# Patient Record
Sex: FEMALE | Race: WHITE | NOT HISPANIC OR LATINO
[De-identification: names, ages, dates, MRNs, and addresses within clinical notes are randomized per-mention and may not be internally consistent; named-entity substitution may affect disease eponyms.]

---

## 2022-06-22 ENCOUNTER — APPOINTMENT (OUTPATIENT)
Dept: ELECTROPHYSIOLOGY | Facility: CLINIC | Age: 72
End: 2022-06-22
Payer: MEDICARE

## 2022-06-22 VITALS
HEART RATE: 62 BPM | BODY MASS INDEX: 37.39 KG/M2 | DIASTOLIC BLOOD PRESSURE: 78 MMHG | HEIGHT: 64 IN | WEIGHT: 219 LBS | SYSTOLIC BLOOD PRESSURE: 162 MMHG

## 2022-06-22 DIAGNOSIS — Z45.010 ENCOUNTER FOR CHECKING AND TESTING OF CARDIAC PACEMAKER PULSE GENERATOR [BATTERY]: ICD-10-CM

## 2022-06-22 PROCEDURE — 93000 ELECTROCARDIOGRAM COMPLETE: CPT

## 2022-06-22 PROCEDURE — 99205 OFFICE O/P NEW HI 60 MIN: CPT

## 2022-06-22 NOTE — HISTORY OF PRESENT ILLNESS
[FreeTextEntry1] : 71 year old woman with history of atrial fibrillation, atrial flutter s/p ablation, bradycardia and syncope s/p ppm implant, HTN, DM, cardiomyopathy with moderate LV dysfunction, hyperparathyroidism, depression, pulmonary hypertension, presenting for pacemaker management.   \par \par She did have syncope many years ago and a Holter monitor revealed a 4.4 second pause as well as significant bradycardia.  She also has a long history of AF and atrial flutter and in 2011 was noted to have atrial flutter, in addition to atrial fibrillation.  On 3/8/2012 she underwent ablation of atrial flutter with Dr Ceballos, as well as a dual chamber ppm implant. \par \par She had previously been on sotalol after ppm implant, but has recently been on rate control with Coreg and digoxin, and anticoagulation with Xarelto. She has had persistent AF at some pint, but recently has been atrial paced predominantly.  \par \par Recently her pacemaker has been noted to be approaching BRANNON; Her dual chamber MDT Adapta ppm was implanted 3/8/12, and has been programmed in MVP-R mode. She has had 99% atrial pacing, and 2% ventricular pacing. Lead function has been stable (RV threshold 0.875V@0.4ms, RA 0.75V@0.4ms). No arrhythmia events are recorded. Estimated battery life to BRANNON is <1 month (range <1-7 months).  \par \par She does reports exertional dyspnea over the last several months, as well as progressive LE edema. She notes she is very limited by depression and anxiety. TTE in 2/2022 revealed LVEF 40-45% with pulm HTN (RVSP 76mmHg).  \par \par She does also report that she had a difficult hospitalization after her first ppm implant due to an allergic reaction to Vancomycin. She reports also an allergy to PCN and erythromycin, but has tolerated Clindamycin in the past. She has had palpitation occasionally as well.  \par \par ECG today initially revealed suspected accelerated junctional rhythm. After device interrogation she was maintained in an atrial paced rhythm, an repeat ECG revealed atrial pacing with narrow QRS and prolonged AV delay.  \par \par Current meds include Xarelto 20, Coreg 12.5mg bid, digoxin 250mcg qd, lisinopril, synthroid  \par \par

## 2022-06-22 NOTE — CARDIOLOGY SUMMARY
[de-identified] : 6/22/22 atrial pacing 62 bpm, low voltage p-wave, prolonged AV delay ( ms), RBBB, LAFB [de-identified] : TTE 2/9/22 LVEF 40-45%, LA 5.3cm, mild con LVH, mod inferior and posterior hypokinesis, mod MR, trace AI, mild PI, mod TR, RVSP 76

## 2022-06-22 NOTE — DISCUSSION/SUMMARY
[FreeTextEntry1] : 71 year old woman with history of atrial fibrillation, atrial flutter s/p ablation, bradycardia and syncope s/p ppm implant, HTN, DM, cardiomyopathy with moderate LV dysfunction, hyperparathyroidism, depression, pulmonary hypertension, presenting for pacemaker management.  Her pacemaker was implanted in the setting of syncope and sinus pause, and she has had tachy-elpidio syndrome. Her PPM is now approaching BRANNON, and she will require pacemaker generator replacement. The risks and benefits of this procedure, including procedure related risks such as bleeding and infection were reviewed. \par \par She does also have significant conduction disease with RBBB and LAFB on ECG and a prolonged AV delay with atrial pacing. She has not had significant ventricular pacing requirement, but has had progressive CHF symptoms, which may potentially be improved with CRT pacing to improve AV and VV synchrony. This could be considered at the time of generator placement- and will offer this as an alternative to generator replacement alone for possible symptomatic benefit. \par \par  She has had allergies to antibiotics in the past, but has tolerated Clindamycin previously. She does also have pulmonary htn, and will plan procedure under sedation. She expressed understanding and will plan elective ppm generator replacement.  \par \par Can reevaluate AF burden and associated symptoms following generator replacement, and reconsider rhythm control options in the future if needed.   \par \par -pacemaker generator replacement, and possible CRT pacemaker upgrade. Hold Xarelto 2 days prior.  \par \par

## 2022-06-22 NOTE — REVIEW OF SYSTEMS
[Feeling Fatigued] : feeling fatigued [SOB] : no shortness of breath [Dyspnea on exertion] : dyspnea during exertion [Chest Discomfort] : no chest discomfort [Lower Ext Edema] : lower extremity edema [Leg Claudication] : no intermittent leg claudication [Palpitations] : palpitations [Syncope] : no syncope [Dizziness] : no dizziness [Convulsions] : no convulsions [Confusion] : no confusion was observed [Depression] : depression [Anxiety] : anxiety [Negative] : Heme/Lymph

## 2022-06-22 NOTE — PHYSICAL EXAM
[Well Developed] : well developed [No Acute Distress] : no acute distress [Obese] : obese [Normal Conjunctiva] : normal conjunctiva [Normal S1, S2] : normal S1, S2 [Clear Lung Fields] : clear lung fields [Good Air Entry] : good air entry [No Respiratory Distress] : no respiratory distress  [Soft] : abdomen soft [Normal Gait] : normal gait [Moves all extremities] : moves all extremities [No Focal Deficits] : no focal deficits [Normal Speech] : normal speech [Alert and Oriented] : alert and oriented [de-identified] : 2/6 DAVIDE [de-identified] : 1+ pitting edema bilaterally. +varicosities

## 2022-09-22 ENCOUNTER — NON-APPOINTMENT (OUTPATIENT)
Age: 72
End: 2022-09-22

## 2022-10-11 ENCOUNTER — OUTPATIENT (OUTPATIENT)
Dept: OUTPATIENT SERVICES | Facility: HOSPITAL | Age: 72
LOS: 1 days | End: 2022-10-11
Payer: MEDICARE

## 2022-10-11 ENCOUNTER — TRANSCRIPTION ENCOUNTER (OUTPATIENT)
Age: 72
End: 2022-10-11

## 2022-10-11 DIAGNOSIS — Z95.0 PRESENCE OF CARDIAC PACEMAKER: Chronic | ICD-10-CM

## 2022-10-12 ENCOUNTER — TRANSCRIPTION ENCOUNTER (OUTPATIENT)
Age: 72
End: 2022-10-12

## 2022-10-12 PROCEDURE — 85027 COMPLETE CBC AUTOMATED: CPT

## 2022-10-12 PROCEDURE — 93005 ELECTROCARDIOGRAM TRACING: CPT

## 2022-10-12 PROCEDURE — 36415 COLL VENOUS BLD VENIPUNCTURE: CPT

## 2022-10-12 PROCEDURE — 33228 REMV&REPLC PM GEN DUAL LEAD: CPT

## 2022-10-12 PROCEDURE — 83036 HEMOGLOBIN GLYCOSYLATED A1C: CPT

## 2022-10-12 PROCEDURE — 80048 BASIC METABOLIC PNL TOTAL CA: CPT

## 2022-10-12 PROCEDURE — 86900 BLOOD TYPING SEROLOGIC ABO: CPT

## 2022-10-12 PROCEDURE — 83735 ASSAY OF MAGNESIUM: CPT

## 2022-10-12 PROCEDURE — 85610 PROTHROMBIN TIME: CPT

## 2022-10-12 PROCEDURE — 86850 RBC ANTIBODY SCREEN: CPT

## 2022-10-12 PROCEDURE — C1889: CPT

## 2022-10-12 PROCEDURE — 86901 BLOOD TYPING SEROLOGIC RH(D): CPT

## 2022-10-12 PROCEDURE — C1785: CPT

## 2022-10-12 PROCEDURE — 82962 GLUCOSE BLOOD TEST: CPT

## 2022-10-12 PROCEDURE — 85730 THROMBOPLASTIN TIME PARTIAL: CPT

## 2022-10-17 DIAGNOSIS — Z45.010 ENCOUNTER FOR CHECKING AND TESTING OF CARDIAC PACEMAKER PULSE GENERATOR [BATTERY]: ICD-10-CM

## 2023-12-19 PROBLEM — R00.1 BRADYCARDIA, UNSPECIFIED: Chronic | Status: ACTIVE | Noted: 2022-10-11

## 2023-12-19 PROBLEM — Z86.79 PERSONAL HISTORY OF OTHER DISEASES OF THE CIRCULATORY SYSTEM: Chronic | Status: ACTIVE | Noted: 2022-10-11

## 2023-12-19 PROBLEM — I48.92 UNSPECIFIED ATRIAL FLUTTER: Chronic | Status: ACTIVE | Noted: 2022-10-11

## 2023-12-19 PROBLEM — Z86.39 PERSONAL HISTORY OF OTHER ENDOCRINE, NUTRITIONAL AND METABOLIC DISEASE: Chronic | Status: ACTIVE | Noted: 2022-10-11

## 2024-01-02 ENCOUNTER — APPOINTMENT (OUTPATIENT)
Dept: FAMILY MEDICINE | Facility: CLINIC | Age: 74
End: 2024-01-02
Payer: MEDICARE

## 2024-01-02 VITALS
HEART RATE: 73 BPM | SYSTOLIC BLOOD PRESSURE: 150 MMHG | BODY MASS INDEX: 36.49 KG/M2 | OXYGEN SATURATION: 96 % | WEIGHT: 219 LBS | DIASTOLIC BLOOD PRESSURE: 80 MMHG | TEMPERATURE: 99 F | HEIGHT: 65 IN

## 2024-01-02 DIAGNOSIS — I27.0 PRIMARY PULMONARY HYPERTENSION: ICD-10-CM

## 2024-01-02 DIAGNOSIS — I45.10 UNSPECIFIED RIGHT BUNDLE-BRANCH BLOCK: ICD-10-CM

## 2024-01-02 DIAGNOSIS — Z00.00 ENCOUNTER FOR GENERAL ADULT MEDICAL EXAMINATION W/OUT ABNORMAL FINDINGS: ICD-10-CM

## 2024-01-02 DIAGNOSIS — I42.9 CARDIOMYOPATHY, UNSPECIFIED: ICD-10-CM

## 2024-01-02 PROCEDURE — G0438: CPT

## 2024-01-02 RX ORDER — ALLOPURINOL 100 MG/1
100 TABLET ORAL DAILY
Qty: 30 | Refills: 1 | Status: DISCONTINUED | COMMUNITY
Start: 2022-06-17 | End: 2024-01-02

## 2024-01-02 RX ORDER — LISINOPRIL 40 MG/1
40 TABLET ORAL DAILY
Qty: 90 | Refills: 2 | Status: COMPLETED | COMMUNITY
Start: 2022-06-17 | End: 2024-01-02

## 2024-01-04 NOTE — HISTORY OF PRESENT ILLNESS
[FreeTextEntry1] : CPE  [de-identified] : 72 y/o F with PMHx of atrial fibrillation, atrial flutter s/p ablation, bradycardia and syncope s/p ppm implant, HTN, DM, cardiomyopathy with moderate LV dysfunction, hyperparathyroidism, depression, pulmonary HTN here to establish care. Reports water retention, fatigue, losing balance, feel bloated, running nose, losing hair, urine incontinence. Reports blood per rectum occasionally, with hx of constipation. never had colonoscopy. Not seen by GI in the past.  Reports b/l LE edema with SOB with exertion. Takes HCTZ at home. Follow cardiologist, last seen 3 months ago. Cardio Dr Yvan lugo. Also reports fall in sept, was on the floor for 24 hrs, went to [facility].  No acute trauma or fractures. Follow Endo Dr ventura Borrero, last seen by her 1 month ago. sasha sensor need to be placed. Blood sugar at home  before meals  [employer] PMM interrogated- renovated -   2022 by Angelito Ceballos, Electrophysiologist  HCM: not vaccinated, declined. Colonoscopy never, pap smears years ago, last Mammo 5 years ago  Partner passed away this year in July, 2023. Follow psych for depression. On Xanax PRN

## 2024-01-04 NOTE — HEALTH RISK ASSESSMENT
[Fair] :  ~his/her~ mood as fair [Never (0 pts)] : Never (0 points) [One fall no injury in past year] : Patient reported one fall in the past year without injury [1] : 2) Feeling down, depressed, or hopeless for several days (1) [PHQ-2 Positive] : PHQ-2 Positive [Patient reported mammogram was normal] : Patient reported mammogram was normal [Patient reported PAP Smear was normal] : Patient reported PAP Smear was normal [None] : None [Alone] : lives alone [Single] : single [Feels Safe at Home] : Feels safe at home [Fully functional (bathing, dressing, toileting, transferring, walking, feeding)] : Fully functional (bathing, dressing, toileting, transferring, walking, feeding) [Fully functional (using the telephone, shopping, preparing meals, housekeeping, doing laundry, using] : Fully functional and needs no help or supervision to perform IADLs (using the telephone, shopping, preparing meals, housekeeping, doing laundry, using transportation, managing medications and managing finances) [Reports changes in dental health] : Reports changes in dental health [Seat Belt] :  uses seat belt [Never] : Never [1 or 2 (0 pts)] : 1 or 2 (0 points) [No] : In the past 12 months have you used drugs other than those required for medical reasons? No [Audit-CScore] : 0 [[id number]] : 2 [Change in mental status noted] : No change in mental status noted [Language] : denies difficulty with language [Behavior] : denies difficulty with behavior [Learning/Retaining New Information] : denies difficulty learning/retaining new information [Sexually Active] : not sexually active [High Risk Behavior] : no high risk behavior [Reports changes in hearing] : Reports no changes in hearing [Reports changes in vision] : Reports no changes in vision [MammogramDate] : 5 years ago  [PapSmearDate] : years ago  [ColonoscopyDate] : never  [Patient/Caregiver not ready to engage] : , patient/caregiver not ready to engage

## 2024-01-04 NOTE — PHYSICAL EXAM
[No Acute Distress] : no acute distress [Well Nourished] : well nourished [Well Developed] : well developed [Well-Appearing] : well-appearing [Normal Sclera/Conjunctiva] : normal sclera/conjunctiva [PERRL] : pupils equal round and reactive to light [EOMI] : extraocular movements intact [Normal Outer Ear/Nose] : the outer ears and nose were normal in appearance [Normal Oropharynx] : the oropharynx was normal [No JVD] : no jugular venous distention [Supple] : supple [No Lymphadenopathy] : no lymphadenopathy [Thyroid Normal, No Nodules] : the thyroid was normal and there were no nodules present [No Respiratory Distress] : no respiratory distress  [No Accessory Muscle Use] : no accessory muscle use [Clear to Auscultation] : lungs were clear to auscultation bilaterally [Normal S1, S2] : normal S1 and S2 [No Carotid Bruits] : no carotid bruits [No Abdominal Bruit] : a ~M bruit was not heard ~T in the abdomen [No Varicosities] : no varicosities [Pedal Pulses Present] : the pedal pulses are present [No Palpable Aorta] : no palpable aorta [No Extremity Clubbing/Cyanosis] : no extremity clubbing/cyanosis [Soft] : abdomen soft [Non Tender] : non-tender [Non-distended] : non-distended [No Masses] : no abdominal mass palpated [No HSM] : no HSM [Normal Bowel Sounds] : normal bowel sounds [Normal Posterior Cervical Nodes] : no posterior cervical lymphadenopathy [Normal Anterior Cervical Nodes] : no anterior cervical lymphadenopathy [No CVA Tenderness] : no CVA  tenderness [No Spinal Tenderness] : no spinal tenderness [No Joint Swelling] : no joint swelling [Grossly Normal Strength/Tone] : grossly normal strength/tone [No Rash] : no rash [Coordination Grossly Intact] : coordination grossly intact [No Focal Deficits] : no focal deficits [Normal Gait] : normal gait [Deep Tendon Reflexes (DTR)] : deep tendon reflexes were 2+ and symmetric [Normal Affect] : the affect was normal [Normal Insight/Judgement] : insight and judgment were intact [de-identified] : 2/6 Systolic murmur ,paced rhythm  [de-identified] : + 3 b/l LE edema  [de-identified] :  1+ pitting edema bilaterally. +varicosities.

## 2024-01-04 NOTE — REVIEW OF SYSTEMS
[Fatigue] : fatigue [Lower Ext Edema] : lower extremity edema [Paroxysmal Nocturnal Dyspnea] : paroxysmal nocturnal dyspnea [Constipation] : constipation [Incontinence] : incontinence [Negative] : Heme/Lymph [de-identified] : LE edema B/L

## 2024-01-04 NOTE — ASSESSMENT
[FreeTextEntry1] : # CPE  - will do blood work today - HCM: declined flu vaccine, No colonoscopy in the past, had pap smear years ago normal, Mammo 5 yrs ago nml  # Blood per rectum - Likely constipation induced hemorrhoids - Counselled pt on OTC MiraLAX and balanced diet   - GI referral given   # A-fib - Rate control carvedilol - AC on Xarelto   # Lower extremity edema  - On HCTZ low dose, will switch to Lasix  - will check renal function  - counselled to make follow up appointment with cardio     - Last EC22 atrial pacing 62 bpm, low voltage p-wave, prolonged AV delay ( ms), RBBB, LAFB    # HFrEF  - Echo: TTE 22 LVEF 40-45%, LA 5.3cm, mild con LVH, mod inferior and posterior hypokinesis, mod MR, trace AI, mild PI, mod TR, RVSP 76 - F.u with cardio repeat TTE and further management of HF   # DM- Type 2 - On Humulin U 500 ( 90U at bedtime and 90U in am)  - Follow endo out-pt   # hypothyroidism  - continue home Synthroid  Depression - working with a therapist - xanax as needed

## 2024-01-08 LAB
ALBUMIN SERPL ELPH-MCNC: 4.1 G/DL
ALP BLD-CCNC: 95 U/L
ALT SERPL-CCNC: 11 U/L
ANION GAP SERPL CALC-SCNC: 12 MMOL/L
AST SERPL-CCNC: 28 U/L
BASOPHILS # BLD AUTO: 0.05 K/UL
BASOPHILS NFR BLD AUTO: 0.8 %
BILIRUB SERPL-MCNC: 0.6 MG/DL
BUN SERPL-MCNC: 16 MG/DL
CALCIUM SERPL-MCNC: 9.7 MG/DL
CHLORIDE SERPL-SCNC: 100 MMOL/L
CHOLEST SERPL-MCNC: 146 MG/DL
CO2 SERPL-SCNC: 29 MMOL/L
CREAT SERPL-MCNC: 0.62 MG/DL
EGFR: 93 ML/MIN/1.73M2
EOSINOPHIL # BLD AUTO: 0.28 K/UL
EOSINOPHIL NFR BLD AUTO: 4.6 %
ESTIMATED AVERAGE GLUCOSE: 214 MG/DL
GLUCOSE SERPL-MCNC: 188 MG/DL
HBA1C MFR BLD HPLC: 9.1 %
HCT VFR BLD CALC: 43.4 %
HDLC SERPL-MCNC: 41 MG/DL
HGB BLD-MCNC: 13.7 G/DL
IMM GRANULOCYTES NFR BLD AUTO: 0.2 %
LDLC SERPL CALC-MCNC: 82 MG/DL
LYMPHOCYTES # BLD AUTO: 1.69 K/UL
LYMPHOCYTES NFR BLD AUTO: 27.5 %
MAN DIFF?: NORMAL
MCHC RBC-ENTMCNC: 28.9 PG
MCHC RBC-ENTMCNC: 31.6 GM/DL
MCV RBC AUTO: 91.6 FL
MONOCYTES # BLD AUTO: 0.44 K/UL
MONOCYTES NFR BLD AUTO: 7.2 %
NEUTROPHILS # BLD AUTO: 3.68 K/UL
NEUTROPHILS NFR BLD AUTO: 59.7 %
NONHDLC SERPL-MCNC: 105 MG/DL
PLATELET # BLD AUTO: 151 K/UL
POTASSIUM SERPL-SCNC: 4.1 MMOL/L
PROT SERPL-MCNC: 7 G/DL
RBC # BLD: 4.74 M/UL
RBC # FLD: 14.6 %
SODIUM SERPL-SCNC: 141 MMOL/L
TRIGL SERPL-MCNC: 132 MG/DL
TSH SERPL-ACNC: 2.46 UIU/ML
WBC # FLD AUTO: 6.15 K/UL

## 2024-01-20 ENCOUNTER — INPATIENT (INPATIENT)
Facility: HOSPITAL | Age: 74
LOS: 4 days | Discharge: HOME | DRG: 291 | End: 2024-01-25
Attending: GENERAL PRACTICE | Admitting: INTERNAL MEDICINE
Payer: MEDICARE

## 2024-01-20 VITALS
HEART RATE: 68 BPM | RESPIRATION RATE: 16 BRPM | SYSTOLIC BLOOD PRESSURE: 182 MMHG | OXYGEN SATURATION: 98 % | DIASTOLIC BLOOD PRESSURE: 92 MMHG | TEMPERATURE: 98 F | WEIGHT: 220.9 LBS

## 2024-01-20 DIAGNOSIS — E11.9 TYPE 2 DIABETES MELLITUS WITHOUT COMPLICATIONS: ICD-10-CM

## 2024-01-20 DIAGNOSIS — Z95.0 PRESENCE OF CARDIAC PACEMAKER: Chronic | ICD-10-CM

## 2024-01-20 DIAGNOSIS — I48.20 CHRONIC ATRIAL FIBRILLATION, UNSPECIFIED: ICD-10-CM

## 2024-01-20 DIAGNOSIS — Z00.00 ENCOUNTER FOR GENERAL ADULT MEDICAL EXAMINATION WITHOUT ABNORMAL FINDINGS: ICD-10-CM

## 2024-01-20 DIAGNOSIS — Z98.890 OTHER SPECIFIED POSTPROCEDURAL STATES: Chronic | ICD-10-CM

## 2024-01-20 DIAGNOSIS — E03.9 HYPOTHYROIDISM, UNSPECIFIED: ICD-10-CM

## 2024-01-20 DIAGNOSIS — R06.02 SHORTNESS OF BREATH: ICD-10-CM

## 2024-01-20 DIAGNOSIS — I50.31 ACUTE DIASTOLIC (CONGESTIVE) HEART FAILURE: ICD-10-CM

## 2024-01-20 DIAGNOSIS — S81.809A UNSPECIFIED OPEN WOUND, UNSPECIFIED LOWER LEG, INITIAL ENCOUNTER: ICD-10-CM

## 2024-01-20 DIAGNOSIS — I10 ESSENTIAL (PRIMARY) HYPERTENSION: ICD-10-CM

## 2024-01-20 DIAGNOSIS — G47.00 INSOMNIA, UNSPECIFIED: ICD-10-CM

## 2024-01-20 LAB
ALBUMIN SERPL ELPH-MCNC: 3.1 G/DL — LOW (ref 3.3–5)
ALP SERPL-CCNC: 116 U/L — SIGNIFICANT CHANGE UP (ref 40–120)
ALT FLD-CCNC: 19 U/L — SIGNIFICANT CHANGE UP (ref 12–78)
ANION GAP SERPL CALC-SCNC: 4 MMOL/L — LOW (ref 5–17)
APPEARANCE UR: CLEAR — SIGNIFICANT CHANGE UP
APTT BLD: 37.5 SEC — HIGH (ref 24.5–35.6)
AST SERPL-CCNC: 27 U/L — SIGNIFICANT CHANGE UP (ref 15–37)
BASOPHILS # BLD AUTO: 0.04 K/UL — SIGNIFICANT CHANGE UP (ref 0–0.2)
BASOPHILS NFR BLD AUTO: 0.7 % — SIGNIFICANT CHANGE UP (ref 0–2)
BILIRUB SERPL-MCNC: 0.9 MG/DL — SIGNIFICANT CHANGE UP (ref 0.2–1.2)
BILIRUB UR-MCNC: NEGATIVE — SIGNIFICANT CHANGE UP
BUN SERPL-MCNC: 16 MG/DL — SIGNIFICANT CHANGE UP (ref 7–23)
CALCIUM SERPL-MCNC: 8.9 MG/DL — SIGNIFICANT CHANGE UP (ref 8.5–10.1)
CHLORIDE SERPL-SCNC: 104 MMOL/L — SIGNIFICANT CHANGE UP (ref 96–108)
CO2 SERPL-SCNC: 32 MMOL/L — HIGH (ref 22–31)
COLOR SPEC: YELLOW — SIGNIFICANT CHANGE UP
CREAT SERPL-MCNC: 0.69 MG/DL — SIGNIFICANT CHANGE UP (ref 0.5–1.3)
DIFF PNL FLD: NEGATIVE — SIGNIFICANT CHANGE UP
EGFR: 90 ML/MIN/1.73M2 — SIGNIFICANT CHANGE UP
EOSINOPHIL # BLD AUTO: 0.28 K/UL — SIGNIFICANT CHANGE UP (ref 0–0.5)
EOSINOPHIL NFR BLD AUTO: 5 % — SIGNIFICANT CHANGE UP (ref 0–6)
ERYTHROCYTE [SEDIMENTATION RATE] IN BLOOD: 7 MM/HR — SIGNIFICANT CHANGE UP (ref 0–20)
GLUCOSE SERPL-MCNC: 141 MG/DL — HIGH (ref 70–99)
GLUCOSE UR QL: NEGATIVE MG/DL — SIGNIFICANT CHANGE UP
HCT VFR BLD CALC: 41.7 % — SIGNIFICANT CHANGE UP (ref 34.5–45)
HGB BLD-MCNC: 13.1 G/DL — SIGNIFICANT CHANGE UP (ref 11.5–15.5)
IMM GRANULOCYTES NFR BLD AUTO: 0.4 % — SIGNIFICANT CHANGE UP (ref 0–0.9)
INR BLD: 1.78 RATIO — HIGH (ref 0.85–1.18)
KETONES UR-MCNC: NEGATIVE MG/DL — SIGNIFICANT CHANGE UP
LACTATE SERPL-SCNC: 1.1 MMOL/L — SIGNIFICANT CHANGE UP (ref 0.7–2)
LEUKOCYTE ESTERASE UR-ACNC: ABNORMAL
LYMPHOCYTES # BLD AUTO: 1.43 K/UL — SIGNIFICANT CHANGE UP (ref 1–3.3)
LYMPHOCYTES # BLD AUTO: 25.8 % — SIGNIFICANT CHANGE UP (ref 13–44)
MCHC RBC-ENTMCNC: 29 PG — SIGNIFICANT CHANGE UP (ref 27–34)
MCHC RBC-ENTMCNC: 31.4 GM/DL — LOW (ref 32–36)
MCV RBC AUTO: 92.5 FL — SIGNIFICANT CHANGE UP (ref 80–100)
MONOCYTES # BLD AUTO: 0.48 K/UL — SIGNIFICANT CHANGE UP (ref 0–0.9)
MONOCYTES NFR BLD AUTO: 8.6 % — SIGNIFICANT CHANGE UP (ref 2–14)
NEUTROPHILS # BLD AUTO: 3.3 K/UL — SIGNIFICANT CHANGE UP (ref 1.8–7.4)
NEUTROPHILS NFR BLD AUTO: 59.5 % — SIGNIFICANT CHANGE UP (ref 43–77)
NITRITE UR-MCNC: NEGATIVE — SIGNIFICANT CHANGE UP
NRBC # BLD: 0 /100 WBCS — SIGNIFICANT CHANGE UP (ref 0–0)
NT-PROBNP SERPL-SCNC: 1029 PG/ML — HIGH (ref 0–450)
PH UR: 5.5 — SIGNIFICANT CHANGE UP (ref 5–8)
PLATELET # BLD AUTO: 157 K/UL — SIGNIFICANT CHANGE UP (ref 150–400)
POTASSIUM SERPL-MCNC: 4 MMOL/L — SIGNIFICANT CHANGE UP (ref 3.5–5.3)
POTASSIUM SERPL-SCNC: 4 MMOL/L — SIGNIFICANT CHANGE UP (ref 3.5–5.3)
PROT SERPL-MCNC: 7.1 G/DL — SIGNIFICANT CHANGE UP (ref 6–8.3)
PROT UR-MCNC: 300 MG/DL
PROTHROM AB SERPL-ACNC: 20.5 SEC — HIGH (ref 9.5–13)
RAPID RVP RESULT: SIGNIFICANT CHANGE UP
RBC # BLD: 4.51 M/UL — SIGNIFICANT CHANGE UP (ref 3.8–5.2)
RBC # FLD: 14.9 % — HIGH (ref 10.3–14.5)
SARS-COV-2 RNA SPEC QL NAA+PROBE: SIGNIFICANT CHANGE UP
SODIUM SERPL-SCNC: 140 MMOL/L — SIGNIFICANT CHANGE UP (ref 135–145)
SP GR SPEC: 1.02 — SIGNIFICANT CHANGE UP (ref 1–1.03)
TROPONIN I, HIGH SENSITIVITY RESULT: 50.7 NG/L — SIGNIFICANT CHANGE UP
UROBILINOGEN FLD QL: 1 MG/DL — SIGNIFICANT CHANGE UP (ref 0.2–1)
WBC # BLD: 5.55 K/UL — SIGNIFICANT CHANGE UP (ref 3.8–10.5)
WBC # FLD AUTO: 5.55 K/UL — SIGNIFICANT CHANGE UP (ref 3.8–10.5)

## 2024-01-20 PROCEDURE — 71045 X-RAY EXAM CHEST 1 VIEW: CPT | Mod: 26

## 2024-01-20 PROCEDURE — 99285 EMERGENCY DEPT VISIT HI MDM: CPT | Mod: FS

## 2024-01-20 PROCEDURE — 93970 EXTREMITY STUDY: CPT | Mod: 26

## 2024-01-20 PROCEDURE — 99223 1ST HOSP IP/OBS HIGH 75: CPT

## 2024-01-20 PROCEDURE — 99222 1ST HOSP IP/OBS MODERATE 55: CPT | Mod: GC

## 2024-01-20 RX ORDER — SODIUM CHLORIDE 9 MG/ML
1000 INJECTION, SOLUTION INTRAVENOUS
Refills: 0 | Status: DISCONTINUED | OUTPATIENT
Start: 2024-01-20 | End: 2024-01-25

## 2024-01-20 RX ORDER — INSULIN LISPRO 100/ML
30 VIAL (ML) SUBCUTANEOUS
Refills: 0 | Status: DISCONTINUED | OUTPATIENT
Start: 2024-01-20 | End: 2024-01-20

## 2024-01-20 RX ORDER — LEVOTHYROXINE SODIUM 125 MCG
125 TABLET ORAL DAILY
Refills: 0 | Status: DISCONTINUED | OUTPATIENT
Start: 2024-01-20 | End: 2024-01-25

## 2024-01-20 RX ORDER — ACETAMINOPHEN 500 MG
650 TABLET ORAL EVERY 6 HOURS
Refills: 0 | Status: DISCONTINUED | OUTPATIENT
Start: 2024-01-20 | End: 2024-01-25

## 2024-01-20 RX ORDER — DEXTROSE 50 % IN WATER 50 %
12.5 SYRINGE (ML) INTRAVENOUS ONCE
Refills: 0 | Status: DISCONTINUED | OUTPATIENT
Start: 2024-01-20 | End: 2024-01-25

## 2024-01-20 RX ORDER — INSULIN LISPRO 100/ML
VIAL (ML) SUBCUTANEOUS AT BEDTIME
Refills: 0 | Status: DISCONTINUED | OUTPATIENT
Start: 2024-01-20 | End: 2024-01-22

## 2024-01-20 RX ORDER — INSULIN LISPRO 100/ML
VIAL (ML) SUBCUTANEOUS
Refills: 0 | Status: DISCONTINUED | OUTPATIENT
Start: 2024-01-20 | End: 2024-01-22

## 2024-01-20 RX ORDER — DEXTROSE 50 % IN WATER 50 %
25 SYRINGE (ML) INTRAVENOUS ONCE
Refills: 0 | Status: DISCONTINUED | OUTPATIENT
Start: 2024-01-20 | End: 2024-01-25

## 2024-01-20 RX ORDER — FUROSEMIDE 40 MG
1 TABLET ORAL
Refills: 0 | DISCHARGE

## 2024-01-20 RX ORDER — INSULIN LISPRO 100/ML
80 VIAL (ML) SUBCUTANEOUS
Refills: 0 | DISCHARGE

## 2024-01-20 RX ORDER — INSULIN HUMAN 100 [IU]/ML
30 INJECTION, SOLUTION SUBCUTANEOUS
Refills: 0 | Status: DISCONTINUED | OUTPATIENT
Start: 2024-01-20 | End: 2024-01-20

## 2024-01-20 RX ORDER — DEXTROSE 50 % IN WATER 50 %
15 SYRINGE (ML) INTRAVENOUS ONCE
Refills: 0 | Status: DISCONTINUED | OUTPATIENT
Start: 2024-01-20 | End: 2024-01-25

## 2024-01-20 RX ORDER — GLUCAGON INJECTION, SOLUTION 0.5 MG/.1ML
1 INJECTION, SOLUTION SUBCUTANEOUS ONCE
Refills: 0 | Status: DISCONTINUED | OUTPATIENT
Start: 2024-01-20 | End: 2024-01-25

## 2024-01-20 RX ORDER — INSULIN LISPRO 100/ML
50 VIAL (ML) SUBCUTANEOUS
Refills: 0 | Status: DISCONTINUED | OUTPATIENT
Start: 2024-01-20 | End: 2024-01-20

## 2024-01-20 RX ORDER — FUROSEMIDE 40 MG
40 TABLET ORAL ONCE
Refills: 0 | Status: COMPLETED | OUTPATIENT
Start: 2024-01-20 | End: 2024-01-20

## 2024-01-20 RX ORDER — CARVEDILOL PHOSPHATE 80 MG/1
12.5 CAPSULE, EXTENDED RELEASE ORAL EVERY 12 HOURS
Refills: 0 | Status: DISCONTINUED | OUTPATIENT
Start: 2024-01-20 | End: 2024-01-25

## 2024-01-20 RX ORDER — ZOLPIDEM TARTRATE 10 MG/1
5 TABLET ORAL AT BEDTIME
Refills: 0 | Status: DISCONTINUED | OUTPATIENT
Start: 2024-01-20 | End: 2024-01-25

## 2024-01-20 RX ORDER — INSULIN HUMAN 100 [IU]/ML
50 INJECTION, SOLUTION SUBCUTANEOUS
Refills: 0 | Status: DISCONTINUED | OUTPATIENT
Start: 2024-01-20 | End: 2024-01-22

## 2024-01-20 RX ORDER — FUROSEMIDE 40 MG
40 TABLET ORAL
Refills: 0 | Status: DISCONTINUED | OUTPATIENT
Start: 2024-01-21 | End: 2024-01-23

## 2024-01-20 RX ORDER — RIVAROXABAN 15 MG-20MG
20 KIT ORAL
Refills: 0 | Status: DISCONTINUED | OUTPATIENT
Start: 2024-01-20 | End: 2024-01-25

## 2024-01-20 RX ORDER — DIGOXIN 250 MCG
250 TABLET ORAL DAILY
Refills: 0 | Status: DISCONTINUED | OUTPATIENT
Start: 2024-01-20 | End: 2024-01-25

## 2024-01-20 RX ADMIN — Medication 40 MILLIGRAM(S): at 19:22

## 2024-01-20 RX ADMIN — Medication 100 MILLIGRAM(S): at 21:17

## 2024-01-20 RX ADMIN — CARVEDILOL PHOSPHATE 12.5 MILLIGRAM(S): 80 CAPSULE, EXTENDED RELEASE ORAL at 19:21

## 2024-01-20 RX ADMIN — Medication 650 MILLIGRAM(S): at 23:00

## 2024-01-20 RX ADMIN — Medication 650 MILLIGRAM(S): at 21:08

## 2024-01-20 RX ADMIN — Medication 100 MILLIGRAM(S): at 13:32

## 2024-01-20 NOTE — CONSULT NOTE ADULT - SUBJECTIVE AND OBJECTIVE BOX
HPI:  74YO F with PMHx of atrial fibrillation on xarelto, atrial flutter s/p ablation, bradycardia and syncope s/p ppm implant, HTN, DM, cardiomyopathy with moderate LV dysfunction, hypothyroid, hyperparathyroidism, depression, pulmonary HTN presents to ED with progressively worsening SOB and worsening marcello LE edema with weeping b/l LE wounds.  Denies fever chills n/v/d CP. There is a concern for poss leg cellulitis    Infectious Disease consult was called to evaluate pt and for antibiotic management.      Past Medical & Surgical Hx:  PAST MEDICAL & SURGICAL HISTORY:  Hypothyroid  DM (diabetes mellitus)  HTN (hypertension)  Atrial fibrillation and flutter  s/p ablation  Bradycardia  s/p MDT PPM  H/O pulmonary hypertension  H/O hyperparathyroidism  H/O cardiomyopathy  Cardiac pacemaker  H/O cardiac radiofrequency ablation  H/O carpal tunnel repair  History of parathyroid surgery      Social History--  EtOH: denies   Tobacco: denies   Drug Use: denies     FAMILY HISTORY:  Noncontributory    Allergies  penicillin (Short breath; Rash)  statins (Vomiting)  vancomycin (Short breath; Rash)  erythromycin (Rash)    Intolerances  NONE    Home Medications:  carvedilol 12.5 mg oral tablet: 1 tab(s) orally 2 times a day (20 Jan 2024 17:25)  digoxin 250 mcg (0.25 mg) oral tablet: 1 tab(s) orally once a day (20 Jan 2024 17:25)  HumaLOG 100 units/mL injectable solution: 80 unit(s) injectable in the morning and at bedtime (20 Jan 2024 17:25)  Lasix 20 mg oral tablet: 1 tab(s) orally once a day (20 Jan 2024 17:25)  Synthroid 125 mcg (0.125 mg) oral tablet: 1 tab(s) orally once a day (20 Jan 2024 17:25)  Tylenol 325 mg oral tablet: 2 tab(s) orally every 4 hours (20 Jan 2024 17:25)  Xarelto 20 mg oral tablet: 1 tab(s) orally once a day (in the evening)  hold today and tomorrow and resume on 10/14 evening  (20 Jan 2024 17:25)  zolpidem 12.5 mg oral tablet, extended release: 1 tab(s) orally once a day (at bedtime) (20 Jan 2024 17:25)      Current Inpatient Medications :    ANTIBIOTICS:       OTHER RELEVANT MEDICATIONS :  acetaminophen     Tablet .. 650 milliGRAM(s) Oral every 6 hours PRN  carvedilol 12.5 milliGRAM(s) Oral every 12 hours  dextrose 5%. 1000 milliLiter(s) IV Continuous <Continuous>  dextrose 5%. 1000 milliLiter(s) IV Continuous <Continuous>  dextrose 5%. 1000 milliLiter(s) IV Continuous <Continuous>  dextrose 5%. 1000 milliLiter(s) IV Continuous <Continuous>  dextrose 5%. 1000 milliLiter(s) IV Continuous <Continuous>  dextrose 50% Injectable 12.5 Gram(s) IV Push once  dextrose 50% Injectable 25 Gram(s) IV Push once  dextrose 50% Injectable 12.5 Gram(s) IV Push once  dextrose 50% Injectable 25 Gram(s) IV Push once  dextrose 50% Injectable 25 Gram(s) IV Push once  dextrose Oral Gel 15 Gram(s) Oral once PRN  digoxin     Tablet 250 MICROGram(s) Oral daily  furosemide   Injectable 40 milliGRAM(s) IV Push two times a day  glucagon  Injectable 1 milliGRAM(s) IntraMuscular once  glucagon  Injectable 1 milliGRAM(s) IntraMuscular once  insulin lispro (ADMELOG) corrective regimen sliding scale   SubCutaneous three times a day before meals  insulin lispro (ADMELOG) corrective regimen sliding scale   SubCutaneous at bedtime  insulin regular (concentrated) human recombinant U-500 50 Unit(s) SubCutaneous two times a day with meals  levothyroxine 125 MICROGram(s) Oral daily  rivaroxaban 20 milliGRAM(s) Oral with dinner  zolpidem 5 milliGRAM(s) Oral at bedtime PRN  zolpidem 5 milliGRAM(s) Oral at bedtime PRN      ROS:  CONSTITUTIONAL:  Negative fever or chill  EYES:  Negative  blurry vision or double vision  CARDIOVASCULAR:  Negative for chest pain or palpitations  RESPIRATORY:  Negative for cough, wheezing, or SOB   GASTROINTESTINAL:  Negative for nausea, vomiting, diarrhea, constipation, or abdominal pain  GENITOURINARY:  Negative frequency, urgency , dysuria or hematuria   NEUROLOGIC:  No headache, confusion, dizziness, lightheadedness  All other systems were reviewed and are negative        Physical Exam:  Vital Signs Last 24 Hrs  T(C): 36.7 (20 Jan 2024 20:03), Max: 36.8 (20 Jan 2024 12:24)  T(F): 98.1 (20 Jan 2024 20:03), Max: 98.3 (20 Jan 2024 12:24)  HR: 68 (20 Jan 2024 20:03) (68 - 68)  BP: 165/88 (20 Jan 2024 20:03) (165/88 - 182/92)  RR: 18 (20 Jan 2024 20:03) (16 - 18)  SpO2: 97% (20 Jan 2024 20:03) (97% - 98%)    Parameters below as of 20 Jan 2024 20:03  Patient On (Oxygen Delivery Method): room air        Weight (kg): 100.2 (01-20 @ 12:24)    General: well developed well nourished, in no acute distress  Neck: supple, trachea midline  Lungs: clear, no wheeze/rhonchi  Cardiovascular: regular rate and rhythm, S1 S2  Abdomen: soft, nontender, ND, bowel sounds normal  Neurological:  alert and oriented x3  Skin: no rash  Extremities: Marcello LE edema with weeping serous fluid + superficial leg ulcers  + chronic stasis minimal erythema Left > Right    Labs:                         13.1   5.55  )-----------( 157      ( 20 Jan 2024 13:30 )             41.7   01-20    140  |  104  |  16  ----------------------------<  141<H>  4.0   |  32<H>  |  0.69    Ca    8.9      20 Jan 2024 13:30    TPro  7.1  /  Alb  3.1<L>  /  TBili  0.9  /  DBili  x   /  AST  27  /  ALT  19  /  AlkPhos  116  01-20      RECENT CULTURES:          RADIOLOGY & ADDITIONAL STUDIES:    ACC: [id number] EXAM:  US DPLX LWR EXT VEINS COMPL BI   ORDERED BY:   RAZA JESSICA     PROCEDURE DATE:  01/20/2024          INTERPRETATION:  CLINICAL INFORMATION: Bilateral leg swelling.    COMPARISON: None available.    TECHNIQUE: Duplex sonography of the BILATERAL LOWER extremity veins with   color and spectral Doppler, with and without compression.    FINDINGS:    RIGHT:  Normal compressibility of the RIGHT common femoral, femoral and popliteal   veins.  Doppler examination shows normal spontaneous and phasic flow.  Limited visualized of the RIGHT calf veins. No RIGHT calf vein thrombosis   detected.    LEFT:  Normal compressibility of the LEFT common femoral, femoral and popliteal   veins.  Doppler examination shows normal spontaneous and phasic flow.  Limited visualized of the LEFT calf veins. No LEFT calf vein thrombosis   detected.    IMPRESSION:  No evidence of deep venous thrombosis in either lower extremity.      ACC: [id number] EXAM:  XR CHEST PORTABLE IMMED 1V   ORDERED BY: RAZA JESSICA     PROCEDURE DATE:  01/20/2024          INTERPRETATION:  AP chest on January 20, 2024 at 1:17 PM. Patient has   sepsis.    Heart enlargement and left-sided pacemaker again noted.    There are diffuse mild congestive changes somewhat increased from March 8, 2012.    IMPRESSION: Mild CHF at this time. Stable cardiac findings.      Assessment :   74YO F with PMHx of atrial fibrillation on xarelto, atrial flutter s/p ablation, bradycardia and syncope s/p ppm implant, HTN, DM, cardiomyopathy with moderate LV dysfunction, hypothyroid, hyperparathyroidism, depression, pulmonary HTN presents to ED with progressively worsening SOB and worsening marcello LE edema with weeping b/l LE wounds.  Fluid overload with CHD exacerbation marcello LE edema/anasarca  CXR with CHF  No concern for leg cellulitis      Plan :   Monitor off antibiotics  Trend temps and cbc  Fu cultures  Diurese per cardio  Elevate leg  Local wound care  Pulm toileting  Stable from ID standpoint    Continue with present regiment .  Approptiate use of antibiotics and adverse effects reviewed.      I have discussed the above plan of care with patient/family in detail. They expressed understanding of the treatment plan . Risks, benefits and alternatives discussed in detail. I have asked if they have any questions or concerns and appropriately addressed them to the best of my ability .      > 45 minutes spent in direct patient care reviewing  the notes, lab data/ imaging , discussion with multidisciplinary team. All questions were addressed and answered to the best of my capacity .    Thank you for allowing me to participate in the care of your patient .      Gina Driscoll MD  Infectious Disease  [phone]

## 2024-01-20 NOTE — CONSULT NOTE ADULT - ASSESSMENT
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 Cardiology called for SOB. Dr. Ellis: Cardio.     A fib/Flutter, HTN, Cardiomyopathy, mod LVSD, Pulm HTN, PPM  - Pt p/w shortness of breath, chest pressure and LE edema  - Known CHF w/ mod LVSD and pulm HTN   - Echo: TTE 2/9/22 LVEF 40-45%, LA 5.3cm, mild con LVH, mod inferior and posterior hypokinesis, mod MR, trace AI, mild PI, mod TR, RVSP 76   - BNP 1029  - CXR with diffuse mild congestive changes  - on Lasix 20 mg PO daily at home.   - Would diurese w/   - Monitor strict i/o and daily weight     - known A fib  - rate controlled at present   - Continue Coreg and digoxin  - Continue Xarelto    - EKG showed A paced rhythm/ Aflib @ 61  - Troponin HsI 50.7  - No evidence of any active ischemia     - Monitor and replete lytes, keep K>4, Mg>2.  - Will continue to follow.    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 Cardiology called for SOB. Dr. Ellis: Cardio.     A fib/Flutter, HTN, Cardiomyopathy, mod LVSD, Pulm HTN, PPM  - Pt p/w Denies any difficulty breathing.hortness of breath, chest pressure and LE edema  up to pelvis and pt also noticed abd edema.  - She saw her cardiologist and was started on lasix 20 mg PO daily last week.   - Known CHF w/ mod LVSD and pulm HTN   - Echo: TTE 2/9/22 LVEF 40-45%, LA 5.3cm, mild con LVH, mod inferior and posterior hypokinesis, mod MR, trace AI, mild PI, mod TR, RVSP 76   - BNP 1029  - CXR with diffuse mild congestive changes  - Would diurese w/ Lasix 40 mg IV x 1 and start Lasix 40 mg IV BID  - Monitor on tele   - Monitor strict i/o and daily weight     - known A fib  - rate controlled at present   - Continue Coreg and digoxin  - Continue Xarelto    - EKG showed A paced rhythm/ Aflib @ 61  - Troponin HsI 50.7  - No evidence of any active ischemia     - Monitor and replete lytes, keep K>4, Mg>2.  - Will continue to follow.    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 Cardiology called for SOB. Dr. Ellis: Cardio.     A fib/Flutter, HTN, Cardiomyopathy, mod LVSD, Pulm HTN, PPM  - Pt p/w Denies any difficulty breathing.hortness of breath, chest pressure and LE edema  up to pelvis and pt also noticed abd edema.  - She saw her cardiologist and was started on lasix 20 mg PO daily last week.   - Known CHF w/ mod LVSD and pulm HTN   - Echo: TTE 2/9/22 LVEF 40-45%, LA 5.3cm, mild con LVH, mod inferior and posterior hypokinesis, mod MR, trace AI, mild PI, mod TR, RVSP 76   - Please obtain transthoracic echocardiogram to assess ventricular function, wall motion and valvular abnormalities.   - BNP 1029  - CXR with diffuse mild congestive changes  - Would diurese w/ Lasix 40 mg IV x 1 and start Lasix 40 mg IV BID  - Monitor on tele   - Monitor strict i/o and daily weight     - known A fib  - rate controlled at present   - Continue Coreg and digoxin  - Continue Xarelto    - EKG showed A paced rhythm/ Aflib @ 61  - Troponin HsI 50.7  - No evidence of any active ischemia     - Monitor and replete lytes, keep K>4, Mg>2.  - Will continue to follow.    Flaquita Hansen, MS FNP, AGACNP  Nurse Practitioner- Cardiology   Please call on TEAMS

## 2024-01-20 NOTE — ED PROVIDER NOTE - OBJECTIVE STATEMENT
Patient is a 73-year-old female with past medical history of A-fib, edema, hypothyroidism, diabetes mellitus, hypertension, pulmonary hypertension, hyperparathyroidism, cardiomyopathy presents with shortness of breath.  Patient reports increasing shortness of breath over the past few weeks with chest pressure.  Patient also notes increased lower extremity swelling with edema.  Patient denies fever, nausea, vomiting, diarrhea

## 2024-01-20 NOTE — CONSULT NOTE ADULT - SUBJECTIVE AND OBJECTIVE BOX
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  Patient reports increasing shortness of breath over the past few weeks with chest pressure.  Patient also notes increased lower extremity swelling with edema.  Patient denies fever, nausea, vomiting,     In ED, T(F): 98.3, HR: 68, BP: 182/92, RR: 16, SpO2: 98%. Labs remarkable for  Troponin HsI 50.7 ,BNP 1029. EKG showed A paced rhythm/ Aflib @ 61.  Cardiology called for SOB. Dr. Ellis: Cardio.     Allergies  vancomycin (Unknown)  statins (Vomiting)  penicillin (Unknown)  erythromycin (Rash)    Intolerances      PAST MEDICAL & SURGICAL HISTORY:  Hypothyroid      DM (diabetes mellitus)      HTN (hypertension)      Atrial fibrillation and flutter  s/p ablation      Bradycardia  s/p MDT PPM      H/O pulmonary hypertension      H/O hyperparathyroidism      H/O cardiomyopathy      Cardiac pacemaker      H/O cardiac radiofrequency ablation      H/O carpal tunnel repair      History of parathyroid surgery        MEDICATIONS  (STANDING):  clindamycin IVPB 900 milliGRAM(s) IV Intermittent every 8 hours  clindamycin IVPB        MEDICATIONS  (PRN):    FAMILY HISTORY:     No family history of acute MI or sudden cardiac death.    SOCIAL HISTORY: No active tobacco, ethanol, or drug abuse.    REVIEW OF SYSTEMS   All other review of systems is negative unless indicated above.    VITAL SIGNS:   Vital Signs Last 24 Hrs  T(C): 36.8 (20 Jan 2024 12:24), Max: 36.8 (20 Jan 2024 12:24)  T(F): 98.3 (20 Jan 2024 12:24), Max: 98.3 (20 Jan 2024 12:24)  HR: 68 (20 Jan 2024 12:24) (68 - 68)  BP: 182/92 (20 Jan 2024 12:24) (182/92 - 182/92)  BP(mean): --  RR: 16 (20 Jan 2024 12:24) (16 - 16)  SpO2: 98% (20 Jan 2024 12:24) (98% - 98%)    Parameters below as of 20 Jan 2024 12:24  Patient On (Oxygen Delivery Method): room air        Physical Exam:  Constitutional: NAD, awake and alert  HEENT: Moist Mucous Membranes, Anicteric  Pulmonary: Non-labored, breath sounds are clear bilaterally, No wheezing, rales or rhonchi  Cardiovascular: Regular, S1 and S2, No murmurs, No rubs, gallops or clicks  Gastrointestinal: Bowel Sounds present, soft, nontender.   Lymph: No peripheral edema. No lymphadenopathy.  Skin: No visible rashes or ulcers.  Psych:  Mood & affect appropriate    I&O's Summary      LABS: All Labs Reviewed:                        13.1   5.55  )-----------( 157      ( 20 Jan 2024 13:30 )             41.7     20 Jan 2024 13:30    140    |  104    |  16     ----------------------------<  141    4.0     |  32     |  0.69     Ca    8.9        20 Jan 2024 13:30    TPro  7.1    /  Alb  3.1    /  TBili  0.9    /  DBili  x      /  AST  27     /  ALT  19     /  AlkPhos  116    20 Jan 2024 13:30    PT/INR - ( 20 Jan 2024 13:30 )   PT: 20.5 sec;   INR: 1.78 ratio         PTT - ( 20 Jan 2024 13:30 )  PTT:37.5 secTroponin I, High Sensitivity Result: 50.7 ng/L (01-20-24 @ 13:30)   What Type Of Note Output Would You Prefer (Optional)?: Standard Output How Severe Are Your Spot(S)?: moderate Have Your Spot(S) Been Treated In The Past?: has not been treated Hpi Title: Evaluation of Skin Lesions [facility] - Josue Moore, Lisa, Kenneth, Noelle, Kimberli, Ang; Office Number: [phone]    Initial Consult Note  CHIEF COMPLAINT: Patient is a 73y old  Female who presents with a chief complaint of SOB    HPI: 73-year-old female with past medical history of atrial fibrillation and atrial flutter s/p ablation, HTN, DM, cardiomyopathy with moderate LV dysfunction, pulmonary hypertension, depression, bradycardia and conduction disease s/p dual chamber pacemaker implant, edema, hypothyroidism, hyperparathyroidism, presents with shortness of breath.  Patient reports increasing shortness of breath over the past few weeks with chest pressure. SOB improves with rest. Patient also notes increased lower extremity swelling with edema.  She saw her cardiologist and was started on lasix 20 mg PO daily last week. Pt did not notice much improvement and leg edema was up to pelvis and pt also noticed abd edema. Patient denies fever, nausea, vomiting,     In ED, T(F): 98.3, HR: 68, BP: 182/92, RR: 16, SpO2: 98%. Labs remarkable for  Troponin HsI 50.7 ,BNP 1029. EKG showed A paced rhythm/ Aflib @ 61.  Cardiology called for SOB. Dr. Ellis: Cardio.     Allergies  vancomycin (Unknown)  statins (Vomiting)  penicillin (Unknown)  erythromycin (Rash)    Intolerances      PAST MEDICAL & SURGICAL HISTORY:  Hypothyroid      DM (diabetes mellitus)      HTN (hypertension)      Atrial fibrillation and flutter  s/p ablation      Bradycardia  s/p MDT PPM      H/O pulmonary hypertension      H/O hyperparathyroidism      H/O cardiomyopathy      Cardiac pacemaker      H/O cardiac radiofrequency ablation      H/O carpal tunnel repair      History of parathyroid surgery        MEDICATIONS  (STANDING):  clindamycin IVPB 900 milliGRAM(s) IV Intermittent every 8 hours  clindamycin IVPB        MEDICATIONS  (PRN):    FAMILY HISTORY:     No family history of acute MI or sudden cardiac death.    SOCIAL HISTORY: No active tobacco, ethanol, or drug abuse.    REVIEW OF SYSTEMS   All other review of systems is negative unless indicated above.    VITAL SIGNS:   Vital Signs Last 24 Hrs  T(C): 36.8 (20 Jan 2024 12:24), Max: 36.8 (20 Jan 2024 12:24)  T(F): 98.3 (20 Jan 2024 12:24), Max: 98.3 (20 Jan 2024 12:24)  HR: 68 (20 Jan 2024 12:24) (68 - 68)  BP: 182/92 (20 Jan 2024 12:24) (182/92 - 182/92)  BP(mean): --  RR: 16 (20 Jan 2024 12:24) (16 - 16)  SpO2: 98% (20 Jan 2024 12:24) (98% - 98%)    Parameters below as of 20 Jan 2024 12:24  Patient On (Oxygen Delivery Method): room air        Physical Exam:  Constitutional: NAD, awake and alert, Obese   HEENT: Moist Mucous Membranes, Anicteric  Pulmonary: Non-labored, breath sounds are clear bilaterally, Diminished at bases  No wheezing, rales or rhonchi  Cardiovascular: Regular, S1 and S2, No murmurs, No rubs, gallops or clicks  Gastrointestinal: Bowel Sounds present, soft, nontender.   Lymph: +2-3 LE edema extending to pelvis and abdomen No lymphadenopathy.  Skin: No visible rashes or ulcers.  Psych:  Mood & affect appropriate    I&O's Summary      LABS: All Labs Reviewed:                        13.1   5.55  )-----------( 157      ( 20 Jan 2024 13:30 )             41.7     20 Jan 2024 13:30    140    |  104    |  16     ----------------------------<  141    4.0     |  32     |  0.69     Ca    8.9        20 Jan 2024 13:30    TPro  7.1    /  Alb  3.1    /  TBili  0.9    /  DBili  x      /  AST  27     /  ALT  19     /  AlkPhos  116    20 Jan 2024 13:30    PT/INR - ( 20 Jan 2024 13:30 )   PT: 20.5 sec;   INR: 1.78 ratio         PTT - ( 20 Jan 2024 13:30 )  PTT:37.5 secTroponin I, High Sensitivity Result: 50.7 ng/L (01-20-24 @ 13:30)

## 2024-01-20 NOTE — H&P ADULT - NSHPREVIEWOFSYSTEMS_GEN_ALL_CORE
REVIEW OF SYSTEMS:    CONSTITUTIONAL: No weakness, fevers or chills  EYES/ENT: No visual changes;  No vertigo or throat pain   NECK: No pain or stiffness  RESPIRATORY: +SOB, No cough, wheezing, hemoptysis;   CARDIOVASCULAR: No chest pain or palpitations  GASTROINTESTINAL: No abdominal or epigastric pain. No nausea, vomiting, or hematemesis; No diarrhea or constipation. No melena or hematochezia.  GENITOURINARY: No dysuria, frequency or hematuria  NEUROLOGICAL: No numbness or weakness  SKIN: +rash under skin folds, +weeping leg wounds

## 2024-01-20 NOTE — ED ADULT NURSE NOTE - CCCP TRG CHIEF CMPLNT
[facility]   Section of Cardiology  Progress Note      Date:  12/16/2020  Patient: Renée Alaniz  Admission:  12/14/2020 11:03 AM  Admit DX: Pneumonia [J18.9]  Age:  60 y.o., 1964                           LOS: 2 days     Reason for evaluation:   Tachycardia  Metastatic lung cancer      SUBJECTIVE:     The patient was seen and examined. Notes and labs reviewed. There were not complications over night. Patient has been made DNR by family. Tachycardia has improved. Patient's cardiac review of systems: negative. The patient is generally feeling unchanged. OBJECTIVE:    BP 82/60   Pulse 101   Temp 97.3 °F (36.3 °C) (Temporal)   Resp (!) 41   Ht 5' 1\" (1.549 m)   Wt 81 lb (36.7 kg)   SpO2 100%   BMI 15.30 kg/m²     Intake/Output Summary (Last 24 hours) at 12/16/2020 1646  Last data filed at 12/16/2020 0600  Gross per 24 hour   Intake 3694 ml   Output --   Net 3694 ml       EXAM:   CONSTITUTIONAL:  Drowsy   HEENT: Normal jugular venous pulsations, no carotid bruits. Head is atraumatic, normocephalic. Eyes and oral mucosa are normal.  LUNGS: Good respiratory effort. No for increased work of breathing. On auscultation: clear to auscultation bilaterally  CARDIOVASCULAR:  Normal apical impulse, regular rate and rhythm, normal S1 and S2, no S3 or S4, and no murmur or rub noted. ABDOMEN: Soft, nontender, nondistended. Bowel sounds present. No masses or tenderness. SKIN: Warm and dry. EXTREMITIES: No lower extremity edema. Motor movement grossly intact. No cyanosis or clubbing.     Current Inpatient Medications:   methylPREDNISolone  40 mg Intravenous Q6H    piperacillin-tazobactam  3.375 g Intravenous Q8H    insulin lispro  0-12 Units Subcutaneous TID WC    insulin lispro  0-6 Units Subcutaneous Nightly    heparin (porcine)  5,000 Units Subcutaneous BID    folic acid  1 mg Oral Daily    therapeutic multivitamin-minerals  1 tablet Oral Daily    pantoprazole  40 mg Oral Daily    budesonide-formoterol  2 puff Inhalation BID    Vitamin D  2,000 Units Oral Daily    sodium chloride flush  10 mL Intravenous 2 times per day    ipratropium-albuterol  1 ampule Inhalation Q4H WA    azithromycin  500 mg Intravenous Q24H       IV Infusions (if any):   sodium chloride 100 mL/hr at 12/16/20 1400    dextrose      dexmedetomidine (PRECEDEX) IV infusion 0.6 mcg/hr (12/16/20 1512)    norepinephrine 0.5 mcg/min (12/16/20 1514)       Diagnostics:   Telemetry: Sinus      Labs:   CBC:   Recent Labs     12/15/20  0442 12/16/20  0629   WBC 4.9 9.7   HGB 8.3* 9.2*   HCT 26.2* 28.6*   * 115*     BMP:   Recent Labs     12/15/20  1516 12/16/20  0629    142   K 3.9 4.3   CO2 24 29   BUN 14 14   CREATININE 0.81 0.49*   LABGLOM >60 >60   GLUCOSE 273* 135*     BNP: No results for input(s): BNP in the last 72 hours. PT/INR: No results for input(s): PROTIME, INR in the last 72 hours. APTT:No results for input(s): APTT in the last 72 hours. CARDIAC ENZYMES:No results for input(s): CKTOTAL, CKMB, CKMBINDEX, TROPONINI in the last 72 hours. FASTING LIPID PANEL:  Lab Results   Component Value Date    HDL 47 03/11/2020    TRIG 98 03/11/2020     LIVER PROFILE:No results for input(s): AST, ALT, LABALBU in the last 72 hours. ASSESSMENT:    Patient Active Problem List   Diagnosis    Mass of right lung    Pleural effusion, right    Tobacco dependence    Chest pain at rest    Asthma    Cancer ([address]    PUD (peptic ulcer disease)    Pneumonia    Adenocarcinoma, lung, right ([address]    Pneumonia due to organism    Sinus tachycardia    Severely underweight adult    Acute on chronic respiratory failure with hypoxia and hypercapnia (HCC)    Severe malnutrition ([address]    Palliative care encounter    Goals of care, counseling/discussion    DNR (do not resuscitate) discussion    Multifocal pneumonia       PLAN:    1. Tachycardia  2. Adenocarcinoma of the lung  3.  Cachexia  Patient should continue supportive therapy. No indication for further cardiac testing. Cardiology will sign off at this time. Please call with questions. Please see orders. Discussed with patient and nursing.     Millard Boas, MD weeping edema

## 2024-01-20 NOTE — H&P ADULT - PROBLEM SELECTOR PLAN 4
atrial fibrillation on xarelto, atrial flutter s/p ablation, bradycardia and syncope s/p ppm implant,   - continue home xarelto  - remote tele

## 2024-01-20 NOTE — H&P ADULT - PROBLEM SELECTOR PLAN 2
TTE 2/9/22 LVEF 40-45%, LA 5.3cm, mild con LVH, mod inferior and posterior hypokinesis, mod MR, trace AI, mild PI, mod TR, RVSP 76   - increased SOB with minimal activity concern for CHF exacerbation and fluid overload  - gave lasix 40mg IVP in ED  - continue lasix 40mg IVP BID  - f/u TTE  - continue coreg and dogoxin  - f/u AM digoxin level  - cardio consulted (Dr Drake), appreciate recs

## 2024-01-20 NOTE — CONSULT NOTE ADULT - NS ATTEND AMEND GEN_ALL_CORE FT
I have personally seen and examined the patient in detail.  I have spoken to the provider regarding the assessment and plan of care.  I have made changes to the note accordingly.    73F PMH atrial fibrillation and atrial flutter s/p ablation, HTN, DM, cardiomyopathy with moderate LV dysfunction, pHTN, depression, bradycardia and conduction disease s/p Medtronic dual chamber pacemaker implant, edema, hypothyroidism, hyperparathyroidism, presents with shortness of breath, chest pressure and LE edema. Cardiology called for SOB.     A fib/Flutter, HTN, Cardiomyopathy, mod LVSD, Pulm HTN, PPM  - Pt p/w ADHF  - She saw her cardiologist and was started on lasix 20 mg PO daily last week.   - Known CHF w/ mod LVSD and pulm HTN   - Echo: TTE 2/9/22 LVEF 40-45%, LA 5.3cm, mild con LVH, mod inferior and posterior hypokinesis, mod MR, trace AI, mild PI, mod TR, RVSP 76   - repeat TTE  - BNP 1029  - CXR with diffuse mild congestive changes  - Would diurese w/ Lasix 40 mg IV x 1 and start Lasix 40 mg IV BID  - Monitor on tele   - Monitor strict i/o and daily weight     - known A fib  - rate controlled at present   - Continue Coreg and digoxin  - Continue Xarelto    - EKG showed A paced rhythm/ Aflib @ 61  - Troponin HsI 50.7  - No evidence of any active ischemia

## 2024-01-20 NOTE — H&P ADULT - ATTENDING COMMENTS
74 y/o F with PMHx of atrial fibrillation on xarelto, atrial flutter s/p ablation, bradycardia and syncope s/p ppm implant, HTN, DM, cardiomyopathy with moderate LV dysfunction, hypothyroid, hyperparathyroidism, depression, pulmonary HTN presents to ED on 1/20 with progressively worsening SOB and worsening weeping b/l LE wounds.      Cards and ID consulted, concern for acute CHF exacerbation, will f/u 2d echo, and treated with lasix IV for now, and pt started on clindamycin for concern for cellulitis based on allergy profile.  Will f/u ID recs on abx regiment.  Monitor WBC and fever curves.    Lexx Cuenca, Attending Physician I will STOP taking the medications listed below when I get home from the hospital:  None

## 2024-01-20 NOTE — ED PROVIDER NOTE - SKIN, MLM
Skin normal color for race, warm erythema bl le with vasculitis and edema weeping Skin normal color for race, warm erythema bl le with vasculitis and edema weeping with ulcers

## 2024-01-20 NOTE — CHART NOTE - NSCHARTNOTEFT_GEN_A_CORE
Pharmacy called recommending lower dose of admelog. Per H&P pt is on humalog 90U AM and 90U PM at home for T2DM. Pt initially placed on 30u BID on admission, but pharmacy now recommending lower dose as pt's glucose is in the 141.     Ordered changed to 15u BID with breakfast and dinner.    Diabetes specialist consult already placed for high insulin dose at home, f/u further recs. Pharmacy called recommending lower dose of admelog. Per H&P pt is on humalog 90U AM and 90U PM at home for T2DM. Pt initially placed on 30u BID on admission, but pharmacy now recommending lower dose as pt's glucose is in the 141.     Ordered changed to 15u BID with breakfast and dinner, pt remains on moderate dose ISS.     Diabetes specialist consult already placed for high insulin dose at home, f/u further recs. Pharmacy called recommending lower dose of Admelog. Per H&P pt is on Humalog 90U AM and 90U PM at home for T2DM. Pt initially placed on 30u BID on admission, but pharmacy now recommending lower dose as pt's glucose is in the 141.     Ordered changed to 15u BID with breakfast and dinner, pt remains on moderate dose ISS.     Diabetes specialist consult already placed for high insulin dose at home, f/u further recs.    ADDENDUM:  Pharmacy called, pt takes Humulin R at home. After discussion with pharmacist, Humalog 15 u BID changed to Humulin R 30 u BID. Pharmacy called recommending lower dose of Admelog. Per H&P pt is on Humalog 90U AM and 90U PM at home for T2DM. Pt initially placed on 30u BID on admission, but pharmacy now recommending lower dose as pt's glucose is in the 141.     Ordered changed to 15u BID with breakfast and dinner, pt remains on moderate dose ISS.     Diabetes specialist consult already placed for high insulin dose at home, f/u further recs.    ADDENDUM:  Pharmacy called, pt takes Humulin R U-500 at home. After discussion with pharmacist, will start Humulin 50u BID. Pt remains on moderate dose ISS. Further management of diabetes orders per primary team.

## 2024-01-20 NOTE — ED ADULT NURSE REASSESSMENT NOTE - NS ED NURSE REASSESS COMMENT FT1
Pt is AOX4, ambulating with some assistance. b/l leg swelling, weeping edema observed. Medication tolerated well. no c/o CP/SOB/HA. Denies n/v/d. Denies blurry vision. Care ongoing.

## 2024-01-20 NOTE — H&P ADULT - NSHPSOCIALHISTORY_GEN_ALL_CORE
EtOH: Denies  Tobacco: Denies  Illicit drugs: Denies  Lives alone, not vaccinated, Colonoscopy never, pap smears years ago, last Mammo 5 years ago. Partner passed away in July, 2023. Follows psych for depression

## 2024-01-20 NOTE — ED PROVIDER NOTE - CLINICAL SUMMARY MEDICAL DECISION MAKING FREE TEXT BOX
Patient is a 73 year old white female with history of atrial fibrillation diabetes hypertension cardiomyopathy hyperparathyroidism and hypothyroidism who presents now with weeping edema left greater than right lower extremity with erythema.  No fever no chills no nausea no vomiting no trauma.  Does state slight increased shortness of breath recently.  This case will require complex and complete evaluation including EKG labs Doppler and meds.

## 2024-01-20 NOTE — ED PROVIDER NOTE - ATTENDING APP SHARED VISIT CONTRIBUTION OF CARE
Examination reveals a elderly white female in no acute distress with fine dry rales at the base of both lungs heart irregular without any murmurs abdomen is protuberant soft nontender extremities reveal significant weeping pitting edema left lower extremity 2-3+ with 2+ pitting edema none weeping right lower extremity.  There is surrounding erythema and slight warmth around the left lower extremity weeping areas.  I agree with plan and management outlined by PA.

## 2024-01-20 NOTE — ED PROVIDER NOTE - NSICDXPASTMEDICALHX_GEN_ALL_CORE_FT
PAST MEDICAL HISTORY:  Atrial fibrillation and flutter s/p ablation    Bradycardia s/p MDT PPM    DM (diabetes mellitus)     H/O cardiomyopathy     H/O hyperparathyroidism     H/O pulmonary hypertension     HTN (hypertension)     Hypothyroid

## 2024-01-20 NOTE — H&P ADULT - PROBLEM SELECTOR PLAN 1
Pt with progressively worsening b/l LE leg wounds weeping serosanguinous fluid, L>R. b/l LE with extensive pitting edema from foot to hip  - minimal erythema, serosanguinous fluid, afebrile, normal white count. given pmh DM, CHF, and chronicity of edema more likely non-healing wounds vs low risk for cellulitis  - empirically given clindamycin in ED, may continue for now  - f/u BCx  - f/u wound culture  - ID consulted (Dr Driscoll)

## 2024-01-20 NOTE — H&P ADULT - HISTORY OF PRESENT ILLNESS
IN PROGRESS    72yo F with PMH A-fib (on xarelto), PPM, HFpEF (EF 40-45% 2/9/2022), hypothyroidism, DM, HTN, pulmonary HTN, hyperparathyroidism, cardiomyopathy, presents to ED on 1/20 with shortness of breath.  Patient reports increasing shortness of breath over the past few weeks with chest pressure.  Patient also notes increased lower extremity swelling with edema.  Patient denies fever, nausea, vomiting, diarrhea    Denies fever, chills, chest pain, palpitations, SOB, cough, abdominal pain, nausea, vomiting, diarrhea, constipation, urinary frequency, urgency, or dysuria, headaches, changes in vision, dizziness, numbness, tingling.  Denies recent travel, recent antibiotic use, or sick contacts.    ED Course:   Vitals: BP: 182/92, HR: 68 , Temp: 98.3 , RR: 16 , SpO2: 98% on RA   Labs:  INR 1.78, Pro BNP 1029,   UA: protein 300, trace LE  CXR: Mild CHF at this time. Stable cardiac findings.  LE Duplex: No evidence of deep venous thrombosis in either lower extremity.  EKG: A-paced 61, Wide QRS rhythm,  Bifascicular block, Left ventricular hypertrophy with repolarization abnormality   Received clindamycin 900mg IVPB x1 in the ED    74 y/o F with PMHx of atrial fibrillation on xarelto, atrial flutter s/p ablation, bradycardia and syncope s/p ppm implant, HTN, DM, cardiomyopathy with moderate LV dysfunction, hypothyroid, hyperparathyroidism, depression, pulmonary HTN presents to ED on 1/20 with progressively worsening SOB and worsening weeping b/l LE wounds.  Patient reports increasing shortness of breath over the past few weeks that has progressed to the level of affecting her daily activities. Lives alone and normally able to care for herself but has been increasingly unable to due to SOB.  Patient also notes increased lower extremity swelling with edema. Pt has baseline level of edema, but this is worse than usual and has been accompanied by weeping wounds that have progressively worsened. Of note pt was recently started on lasix 20mg PO which has not provided much help. Notes that swelling involves her legs up to the hip and her abdomen. Denies fever, chills, chest pain, cough, abdominal pain, nausea, vomiting, diarrhea, constipation, urinary frequency, urgency, or dysuria, headaches, changes in vision, dizziness, numbness, tingling.    ED Course:   Vitals: BP: 182/92, HR: 68 , Temp: 98.3 , RR: 16 , SpO2: 98% on RA   Labs:  INR 1.78, Pro BNP 1029,   UA: protein 300, trace LE  CXR: Mild CHF at this time. Stable cardiac findings.  LE Duplex: No evidence of deep venous thrombosis in either lower extremity.  EKG: A-paced 61, Wide QRS rhythm,  Bifascicular block, Left ventricular hypertrophy with repolarization abnormality   Received clindamycin 900mg IVPB x1 in the ED

## 2024-01-20 NOTE — H&P ADULT - NSHPPHYSICALEXAM_GEN_ALL_CORE
T(C): 36.8 (01-20-24 @ 12:24), Max: 36.8 (01-20-24 @ 12:24)  HR: 68 (01-20-24 @ 12:24) (68 - 68)  BP: 182/92 (01-20-24 @ 12:24) (182/92 - 182/92)  RR: 16 (01-20-24 @ 12:24) (16 - 16)  SpO2: 98% (01-20-24 @ 12:24) (98% - 98%)    GENERAL: patient appears well, no acute distress, appropriately interactive  EYES: sclera clear, no exudates  ENMT: oropharynx clear without erythema, no exudates, moist mucous membranes  NECK: supple, soft  LUNGS: decreased breath sounds at bases b/l, clear to auscultation, symmetric breath sounds, no wheezing or rhonchi appreciated  HEART: soft S1/S2, regular rate and rhythm, no murmurs noted, +extensive 2-3+ pitting edema in b/l LE from foot to hip  GASTROINTESTINAL: abdomen is soft, nontender, nondistended, normoactive bowel sounds  EXT: +b/l LE small circular leg wounds weeping serosanguinous fluid, L>R. +Inguinal skin fold rash under pannus  MUSCULOSKELETAL: no clubbing or cyanosis, no obvious deformity  NEUROLOGIC: awake, alert, oriented x3, good muscle tone in all 4 extremities  HEME/LYMPH: no obvious ecchymosis or petechiae

## 2024-01-20 NOTE — ED ADULT NURSE NOTE - OBJECTIVE STATEMENT
pt is AOX4, came to the ED with c.o b/l weeping edema. pt is able to ambulate. b/l swelling and weeping edema observed, SOB when ambulating noted. pt has allergies to vancomycin. pt states she has not seen anyone for b/l swelling. Denies n/v/d. Denies Cp/HA. Denies blurry vision. Pt is able to speak clearly. Pending radiology and lab results. care ongoing.

## 2024-01-20 NOTE — H&P ADULT - ASSESSMENT
74 y/o F with PMHx of atrial fibrillation on xarelto, atrial flutter s/p ablation, bradycardia and syncope s/p ppm implant, HTN, DM, cardiomyopathy with moderate LV dysfunction, hypothyroid, hyperparathyroidism, depression, pulmonary HTN presents to ED on 1/20 with progressively worsening SOB and worsening weeping b/l LE wounds.

## 2024-01-20 NOTE — ED PROVIDER NOTE - NSICDXPASTSURGICALHX_GEN_ALL_CORE_FT
PAST SURGICAL HISTORY:  Cardiac pacemaker     H/O cardiac radiofrequency ablation     H/O carpal tunnel repair     History of parathyroid surgery

## 2024-01-20 NOTE — H&P ADULT - PROBLEM SELECTOR PLAN 5
DM on humalong 90U AM and 90U PM at home  - start on 50U BID  - REBEKAH DM on humalong 90U AM and 90U PM at home  - start on 30U BID  - REBEKAH

## 2024-01-20 NOTE — ED PROVIDER NOTE - PROGRESS NOTE DETAILS
pt refused rectal fecal occult. dr roman will kindly admit. dr ventura will kindly eval pt in ed. cardio

## 2024-01-21 LAB
A1C WITH ESTIMATED AVERAGE GLUCOSE RESULT: 8.8 % — HIGH (ref 4–5.6)
ALBUMIN SERPL ELPH-MCNC: 3.4 G/DL — SIGNIFICANT CHANGE UP (ref 3.3–5)
ALP SERPL-CCNC: 114 U/L — SIGNIFICANT CHANGE UP (ref 40–120)
ALT FLD-CCNC: 19 U/L — SIGNIFICANT CHANGE UP (ref 12–78)
ANION GAP SERPL CALC-SCNC: 5 MMOL/L — SIGNIFICANT CHANGE UP (ref 5–17)
AST SERPL-CCNC: 26 U/L — SIGNIFICANT CHANGE UP (ref 15–37)
BASOPHILS # BLD AUTO: 0.05 K/UL — SIGNIFICANT CHANGE UP (ref 0–0.2)
BASOPHILS NFR BLD AUTO: 1 % — SIGNIFICANT CHANGE UP (ref 0–2)
BILIRUB SERPL-MCNC: 1 MG/DL — SIGNIFICANT CHANGE UP (ref 0.2–1.2)
BUN SERPL-MCNC: 17 MG/DL — SIGNIFICANT CHANGE UP (ref 7–23)
CALCIUM SERPL-MCNC: 9.3 MG/DL — SIGNIFICANT CHANGE UP (ref 8.5–10.1)
CHLORIDE SERPL-SCNC: 101 MMOL/L — SIGNIFICANT CHANGE UP (ref 96–108)
CHOLEST SERPL-MCNC: 124 MG/DL — SIGNIFICANT CHANGE UP
CO2 SERPL-SCNC: 34 MMOL/L — HIGH (ref 22–31)
CREAT SERPL-MCNC: 0.83 MG/DL — SIGNIFICANT CHANGE UP (ref 0.5–1.3)
DIGOXIN SERPL-MCNC: 0.9 NG/ML — SIGNIFICANT CHANGE UP (ref 0.8–2)
EGFR: 73 ML/MIN/1.73M2 — SIGNIFICANT CHANGE UP
EOSINOPHIL # BLD AUTO: 0.19 K/UL — SIGNIFICANT CHANGE UP (ref 0–0.5)
EOSINOPHIL NFR BLD AUTO: 3.6 % — SIGNIFICANT CHANGE UP (ref 0–6)
ESTIMATED AVERAGE GLUCOSE: 206 MG/DL — HIGH (ref 68–114)
GLUCOSE SERPL-MCNC: 304 MG/DL — HIGH (ref 70–99)
HCT VFR BLD CALC: 42.7 % — SIGNIFICANT CHANGE UP (ref 34.5–45)
HCV AB S/CO SERPL IA: 0.15 S/CO — SIGNIFICANT CHANGE UP (ref 0–0.99)
HCV AB SERPL-IMP: SIGNIFICANT CHANGE UP
HDLC SERPL-MCNC: 43 MG/DL — LOW
HGB BLD-MCNC: 13.4 G/DL — SIGNIFICANT CHANGE UP (ref 11.5–15.5)
IMM GRANULOCYTES NFR BLD AUTO: 0.2 % — SIGNIFICANT CHANGE UP (ref 0–0.9)
LIPID PNL WITH DIRECT LDL SERPL: 64 MG/DL — SIGNIFICANT CHANGE UP
LYMPHOCYTES # BLD AUTO: 1.48 K/UL — SIGNIFICANT CHANGE UP (ref 1–3.3)
LYMPHOCYTES # BLD AUTO: 28.2 % — SIGNIFICANT CHANGE UP (ref 13–44)
MCHC RBC-ENTMCNC: 28.9 PG — SIGNIFICANT CHANGE UP (ref 27–34)
MCHC RBC-ENTMCNC: 31.4 GM/DL — LOW (ref 32–36)
MCV RBC AUTO: 92 FL — SIGNIFICANT CHANGE UP (ref 80–100)
MONOCYTES # BLD AUTO: 0.49 K/UL — SIGNIFICANT CHANGE UP (ref 0–0.9)
MONOCYTES NFR BLD AUTO: 9.4 % — SIGNIFICANT CHANGE UP (ref 2–14)
NEUTROPHILS # BLD AUTO: 3.02 K/UL — SIGNIFICANT CHANGE UP (ref 1.8–7.4)
NEUTROPHILS NFR BLD AUTO: 57.6 % — SIGNIFICANT CHANGE UP (ref 43–77)
NON HDL CHOLESTEROL: 81 MG/DL — SIGNIFICANT CHANGE UP
NRBC # BLD: 0 /100 WBCS — SIGNIFICANT CHANGE UP (ref 0–0)
PLATELET # BLD AUTO: 154 K/UL — SIGNIFICANT CHANGE UP (ref 150–400)
POTASSIUM SERPL-MCNC: 4.4 MMOL/L — SIGNIFICANT CHANGE UP (ref 3.5–5.3)
POTASSIUM SERPL-SCNC: 4.4 MMOL/L — SIGNIFICANT CHANGE UP (ref 3.5–5.3)
PROT SERPL-MCNC: 7.5 G/DL — SIGNIFICANT CHANGE UP (ref 6–8.3)
RBC # BLD: 4.64 M/UL — SIGNIFICANT CHANGE UP (ref 3.8–5.2)
RBC # FLD: 14.8 % — HIGH (ref 10.3–14.5)
SODIUM SERPL-SCNC: 140 MMOL/L — SIGNIFICANT CHANGE UP (ref 135–145)
TRIGL SERPL-MCNC: 90 MG/DL — SIGNIFICANT CHANGE UP
WBC # BLD: 5.24 K/UL — SIGNIFICANT CHANGE UP (ref 3.8–10.5)
WBC # FLD AUTO: 5.24 K/UL — SIGNIFICANT CHANGE UP (ref 3.8–10.5)

## 2024-01-21 PROCEDURE — 99233 SBSQ HOSP IP/OBS HIGH 50: CPT

## 2024-01-21 PROCEDURE — 99233 SBSQ HOSP IP/OBS HIGH 50: CPT | Mod: GC

## 2024-01-21 RX ORDER — LOSARTAN POTASSIUM 100 MG/1
25 TABLET, FILM COATED ORAL DAILY
Refills: 0 | Status: DISCONTINUED | OUTPATIENT
Start: 2024-01-21 | End: 2024-01-22

## 2024-01-21 RX ORDER — HYDRALAZINE HCL 50 MG
5 TABLET ORAL ONCE
Refills: 0 | Status: COMPLETED | OUTPATIENT
Start: 2024-01-21 | End: 2024-01-21

## 2024-01-21 RX ADMIN — Medication 4: at 16:44

## 2024-01-21 RX ADMIN — Medication 40 MILLIGRAM(S): at 06:52

## 2024-01-21 RX ADMIN — Medication 5 MILLIGRAM(S): at 12:12

## 2024-01-21 RX ADMIN — INSULIN HUMAN 50 UNIT(S): 100 INJECTION, SOLUTION SUBCUTANEOUS at 10:43

## 2024-01-21 RX ADMIN — INSULIN HUMAN 50 UNIT(S): 100 INJECTION, SOLUTION SUBCUTANEOUS at 17:31

## 2024-01-21 RX ADMIN — ZOLPIDEM TARTRATE 5 MILLIGRAM(S): 10 TABLET ORAL at 22:47

## 2024-01-21 RX ADMIN — Medication 1 APPLICATION(S): at 21:53

## 2024-01-21 RX ADMIN — Medication 650 MILLIGRAM(S): at 21:18

## 2024-01-21 RX ADMIN — Medication 650 MILLIGRAM(S): at 03:08

## 2024-01-21 RX ADMIN — CARVEDILOL PHOSPHATE 12.5 MILLIGRAM(S): 80 CAPSULE, EXTENDED RELEASE ORAL at 20:18

## 2024-01-21 RX ADMIN — LOSARTAN POTASSIUM 25 MILLIGRAM(S): 100 TABLET, FILM COATED ORAL at 12:11

## 2024-01-21 RX ADMIN — Medication 125 MICROGRAM(S): at 06:53

## 2024-01-21 RX ADMIN — Medication 8: at 10:10

## 2024-01-21 RX ADMIN — Medication 40 MILLIGRAM(S): at 17:32

## 2024-01-21 RX ADMIN — Medication 250 MICROGRAM(S): at 06:52

## 2024-01-21 RX ADMIN — Medication 650 MILLIGRAM(S): at 20:18

## 2024-01-21 RX ADMIN — RIVAROXABAN 20 MILLIGRAM(S): KIT at 20:18

## 2024-01-21 RX ADMIN — CARVEDILOL PHOSPHATE 12.5 MILLIGRAM(S): 80 CAPSULE, EXTENDED RELEASE ORAL at 06:52

## 2024-01-21 NOTE — OCCUPATIONAL THERAPY INITIAL EVALUATION ADULT - PERTINENT HX OF CURRENT PROBLEM, REHAB EVAL
As per H&P: "74 y/o F with PMHx of atrial fibrillation on xarelto, atrial flutter s/p ablation, bradycardia and syncope s/p ppm implant, HTN, DM, cardiomyopathy with moderate LV dysfunction, hypothyroid, hyperparathyroidism, depression, pulmonary HTN presents to ED on 1/20 with progressively worsening SOB and worsening weeping b/l LE wounds.  Patient reports increasing shortness of breath over the past few weeks that has progressed to the level of affecting her daily activities. Lives alone and normally able to care for herself but has been increasingly unable to due to SOB.  Patient also notes increased lower extremity swelling with edema. Pt has baseline level of edema, but this is worse than usual and has been accompanied by weeping wounds that have progressively worsened. Of note pt was recently started on lasix 20mg PO which has not provided much help. Notes that swelling involves her legs up to the hip and her abdomen. Denies fever, chills, chest pain, cough, abdominal pain, nausea, vomiting, diarrhea, constipation, urinary frequency, urgency, or dysuria, headaches, changes in vision, dizziness, numbness, tingling."   Admission dx: Shortness of breath  Doppler b/l LE: No evidence of DVT  X-Ray Chest: "IMPRESSION: Mild CHF at this time. Stable cardiac findings."

## 2024-01-21 NOTE — PROGRESS NOTE ADULT - PROBLEM SELECTOR PLAN 3
PMH HTN  - continue home coreg  - lasix as above Chronic.   - Pt was outpatient on Hctz 25 mg qd and was discontinued 2 weeks ago by cardio and started Lasix.   - Persistent elevated BP since admission.   - Continue Lasix 40mg IV BID  - Continue home coreg  - Start losartan 25mg qd.   - Monitor BP, may need med adjustment. Chronic HTN  - hypertensive urgency with SBP in 180s  - Pt was outpatient on Hctz 25 mg qd and was discontinued 2 weeks ago by cardio and started Lasix.   - Persistent elevated BP since admission  - Continue Lasix 40mg IV BID  - Continue home coreg  - Start losartan 25mg daily  - Monitor BP

## 2024-01-21 NOTE — OCCUPATIONAL THERAPY INITIAL EVALUATION ADULT - LEVEL OF INDEPENDENCE: SIT/STAND, REHAB EVAL
ModAx1 on first attempt, MinAx1 on second attempt/minimum assist (75% patients effort)/moderate assist (50% patients effort)

## 2024-01-21 NOTE — ED ADULT NURSE REASSESSMENT NOTE - NS ED NURSE REASSESS COMMENT FT1
pt restring in room per scheduled medication and lasix pt refusing notes " I will not take medication until tuna is provided" pt educated on need for medication and per provider at bedside pt agrees to take medication at this time, will reassess pt BP, Heron RN

## 2024-01-21 NOTE — OCCUPATIONAL THERAPY INITIAL EVALUATION ADULT - LEVEL OF INDEPENDENCE:TOILET, OT EVAL
PHYSICIAN ORDERS AND DISCHARGE INSTRUCTIONS     NOTE: Upon discharge from the [address], you will receive a patient experience survey. We would be grateful if you would take the time to fill this survey out. Wound care order history:                 KATRIN's   Right       Left               Date:              Cultures:                Grafts:                Antibiotics:           Continuing wound care orders and information:                 Residence:                Continue home health care with:               Your wound-care supplies will be provided by: Wound cleansing:               Do not scrub or use excessive force. Wash hands with soap and water before and after dressing changes. Prior to applying a clean dressing, cleanse wound with normal saline, wound cleanser, or mild soap and water. Ask the physician or nurse before getting the wound(s) wet in a shower     Daily Wound management:              Keep weight off wounds and reposition every 2 hours. Avoid standing for long periods of time. Apply wraps/stockings in AM and remove at bedtime. If swelling is present, elevate legs to the level of the heart or above for 30 minutes 4-5 times a day and/or when sitting. When taking antibiotics take entire prescription as ordered by physician do not stop taking until medicine is all gone. Orders for this week: 12/9/22                  Rx:     Right Lateral Ankle - Wash with soap and water, pat dry. Apply Gentamicin and Kandice to wound bed. Cover with ABD. Secure with conform and tape. Wear Tubi F. Change daily. Follow up with Jannette Arizmendi CNP in 1 week in the wound care center. Call [phone] for any questions or concerns.   Date__________   Time____________ minimum assist (75% patients effort)

## 2024-01-21 NOTE — PHYSICAL THERAPY INITIAL EVALUATION ADULT - PERTINENT HX OF CURRENT PROBLEM, REHAB EVAL
Pt is a 74 y/o F with PMHx of atrial fibrillation on xarelto, atrial flutter s/p ablation, bradycardia and syncope s/p ppm implant, HTN, DM, cardiomyopathy with moderate LV dysfunction, hypothyroid, hyperparathyroidism, depression, pulmonary HTN presents to ED on 1/20 with progressively worsening SOB and worsening weeping b/l LE wounds.  Patient reports increasing shortness of breath over the past few weeks that has progressed to the level of affecting her daily activities. Lives alone and normally able to care for herself but has been increasingly unable to due to SOB.  Patient also notes increased lower extremity swelling with edema. Pt has baseline level of edema, but this is worse than usual and has been accompanied by weeping wounds that have progressively worsened. Of note pt was recently started on lasix 20mg PO which has not provided much help. Notes that swelling involves her legs up to the hip and her abdomen.

## 2024-01-21 NOTE — PHYSICAL THERAPY INITIAL EVALUATION ADULT - LEVEL OF INDEPENDENCE: SIT/STAND, REHAB EVAL
mod Ax 1 on first attempt and unable to achieve stand; minAx1 for second attempt/minimum assist (75% patients effort)/moderate assist (50% patients effort)

## 2024-01-21 NOTE — PROGRESS NOTE ADULT - SUBJECTIVE AND OBJECTIVE BOX
ROSITA GUTIERREZ is a 73yFemale , patient examined and chart reviewed.     INTERVAL HPI/ OVERNIGHT EVENTS:   No events. Afebrile.    PAST MEDICAL & SURGICAL HISTORY:  Hypothyroid  DM (diabetes mellitus)  HTN (hypertension)  Atrial fibrillation and flutter  s/p ablation  Bradycardia  s/p MDT PPM  H/O pulmonary hypertension  H/O hyperparathyroidism  H/O cardiomyopathy  Cardiac pacemaker  H/O cardiac radiofrequency ablation  H/O carpal tunnel repair  History of parathyroid surgery    For details regarding the patient's social history, family history, and other miscellaneous elements, please refer the initial infectious diseases consultation and/or the admitting history and physical examination for this admission.    ROS:  CONSTITUTIONAL:  Negative fever or chills  EYES:  Negative  blurry vision or double vision  CARDIOVASCULAR:  Negative for chest pain or palpitations  RESPIRATORY:  Negative for cough, wheezing, or SOB   GASTROINTESTINAL:  Negative for nausea, vomiting, diarrhea, constipation, or abdominal pain  GENITOURINARY:  Negative frequency, urgency or dysuria  NEUROLOGIC:  No headache, confusion, dizziness, lightheadedness  All other systems were reviewed and are negative     ALLERGIES  penicillin (Short breath; Rash)  statins (Vomiting)  vancomycin (Short breath; Rash)  erythromycin (Rash)      Current inpatient medications :    ANTIBIOTICS/RELEVANT:      acetaminophen     Tablet .. 650 milliGRAM(s) Oral every 6 hours PRN  carvedilol 12.5 milliGRAM(s) Oral every 12 hours  clotrimazole 1% Cream 1 Application(s) Topical two times a day  dextrose 5%. 1000 milliLiter(s) IV Continuous <Continuous>  dextrose 5%. 1000 milliLiter(s) IV Continuous <Continuous>  dextrose 5%. 1000 milliLiter(s) IV Continuous <Continuous>  dextrose 5%. 1000 milliLiter(s) IV Continuous <Continuous>  dextrose 5%. 1000 milliLiter(s) IV Continuous <Continuous>  dextrose 50% Injectable 25 Gram(s) IV Push once  dextrose 50% Injectable 25 Gram(s) IV Push once  dextrose 50% Injectable 25 Gram(s) IV Push once  dextrose 50% Injectable 12.5 Gram(s) IV Push once  dextrose 50% Injectable 12.5 Gram(s) IV Push once  dextrose Oral Gel 15 Gram(s) Oral once PRN  digoxin     Tablet 250 MICROGram(s) Oral daily  furosemide   Injectable 40 milliGRAM(s) IV Push two times a day  glucagon  Injectable 1 milliGRAM(s) IntraMuscular once  glucagon  Injectable 1 milliGRAM(s) IntraMuscular once  insulin lispro (ADMELOG) corrective regimen sliding scale   SubCutaneous three times a day before meals  insulin lispro (ADMELOG) corrective regimen sliding scale   SubCutaneous at bedtime  insulin regular (concentrated) human recombinant U-500 50 Unit(s) SubCutaneous two times a day with meals  levothyroxine 125 MICROGram(s) Oral daily  losartan 25 milliGRAM(s) Oral daily  rivaroxaban 20 milliGRAM(s) Oral with dinner  zolpidem 5 milliGRAM(s) Oral at bedtime PRN  zolpidem 5 milliGRAM(s) Oral at bedtime PRN      Objective:    T(C): 36.7 (01-21-24 @ 18:40), Max: 36.9 (01-21-24 @ 12:39)  HR: 67 (01-21-24 @ 18:40) (61 - 76)  BP: 171/69 (01-21-24 @ 18:40) (109/63 - 193/68)  RR: 18 (01-21-24 @ 18:40) (16 - 18)  SpO2: 95% (01-21-24 @ 18:40) (95% - 100%)      Physical Exam:  General: no acute distress  Neck: supple, trachea midline  Lungs: clear, no wheeze/rhonchi  Cardiovascular: regular rate and rhythm, S1 S2  Abdomen: soft, nontender,  bowel sounds normal  Neurological: alert and oriented x3  Skin: no rash  Extremities: + weeping edema + small ulcers  Chronic stasis      LABS:                          13.4   5.24  )-----------( 154      ( 21 Jan 2024 06:28 )             42.7       01-21    140  |  101  |  17  ----------------------------<  304<H>  4.4   |  34<H>  |  0.83    Ca    9.3      21 Jan 2024 06:28    TPro  7.5  /  Alb  3.4  /  TBili  1.0  /  DBili  x   /  AST  26  /  ALT  19  /  AlkPhos  114  01-21      PT/INR - ( 20 Jan 2024 13:30 )   PT: 20.5 sec;   INR: 1.78 ratio         PTT - ( 20 Jan 2024 13:30 )  PTT:37.5 sec      MICROBIOLOGY:    Culture - Blood (collected 20 Jan 2024 13:30)  Source: .Blood Blood-Peripheral  Preliminary Report (21 Jan 2024 22:02):    No growth at 24 hours    Culture - Blood (collected 20 Jan 2024 13:25)  Source: .Blood Blood-Peripheral  Preliminary Report (21 Jan 2024 22:02):    No growth at 24 hours    RADIOLOGY & ADDITIONAL STUDIES:  ACC: [id number] EXAM:  US DPLX LWR EXT VEINS COMPL BI   ORDERED BY:   RAZA JESSICA     PROCEDURE DATE:  01/20/2024          INTERPRETATION:  CLINICAL INFORMATION: Bilateral leg swelling.    COMPARISON: None available.    TECHNIQUE: Duplex sonography of the BILATERAL LOWER extremity veins with   color and spectral Doppler, with and without compression.    FINDINGS:    RIGHT:  Normal compressibility of the RIGHT common femoral, femoral and popliteal   veins.  Doppler examination shows normal spontaneous and phasic flow.  Limited visualized of the RIGHT calf veins. No RIGHT calf vein thrombosis   detected.    LEFT:  Normal compressibility of the LEFT common femoral, femoral and popliteal   veins.  Doppler examination shows normal spontaneous and phasic flow.  Limited visualized of the LEFT calf veins. No LEFT calf vein thrombosis   detected.    IMPRESSION:  No evidence of deep venous thrombosis in either lower extremity.      ACC: [id number] EXAM:  XR CHEST PORTABLE IMMED 1V   ORDERED BY: RAZA JESSICA     PROCEDURE DATE:  01/20/2024          INTERPRETATION:  AP chest on January 20, 2024 at 1:17 PM. Patient has   sepsis.    Heart enlargement and left-sided pacemaker again noted.    There are diffuse mild congestive changes somewhat increased from March 8, 2012.    IMPRESSION: Mild CHF at this time. Stable cardiac findings.      Assessment :   74YO F with PMHx of atrial fibrillation on xarelto, atrial flutter s/p ablation, bradycardia and syncope s/p ppm implant, HTN, DM, cardiomyopathy with moderate LV dysfunction, hypothyroid, hyperparathyroidism, depression, pulmonary HTN presents to ED with progressively worsening SOB and worsening wander LE edema with weeping b/l LE wounds.  Fluid overload with CHD exacerbation wander LE edema/anasarca  CXR with CHF  No concern for leg cellulitis  Cultures NGTD    Plan :   Monitor off antibiotics  Trend temps and cbc  Diurese per cardio  Elevate leg  Local wound care  Pulm toileting  Stable from ID standpoint    Continue with present regiment.  Appropriate use of antibiotics and adverse effects reviewed.      > 35 minutes were spent in direct patient care reviewing notes, medications ,labs data/ imaging , discussion with multidisciplinary team.    Thank you for allowing me to participate in care of your patient .    Gina Driscoll MD  Infectious Disease  [phone]

## 2024-01-21 NOTE — PROGRESS NOTE ADULT - ASSESSMENT
72 y/o F with PMHx of atrial fibrillation on xarelto, atrial flutter s/p ablation, bradycardia and syncope s/p ppm implant, HTN, DM, cardiomyopathy with moderate LV dysfunction, hypothyroid, hyperparathyroidism, depression, pulmonary HTN presents to ED on 1/20 with progressively worsening SOB and worsening weeping b/l LE wounds.   72 y/o F with PMHx of atrial fibrillation on xarelto, atrial flutter s/p ablation, bradycardia and syncope s/p PPM, HTN, DM2, HFrEF w/ moderate LV dysfunction, hypothyroid, hyperparathyroidism, depression, pulmonary HTN presents to ED on 1/20 with progressively worsening SOB and worsening weeping edema a/w acute on chronic systolic CHF.

## 2024-01-21 NOTE — ED ADULT NURSE REASSESSMENT NOTE - NS ED NURSE REASSESS COMMENT FT1
patient raised voice to RN becoming agitated and demanding.  patient asked to lower voice so a conversation could be had at 1243 am.

## 2024-01-21 NOTE — PROGRESS NOTE ADULT - PROBLEM SELECTOR PLAN 2
Pt with progressively worsening b/l LE leg wounds weeping serosanguinous fluid, L>R. b/l LE with extensive pitting edema from foot to hip  - minimal erythema, serosanguinous fluid, afebrile, normal white count. given pmh DM, CHF, and chronicity of edema more likely non-healing wounds vs low risk for cellulitis  - empirically given clindamycin in ED, may continue for now  - f/u BCx  - f/u wound culture  - ID consulted (Dr Driscoll) Pt with progressively worsening b/l LE leg wounds weeping serosanguinous fluid, L>R. b/l LE with extensive pitting edema from foot to hip in the setting of volume overload and possible chronic venous insufficiency.   - Minimal erythema, serosanguinous fluid.    - Afebrile, normal white count. No concerns for infection at this time.   - Hold off Abx and monitor clinical course, temp and WBC.   - f/u BCx  - f/u wound culture  - ID consulted (Dr Driscoll)  - PT/OT eval rec DAYNA. - Pt with progressively worsening b/l LE leg wounds weeping serosanguinous fluid, L>R. b/l LE with extensive pitting edema from foot to hip in the setting of volume overload and likely chronic venous insufficiency.   - Minimal erythema, serosanguinous fluid.    - Afebrile, normal white count. Doubt cellulitis at this time.   - Hold off Abx and monitor clinical course, temp and WBC.   - f/u BCx  - f/u wound culture  - ID consulted (Dr Driscoll), recs appreciated  - PT/OT eval rec DAYNA.

## 2024-01-21 NOTE — OCCUPATIONAL THERAPY INITIAL EVALUATION ADULT - DIAGNOSIS, OT EVAL
Pt with poor endurance, decreased strength in BLE, and generalized weakness impacting ability to complete ADLs, IADLs, functional mobility/transfers .

## 2024-01-21 NOTE — PROGRESS NOTE ADULT - PROBLEM SELECTOR PLAN 4
atrial fibrillation on xarelto, atrial flutter s/p ablation, bradycardia and syncope s/p ppm implant,   - continue home xarelto  - remote tele - Atrial fibrillation on xarelto, atrial flutter s/p ablation, bradycardia and syncope s/p ppm implant.  - EKG: Line A-paced, wide QRS rhythm, RBBB, Left anterior fascicular block, LV hypertrophy with repolarization abnormality    - continue home xarelto  - Continue Coreg  - remote tele

## 2024-01-21 NOTE — ED ADULT NURSE REASSESSMENT NOTE - NS ED NURSE REASSESS COMMENT FT1
Pt sitting up in chair, bedside monitor intact, admit provider informed of pt refusing afternoon insulin, education provided to patient pt still noted to have refused medication, pt also previously denied bp medication per pt noted to be hypertensive, pt provided education on vitals pt notes to agree to take medication, Irma bedside assessing pt, bedside monitor intact will continue to monitor pt, Heron CABA

## 2024-01-21 NOTE — OCCUPATIONAL THERAPY INITIAL EVALUATION ADULT - ADDITIONAL COMMENTS
Pt reports she lives in a private home alone with no family who lives nearby. [facility] 3 1/2 JULIO C plus 13 stairs inside to go upstairs to bedroom and 13 stairs downstairs where laundry is located. Pt has a tub bathroom with a shower chair and grab bars. Pt reports she has been sleeping in a chair as she needs a new bed. Pt reports PTA she was independent with all ADLs however recently 2* pain and BLE weakness she has been having more difficulty with her ADLs. Pt reports she was ambulating with use of a rollator and RW occasionally.

## 2024-01-21 NOTE — OCCUPATIONAL THERAPY INITIAL EVALUATION ADULT - ADL RETRAINING, OT EVAL
Pt will complete LB dressing with MinAx1, with AE as needed, by 2-5 sessions. Pt will complete grooming activity while standing at the sink with supervision by 2-5 sessions.

## 2024-01-21 NOTE — PHYSICAL THERAPY INITIAL EVALUATION ADULT - ADDITIONAL COMMENTS
Pt lives alone in house, 3 steps to enter with HR then 1 FOS (13) steps to bedroom. Pt utilizes RW/rollator.

## 2024-01-21 NOTE — PROGRESS NOTE ADULT - PROBLEM SELECTOR PLAN 1
TTE 2/9/22 LVEF 40-45%, LA 5.3cm, mild con LVH, mod inferior and posterior hypokinesis, mod MR, trace AI, mild PI, mod TR, RVSP 76   - increased SOB with minimal activity concern for CHF exacerbation and fluid overload  - gave lasix 40mg IVP in ED  - continue lasix 40mg IVP BID  - f/u TTE  - continue coreg and dogoxin  - f/u AM digoxin level  - cardio consulted (Dr Drake), appreciate recs - Pt with progressive LE edema for the past 3 weeks to groin level and increased SOB with minimal activity. Seen by her cardio outpatient about 2 weeks ago and started Lasix 20mg qd but pt was able to start it about 1 week ago with no significant improvement.  - Volumen overloaded on exam. Normal O2 sats on RA  - CXR: Mild CHF at this time  - pBNP 1029 in the setting of obesity.   - TTE 2/9/22 LVEF 40-45%, mild con LVH, mod inferior and posterior hypokinesis, mod MR, trace AI, mild PI, mod TR, RVSP 76.   - Continue lasix 40mg IVP BID. Monitor I&Os  - f/u TTE  - Continue coreg and digoxin. Digoxin level within normal range.   - cardio consulted (Dr Drake), appreciate recs - acute on chronic systolic CHF  - Pt with progressive LE edema for the past 3 weeks to groin level and increased SOB with minimal activity. Seen by her cardio outpatient about 2 weeks ago and started Lasix 20mg qd but pt was able to start it about 1 week ago with no significant improvement.  - severely volume overloaded (especially peripherally) on exam. Normal O2 sats on RA  - CXR: Mild CHF at this time  - pBNP 1029 in the setting of obesity.   - TTE 2/9/22 LVEF 40-45%, mild con LVH, mod inferior and posterior hypokinesis, mod MR, trace AI, mild PI, mod TR, RVSP 76.   - Continue lasix 40mg IVP BID. Monitor I&Os  - f/u TTE  - Continue coreg and digoxin. Digoxin level within normal range.   - cardio consulted (Dr. Drake group), appreciate recs

## 2024-01-21 NOTE — PROGRESS NOTE ADULT - SUBJECTIVE AND OBJECTIVE BOX
[facility] -- Josue Moore Pannella, Patel, Savella Goodger, Cohen  Office # [phone]      Follow Up:    LE edema   Subjective/Observations:   Seen bedside, sitting in chair on room air   complaints of feeling cold and insomnia   denies chest pain dizziness palpitations     REVIEW OF SYSTEMS: All other review of systems is negative unless indicated above    PAST MEDICAL & SURGICAL HISTORY:  Hypothyroid      DM (diabetes mellitus)      HTN (hypertension)      Atrial fibrillation and flutter  s/p ablation      Bradycardia  s/p MDT PPM      H/O pulmonary hypertension      H/O hyperparathyroidism      H/O cardiomyopathy      Cardiac pacemaker      H/O cardiac radiofrequency ablation      H/O carpal tunnel repair      History of parathyroid surgery          MEDICATIONS  (STANDING):  carvedilol 12.5 milliGRAM(s) Oral every 12 hours  dextrose 5%. 1000 milliLiter(s) (50 mL/Hr) IV Continuous <Continuous>  dextrose 5%. 1000 milliLiter(s) (50 mL/Hr) IV Continuous <Continuous>  dextrose 5%. 1000 milliLiter(s) (100 mL/Hr) IV Continuous <Continuous>  dextrose 5%. 1000 milliLiter(s) (50 mL/Hr) IV Continuous <Continuous>  dextrose 5%. 1000 milliLiter(s) (100 mL/Hr) IV Continuous <Continuous>  dextrose 50% Injectable 25 Gram(s) IV Push once  dextrose 50% Injectable 12.5 Gram(s) IV Push once  dextrose 50% Injectable 25 Gram(s) IV Push once  dextrose 50% Injectable 25 Gram(s) IV Push once  dextrose 50% Injectable 12.5 Gram(s) IV Push once  digoxin     Tablet 250 MICROGram(s) Oral daily  furosemide   Injectable 40 milliGRAM(s) IV Push two times a day  glucagon  Injectable 1 milliGRAM(s) IntraMuscular once  glucagon  Injectable 1 milliGRAM(s) IntraMuscular once  insulin lispro (ADMELOG) corrective regimen sliding scale   SubCutaneous three times a day before meals  insulin lispro (ADMELOG) corrective regimen sliding scale   SubCutaneous at bedtime  insulin regular (concentrated) human recombinant U-500 50 Unit(s) SubCutaneous two times a day with meals  levothyroxine 125 MICROGram(s) Oral daily  rivaroxaban 20 milliGRAM(s) Oral with dinner    MEDICATIONS  (PRN):  acetaminophen     Tablet .. 650 milliGRAM(s) Oral every 6 hours PRN Mild Pain (1 - 3)  dextrose Oral Gel 15 Gram(s) Oral once PRN Blood Glucose LESS THAN 70 milliGRAM(s)/deciliter  zolpidem 5 milliGRAM(s) Oral at bedtime PRN Insomnia  zolpidem 5 milliGRAM(s) Oral at bedtime PRN Insomnia      Allergies    penicillin (Short breath; Rash)  statins (Vomiting)  vancomycin (Short breath; Rash)  erythromycin (Rash)    Intolerances        Vital Signs Last 24 Hrs  T(C): 36.4 (2024 01:20), Max: 36.8 (2024 12:24)  T(F): 97.5 (2024 01:20), Max: 98.3 (2024 12:24)  HR: 61 (:20) (61 - 68)  BP: 109/63 (2024 01:20) (109/63 - 182/92)  BP(mean): --  RR: 18 (2024 01:20) (16 - 18)  SpO2: 100% (:20) (97% - 100%)    Parameters below as of 2024 01:20  Patient On (Oxygen Delivery Method): room air        I&O's Summary    Weight (kg): 100.2 ( @ 12:24)    PHYSICAL EXAM:  Constitutional: NAD, awake and alert, well-developed  HEENT: Moist Mucous Membranes, Anicteric  Pulmonary: Non-labored, breath sounds are diminished   Cardiovascular: Regular, S1 and S2 nl, No murmurs, rubs, gallops or clicks  Gastrointestinal: Bowel Sounds present, soft, nontender.   Lymph:+ pitting peripheral edema.  Skin: No visible rashes or ulcers.  Psych:  Mood & affect appropriate    LABS: All Labs Reviewed:                        13.4   5.24  )-----------( 154      ( 2024 06:28 )             42.7                         13.1   5.55  )-----------( 157      ( 2024 13:30 )             41.7     2024 06:28    140    |  101    |  17     ----------------------------<  304    4.4     |  34     |  0.83   2024 13:30    140    |  104    |  16     ----------------------------<  141    4.0     |  32     |  0.69     Ca    9.3        2024 06:28  Ca    8.9        2024 13:30    TPro  7.5    /  Alb  3.4    /  TBili  1.0    /  DBili  x      /  AST  26     /  ALT  19     /  AlkPhos  114    2024 06:28  TPro  7.1    /  Alb  3.1    /  TBili  0.9    /  DBili  x      /  AST  27     /  ALT  19     /  AlkPhos  116    2024 13:30    PT/INR - ( 2024 13:30 )   PT: 20.5 sec;   INR: 1.78 ratio         PTT - ( 2024 13:30 )  PTT:37.5 sec         EC Lead ECG:   Ventricular Rate 61 BPM    QRS Duration 168 ms    Q-T Interval 456 ms    QTC Calculation(Bazett) 459 ms    R Axis -54 degrees    T Axis 111 degrees    Diagnosis Line A-paced  Wide QRS rhythm  Right bundle branch block  Left anterior fascicular block  *** Bifascicular block ***  Left ventricular hypertrophy with repolarization abnormality ( R in aVL )  Abnormal ECG  No previous ECGs available  Confirmed by Shahrzad Drake ([id number]) on 2024 2:51:23 PM (24 @ 13:08)        Radiology:

## 2024-01-21 NOTE — PROGRESS NOTE ADULT - PROBLEM SELECTOR PLAN 5
DM on humalong 90U AM and 90U PM at home  - start on 30U BID  - REBEKAH Chronic.  A1c 8.8 on admission.   - Pt at home on Humulin 500, with 80 U AM and 80U PM.   - Pt wants to continue her home insulin. Continue 50U am and pm.   - MDISS  - Diabetic diet, Accucheck and hypoglycemia protocol.   - Diabetic consult  - Endo Dr. Perlman Consult. Chronic.  A1c 8.8%on admission.   - Pt at home on Humulin R U-500, with 80 U AM and 80U PM.   - will c/w pt's home insulin that she brought from home, but at lower dose for now -- Continue 50U AM and PM for now as pt states she is eating less in the hospital  - REBEKAH  - Diabetic diet, Accucheck and hypoglycemia protocol.   - Diabetes NP consult  - Endo Dr. Perlman Consult

## 2024-01-21 NOTE — PROGRESS NOTE ADULT - SUBJECTIVE AND OBJECTIVE BOX
Patient is a 73y old  Female who presents with a chief complaint of b/l LE wounds (21 Jan 2024 08:36)    INTERVAL HPI/OVERNIGHT EVENTS:      MEDICATIONS  (STANDING):  carvedilol 12.5 milliGRAM(s) Oral every 12 hours  dextrose 5%. 1000 milliLiter(s) (50 mL/Hr) IV Continuous <Continuous>  dextrose 5%. 1000 milliLiter(s) (100 mL/Hr) IV Continuous <Continuous>  dextrose 5%. 1000 milliLiter(s) (50 mL/Hr) IV Continuous <Continuous>  dextrose 5%. 1000 milliLiter(s) (50 mL/Hr) IV Continuous <Continuous>  dextrose 5%. 1000 milliLiter(s) (100 mL/Hr) IV Continuous <Continuous>  dextrose 50% Injectable 25 Gram(s) IV Push once  dextrose 50% Injectable 12.5 Gram(s) IV Push once  dextrose 50% Injectable 12.5 Gram(s) IV Push once  dextrose 50% Injectable 25 Gram(s) IV Push once  dextrose 50% Injectable 25 Gram(s) IV Push once  digoxin     Tablet 250 MICROGram(s) Oral daily  furosemide   Injectable 40 milliGRAM(s) IV Push two times a day  glucagon  Injectable 1 milliGRAM(s) IntraMuscular once  glucagon  Injectable 1 milliGRAM(s) IntraMuscular once  insulin lispro (ADMELOG) corrective regimen sliding scale   SubCutaneous at bedtime  insulin lispro (ADMELOG) corrective regimen sliding scale   SubCutaneous three times a day before meals  insulin regular (concentrated) human recombinant U-500 50 Unit(s) SubCutaneous two times a day with meals  levothyroxine 125 MICROGram(s) Oral daily  losartan 25 milliGRAM(s) Oral daily  rivaroxaban 20 milliGRAM(s) Oral with dinner    MEDICATIONS  (PRN):  acetaminophen     Tablet .. 650 milliGRAM(s) Oral every 6 hours PRN Mild Pain (1 - 3)  dextrose Oral Gel 15 Gram(s) Oral once PRN Blood Glucose LESS THAN 70 milliGRAM(s)/deciliter  zolpidem 5 milliGRAM(s) Oral at bedtime PRN Insomnia  zolpidem 5 milliGRAM(s) Oral at bedtime PRN Insomnia      Allergies    penicillin (Short breath; Rash)  statins (Vomiting)  vancomycin (Short breath; Rash)  erythromycin (Rash)    Intolerances        REVIEW OF SYSTEMS:  CONSTITUTIONAL: No fever or chills  HEENT:  No headache, no sore throat  RESPIRATORY: No cough, wheezing, or shortness of breath  CARDIOVASCULAR: No chest pain, palpitations  GASTROINTESTINAL: No abd pain, nausea, vomiting, or diarrhea  GENITOURINARY: No dysuria, frequency, or hematuria  NEUROLOGICAL: no focal weakness or dizziness  MUSCULOSKELETAL: no myalgias   INTEGUMENTARY:     Vital Signs Last 24 Hrs  T(C): 36.9 (21 Jan 2024 12:39), Max: 36.9 (21 Jan 2024 12:39)  T(F): 98.5 (21 Jan 2024 12:39), Max: 98.5 (21 Jan 2024 12:39)  HR: 61 (21 Jan 2024 15:08) (61 - 68)  BP: 182/79 (21 Jan 2024 15:08) (109/63 - 193/68)  BP(mean): --  RR: 18 (21 Jan 2024 12:39) (16 - 18)  SpO2: 98% (21 Jan 2024 15:08) (97% - 100%)    Parameters below as of 21 Jan 2024 15:08  Patient On (Oxygen Delivery Method): room air        PHYSICAL EXAM:  GENERAL: NAD  HEENT:  anicteric, moist mucous membranes  CHEST/LUNG:  CTA b/l, no rales, wheezes, or rhonchi  HEART:  RRR, S1, S2  ABDOMEN:  BS+, soft, nontender, nondistended  MUSCULOSKELETAL: no edema, cyanosis, or calf tenderness  NEUROLOGIC: answers questions and follows commands appropriately      LABS:                        13.4   5.24  )-----------( 154      ( 21 Jan 2024 06:28 )             42.7     CBC Full  -  ( 21 Jan 2024 06:28 )  WBC Count : 5.24 K/uL  Hemoglobin : 13.4 g/dL  Hematocrit : 42.7 %  Platelet Count - Automated : 154 K/uL  Mean Cell Volume : 92.0 fl  Mean Cell Hemoglobin : 28.9 pg  Mean Cell Hemoglobin Concentration : 31.4 gm/dL  Auto Neutrophil # : 3.02 K/uL  Auto Lymphocyte # : 1.48 K/uL  Auto Monocyte # : 0.49 K/uL  Auto Eosinophil # : 0.19 K/uL  Auto Basophil # : 0.05 K/uL  Auto Neutrophil % : 57.6 %  Auto Lymphocyte % : 28.2 %  Auto Monocyte % : 9.4 %  Auto Eosinophil % : 3.6 %  Auto Basophil % : 1.0 %    21 Jan 2024 06:28    140    |  101    |  17     ----------------------------<  304    4.4     |  34     |  0.83     Ca    9.3        21 Jan 2024 06:28    TPro  7.5    /  Alb  3.4    /  TBili  1.0    /  DBili  x      /  AST  26     /  ALT  19     /  AlkPhos  114    21 Jan 2024 06:28    PT/INR - ( 20 Jan 2024 13:30 )   PT: 20.5 sec;   INR: 1.78 ratio         PTT - ( 20 Jan 2024 13:30 )  PTT:37.5 sec  Urinalysis Basic - ( 21 Jan 2024 06:28 )    Color: x / Appearance: x / SG: x / pH: x  Gluc: 304 mg/dL / Ketone: x  / Bili: x / Urobili: x   Blood: x / Protein: x / Nitrite: x   Leuk Esterase: x / RBC: x / WBC x   Sq Epi: x / Non Sq Epi: x / Bacteria: x      CAPILLARY BLOOD GLUCOSE      POCT Blood Glucose.: 248 mg/dL (21 Jan 2024 16:18)  POCT Blood Glucose.: 252 mg/dL (21 Jan 2024 13:49)  POCT Blood Glucose.: 271 mg/dL (21 Jan 2024 12:11)  POCT Blood Glucose.: 317 mg/dL (21 Jan 2024 09:34)  POCT Blood Glucose.: 224 mg/dL (20 Jan 2024 21:06)  POCT Blood Glucose.: 173 mg/dL (20 Jan 2024 19:21)          RADIOLOGY & ADDITIONAL TESTS:    Personally reviewed.     Consultant(s) Notes Reviewed:  [x] YES  [ ] NO     Patient is a 73y old  Female who presents with a chief complaint of b/l LE wounds (21 Jan 2024 08:36)    INTERVAL HPI/OVERNIGHT EVENTS: pt seen and examined at bedside. Pt reports persistent LE edema in LE. She reports frequent diuresis as well.  Tolerating PO. Denies SOB at rest since in the hospital. No fevers. Pt upset due to many hours in the ED area, unable to sleep due to severe coldness.  Denies chest pain, palpitations, abdominal pain.       MEDICATIONS  (STANDING):  carvedilol 12.5 milliGRAM(s) Oral every 12 hours  dextrose 5%. 1000 milliLiter(s) (50 mL/Hr) IV Continuous <Continuous>  dextrose 5%. 1000 milliLiter(s) (100 mL/Hr) IV Continuous <Continuous>  dextrose 5%. 1000 milliLiter(s) (50 mL/Hr) IV Continuous <Continuous>  dextrose 5%. 1000 milliLiter(s) (50 mL/Hr) IV Continuous <Continuous>  dextrose 5%. 1000 milliLiter(s) (100 mL/Hr) IV Continuous <Continuous>  dextrose 50% Injectable 25 Gram(s) IV Push once  dextrose 50% Injectable 12.5 Gram(s) IV Push once  dextrose 50% Injectable 12.5 Gram(s) IV Push once  dextrose 50% Injectable 25 Gram(s) IV Push once  dextrose 50% Injectable 25 Gram(s) IV Push once  digoxin     Tablet 250 MICROGram(s) Oral daily  furosemide   Injectable 40 milliGRAM(s) IV Push two times a day  glucagon  Injectable 1 milliGRAM(s) IntraMuscular once  glucagon  Injectable 1 milliGRAM(s) IntraMuscular once  insulin lispro (ADMELOG) corrective regimen sliding scale   SubCutaneous at bedtime  insulin lispro (ADMELOG) corrective regimen sliding scale   SubCutaneous three times a day before meals  insulin regular (concentrated) human recombinant U-500 50 Unit(s) SubCutaneous two times a day with meals  levothyroxine 125 MICROGram(s) Oral daily  losartan 25 milliGRAM(s) Oral daily  rivaroxaban 20 milliGRAM(s) Oral with dinner    MEDICATIONS  (PRN):  acetaminophen     Tablet .. 650 milliGRAM(s) Oral every 6 hours PRN Mild Pain (1 - 3)  dextrose Oral Gel 15 Gram(s) Oral once PRN Blood Glucose LESS THAN 70 milliGRAM(s)/deciliter  zolpidem 5 milliGRAM(s) Oral at bedtime PRN Insomnia  zolpidem 5 milliGRAM(s) Oral at bedtime PRN Insomnia      Allergies  penicillin (Short breath; Rash)  statins (Vomiting)  vancomycin (Short breath; Rash)  erythromycin (Rash)      REVIEW OF SYSTEMS:  CONSTITUTIONAL: No fever or chills  HEENT:  No headache, no sore throat  RESPIRATORY: No cough, wheezing, or shortness of breath  CARDIOVASCULAR: No chest pain, palpitations  GASTROINTESTINAL: No abd pain, nausea, vomiting, or diarrhea  GENITOURINARY: No dysuria  NEUROLOGICAL: no focal weakness or dizziness  MUSCULOSKELETAL: no myalgias       Vital Signs Last 24 Hrs  T(C): 36.9 (21 Jan 2024 12:39), Max: 36.9 (21 Jan 2024 12:39)  T(F): 98.5 (21 Jan 2024 12:39), Max: 98.5 (21 Jan 2024 12:39)  HR: 61 (21 Jan 2024 15:08) (61 - 68)  BP: 182/79 (21 Jan 2024 15:08) (109/63 - 193/68)  RR: 18 (21 Jan 2024 12:39) (16 - 18)  SpO2: 98% (21 Jan 2024 15:08) (97% - 100%)    Parameters below as of 21 Jan 2024 15:08  Patient On (Oxygen Delivery Method): room air    PHYSICAL EXAM:  GENERAL: NAD, on RA breathing well and normal O2 sat.   HEENT: moist mucous membranes  CHEST/LUNG: Decreased respiratory sounds on bases with clear sounds b/l, no rales, wheezes, or rhonchi  HEART:  RRR, S1,S2.  + murmur   ABDOMEN:  BS+, soft, nontender, nondistended  MUSCULOSKELETAL: LE pitting edema up to thigh level, mild distal regular erythema, no cyanosis, or calf tenderness. No active discharge from small blisters mainly in distal left LE.   NEUROLOGIC: answers questions and follows commands appropriately      LABS:                        13.4   5.24  )-----------( 154      ( 21 Jan 2024 06:28 )             42.7     CBC Full  -  ( 21 Jan 2024 06:28 )  WBC Count : 5.24 K/uL  Hemoglobin : 13.4 g/dL  Hematocrit : 42.7 %  Platelet Count - Automated : 154 K/uL  Mean Cell Volume : 92.0 fl  Mean Cell Hemoglobin : 28.9 pg  Mean Cell Hemoglobin Concentration : 31.4 gm/dL  Auto Neutrophil # : 3.02 K/uL  Auto Lymphocyte # : 1.48 K/uL  Auto Monocyte # : 0.49 K/uL  Auto Eosinophil # : 0.19 K/uL  Auto Basophil # : 0.05 K/uL  Auto Neutrophil % : 57.6 %  Auto Lymphocyte % : 28.2 %  Auto Monocyte % : 9.4 %  Auto Eosinophil % : 3.6 %  Auto Basophil % : 1.0 %      21 Jan 2024 06:28    140    |  101    |  17     ----------------------------<  304    4.4     |  34     |  0.83     Ca    9.3        21 Jan 2024 06:28    TPro  7.5    /  Alb  3.4    /  TBili  1.0    /  DBili  x      /  AST  26     /  ALT  19     /  AlkPhos  114    21 Jan 2024 06:28    PT/INR - ( 20 Jan 2024 13:30 )   PT: 20.5 sec;   INR: 1.78 ratio       PTT - ( 20 Jan 2024 13:30 )  PTT:37.5 sec    Urinalysis Basic - ( 21 Jan 2024 06:28 )  Color: x / Appearance: x / SG: x / pH: x  Gluc: 304 mg/dL / Ketone: x  / Bili: x / Urobili: x   Blood: x / Protein: x / Nitrite: x   Leuk Esterase: x / RBC: x / WBC x   Sq Epi: x / Non Sq Epi: x / Bacteria: x      CAPILLARY BLOOD GLUCOSE  POCT Blood Glucose.: 248 mg/dL (21 Jan 2024 16:18)  POCT Blood Glucose.: 252 mg/dL (21 Jan 2024 13:49)  POCT Blood Glucose.: 271 mg/dL (21 Jan 2024 12:11)  POCT Blood Glucose.: 317 mg/dL (21 Jan 2024 09:34)  POCT Blood Glucose.: 224 mg/dL (20 Jan 2024 21:06)  POCT Blood Glucose.: 173 mg/dL (20 Jan 2024 19:21)        Personally reviewed.   Consultant(s) Notes Reviewed:  [x] YES  [ ] NO     Patient is a 73y old  Female who presents with a chief complaint of worsening LE edema with weeping edema.    INTERVAL HPI/OVERNIGHT EVENTS: pt seen and examined at bedside. Pt reports persistent LE edema in LE. She reports frequent diuresis as well.  Tolerating PO. Denies SOB at rest since in the hospital. No fevers. Pt upset due to many hours in the ED area, unable to sleep due to severe coldness.  Denies chest pain, palpitations, abdominal pain.       MEDICATIONS  (STANDING):  carvedilol 12.5 milliGRAM(s) Oral every 12 hours  dextrose 5%. 1000 milliLiter(s) (50 mL/Hr) IV Continuous <Continuous>  dextrose 5%. 1000 milliLiter(s) (100 mL/Hr) IV Continuous <Continuous>  dextrose 5%. 1000 milliLiter(s) (50 mL/Hr) IV Continuous <Continuous>  dextrose 5%. 1000 milliLiter(s) (50 mL/Hr) IV Continuous <Continuous>  dextrose 5%. 1000 milliLiter(s) (100 mL/Hr) IV Continuous <Continuous>  dextrose 50% Injectable 25 Gram(s) IV Push once  dextrose 50% Injectable 12.5 Gram(s) IV Push once  dextrose 50% Injectable 12.5 Gram(s) IV Push once  dextrose 50% Injectable 25 Gram(s) IV Push once  dextrose 50% Injectable 25 Gram(s) IV Push once  digoxin     Tablet 250 MICROGram(s) Oral daily  furosemide   Injectable 40 milliGRAM(s) IV Push two times a day  glucagon  Injectable 1 milliGRAM(s) IntraMuscular once  glucagon  Injectable 1 milliGRAM(s) IntraMuscular once  insulin lispro (ADMELOG) corrective regimen sliding scale   SubCutaneous at bedtime  insulin lispro (ADMELOG) corrective regimen sliding scale   SubCutaneous three times a day before meals  insulin regular (concentrated) human recombinant U-500 50 Unit(s) SubCutaneous two times a day with meals  levothyroxine 125 MICROGram(s) Oral daily  losartan 25 milliGRAM(s) Oral daily  rivaroxaban 20 milliGRAM(s) Oral with dinner    MEDICATIONS  (PRN):  acetaminophen     Tablet .. 650 milliGRAM(s) Oral every 6 hours PRN Mild Pain (1 - 3)  dextrose Oral Gel 15 Gram(s) Oral once PRN Blood Glucose LESS THAN 70 milliGRAM(s)/deciliter  zolpidem 5 milliGRAM(s) Oral at bedtime PRN Insomnia  zolpidem 5 milliGRAM(s) Oral at bedtime PRN Insomnia      Allergies  penicillin (Short breath; Rash)  statins (Vomiting)  vancomycin (Short breath; Rash)  erythromycin (Rash)      REVIEW OF SYSTEMS:  CONSTITUTIONAL: No fever or chills  HEENT:  No headache, no sore throat  RESPIRATORY: No cough, wheezing, or shortness of breath  CARDIOVASCULAR: No chest pain, palpitations  GASTROINTESTINAL: No abd pain, nausea, vomiting, or diarrhea  GENITOURINARY: No dysuria  NEUROLOGICAL: no focal weakness or dizziness  MUSCULOSKELETAL: no myalgias       Vital Signs Last 24 Hrs  T(C): 36.9 (21 Jan 2024 12:39), Max: 36.9 (21 Jan 2024 12:39)  T(F): 98.5 (21 Jan 2024 12:39), Max: 98.5 (21 Jan 2024 12:39)  HR: 61 (21 Jan 2024 15:08) (61 - 68)  BP: 182/79 (21 Jan 2024 15:08) (109/63 - 193/68)  RR: 18 (21 Jan 2024 12:39) (16 - 18)  SpO2: 98% (21 Jan 2024 15:08) (97% - 100%)    Parameters below as of 21 Jan 2024 15:08  Patient On (Oxygen Delivery Method): room air    PHYSICAL EXAM:  GENERAL: NAD, on RA breathing well and normal O2 sat.   HEENT: moist mucous membranes  CHEST/LUNG: Decreased respiratory sounds on bases with clear sounds b/l, no rales, wheezes, or rhonchi  HEART:  RRR, S1,S2.  + murmur   ABDOMEN:  BS+, soft, nontender, nondistended  MUSCULOSKELETAL: LE pitting edema up to thigh level, mild distal regular erythema, no cyanosis, or calf tenderness. No active discharge from small blisters mainly in distal left LE.   NEUROLOGIC: answers questions and follows commands appropriately      LABS:                        13.4   5.24  )-----------( 154      ( 21 Jan 2024 06:28 )             42.7     CBC Full  -  ( 21 Jan 2024 06:28 )  WBC Count : 5.24 K/uL  Hemoglobin : 13.4 g/dL  Hematocrit : 42.7 %  Platelet Count - Automated : 154 K/uL  Mean Cell Volume : 92.0 fl  Mean Cell Hemoglobin : 28.9 pg  Mean Cell Hemoglobin Concentration : 31.4 gm/dL  Auto Neutrophil # : 3.02 K/uL  Auto Lymphocyte # : 1.48 K/uL  Auto Monocyte # : 0.49 K/uL  Auto Eosinophil # : 0.19 K/uL  Auto Basophil # : 0.05 K/uL  Auto Neutrophil % : 57.6 %  Auto Lymphocyte % : 28.2 %  Auto Monocyte % : 9.4 %  Auto Eosinophil % : 3.6 %  Auto Basophil % : 1.0 %      21 Jan 2024 06:28    140    |  101    |  17     ----------------------------<  304    4.4     |  34     |  0.83     Ca    9.3        21 Jan 2024 06:28    TPro  7.5    /  Alb  3.4    /  TBili  1.0    /  DBili  x      /  AST  26     /  ALT  19     /  AlkPhos  114    21 Jan 2024 06:28    PT/INR - ( 20 Jan 2024 13:30 )   PT: 20.5 sec;   INR: 1.78 ratio       PTT - ( 20 Jan 2024 13:30 )  PTT:37.5 sec    Urinalysis Basic - ( 21 Jan 2024 06:28 )  Color: x / Appearance: x / SG: x / pH: x  Gluc: 304 mg/dL / Ketone: x  / Bili: x / Urobili: x   Blood: x / Protein: x / Nitrite: x   Leuk Esterase: x / RBC: x / WBC x   Sq Epi: x / Non Sq Epi: x / Bacteria: x      CAPILLARY BLOOD GLUCOSE  POCT Blood Glucose.: 248 mg/dL (21 Jan 2024 16:18)  POCT Blood Glucose.: 252 mg/dL (21 Jan 2024 13:49)  POCT Blood Glucose.: 271 mg/dL (21 Jan 2024 12:11)  POCT Blood Glucose.: 317 mg/dL (21 Jan 2024 09:34)  POCT Blood Glucose.: 224 mg/dL (20 Jan 2024 21:06)  POCT Blood Glucose.: 173 mg/dL (20 Jan 2024 19:21)        Personally reviewed.   Consultant(s) Notes Reviewed:  [x] YES  [ ] NO     Patient is a 73y old  Female who presents with a chief complaint of worsening LE edema with weeping edema and worsening SOB.    INTERVAL HPI/OVERNIGHT EVENTS: Pt seen and examined at bedside. Pt reports persistent LE edema. She reports frequent urination with lasix. Denies SOB at rest since in the hospital but still with SALCIDO. No fevers. Pt upset due to many hours in the ED area, unable to sleep due to discomfort of ER setting. Denies chest pain, palpitations, abdominal pain.       MEDICATIONS  (STANDING):  carvedilol 12.5 milliGRAM(s) Oral every 12 hours  dextrose 5%. 1000 milliLiter(s) (50 mL/Hr) IV Continuous <Continuous>  dextrose 5%. 1000 milliLiter(s) (100 mL/Hr) IV Continuous <Continuous>  dextrose 5%. 1000 milliLiter(s) (50 mL/Hr) IV Continuous <Continuous>  dextrose 5%. 1000 milliLiter(s) (50 mL/Hr) IV Continuous <Continuous>  dextrose 5%. 1000 milliLiter(s) (100 mL/Hr) IV Continuous <Continuous>  dextrose 50% Injectable 25 Gram(s) IV Push once  dextrose 50% Injectable 12.5 Gram(s) IV Push once  dextrose 50% Injectable 12.5 Gram(s) IV Push once  dextrose 50% Injectable 25 Gram(s) IV Push once  dextrose 50% Injectable 25 Gram(s) IV Push once  digoxin     Tablet 250 MICROGram(s) Oral daily  furosemide   Injectable 40 milliGRAM(s) IV Push two times a day  glucagon  Injectable 1 milliGRAM(s) IntraMuscular once  glucagon  Injectable 1 milliGRAM(s) IntraMuscular once  insulin lispro (ADMELOG) corrective regimen sliding scale   SubCutaneous at bedtime  insulin lispro (ADMELOG) corrective regimen sliding scale   SubCutaneous three times a day before meals  insulin regular (concentrated) human recombinant U-500 50 Unit(s) SubCutaneous two times a day with meals  levothyroxine 125 MICROGram(s) Oral daily  losartan 25 milliGRAM(s) Oral daily  rivaroxaban 20 milliGRAM(s) Oral with dinner    MEDICATIONS  (PRN):  acetaminophen     Tablet .. 650 milliGRAM(s) Oral every 6 hours PRN Mild Pain (1 - 3)  dextrose Oral Gel 15 Gram(s) Oral once PRN Blood Glucose LESS THAN 70 milliGRAM(s)/deciliter  zolpidem 5 milliGRAM(s) Oral at bedtime PRN Insomnia  zolpidem 5 milliGRAM(s) Oral at bedtime PRN Insomnia      Allergies  penicillin (Short breath; Rash)  statins (Vomiting)  vancomycin (Short breath; Rash)  erythromycin (Rash)      REVIEW OF SYSTEMS:  CONSTITUTIONAL: No fever or chills  HEENT:  No headache, no sore throat  RESPIRATORY: No cough, wheezing, or shortness of breath at rest ; +SALCIDO  CARDIOVASCULAR: No chest pain, palpitations  GASTROINTESTINAL: No abd pain, nausea, vomiting, or diarrhea  GENITOURINARY: No dysuria  NEUROLOGICAL: no focal weakness or dizziness  MUSCULOSKELETAL: leg swelling with weeping edema; no myalgias       Vital Signs Last 24 Hrs  T(C): 36.9 (21 Jan 2024 12:39), Max: 36.9 (21 Jan 2024 12:39)  T(F): 98.5 (21 Jan 2024 12:39), Max: 98.5 (21 Jan 2024 12:39)  HR: 61 (21 Jan 2024 15:08) (61 - 68)  BP: 182/79 (21 Jan 2024 15:08) (109/63 - 193/68)  RR: 18 (21 Jan 2024 12:39) (16 - 18)  SpO2: 98% (21 Jan 2024 15:08) (97% - 100%)    Parameters below as of 21 Jan 2024 15:08  Patient On (Oxygen Delivery Method): room air    PHYSICAL EXAM:  GENERAL: NAD, on RA breathing well and normal O2 sat while sitting up  HEENT: moist mucous membranes, anicteric  CHEST/LUNG: Decreased respiratory sounds in bases with clear sounds b/l upper lung fields, no rales, wheezes, or rhonchi appreciated  HEART:  RRR, S1, S2, +murmur   ABDOMEN:  BS+, soft, nontender, nondistended  MUSCULOSKELETAL: severe LE pitting edema b/l with blistering/weeping edema on left LE, no cyanosis, or calf tenderness.  NEUROLOGIC: answers questions and follows commands appropriately      LABS:                        13.4   5.24  )-----------( 154      ( 21 Jan 2024 06:28 )             42.7     CBC Full  -  ( 21 Jan 2024 06:28 )  WBC Count : 5.24 K/uL  Hemoglobin : 13.4 g/dL  Hematocrit : 42.7 %  Platelet Count - Automated : 154 K/uL  Mean Cell Volume : 92.0 fl  Mean Cell Hemoglobin : 28.9 pg  Mean Cell Hemoglobin Concentration : 31.4 gm/dL  Auto Neutrophil # : 3.02 K/uL  Auto Lymphocyte # : 1.48 K/uL  Auto Monocyte # : 0.49 K/uL  Auto Eosinophil # : 0.19 K/uL  Auto Basophil # : 0.05 K/uL  Auto Neutrophil % : 57.6 %  Auto Lymphocyte % : 28.2 %  Auto Monocyte % : 9.4 %  Auto Eosinophil % : 3.6 %  Auto Basophil % : 1.0 %      21 Jan 2024 06:28    140    |  101    |  17     ----------------------------<  304    4.4     |  34     |  0.83     Ca    9.3        21 Jan 2024 06:28    TPro  7.5    /  Alb  3.4    /  TBili  1.0    /  DBili  x      /  AST  26     /  ALT  19     /  AlkPhos  114    21 Jan 2024 06:28    PT/INR - ( 20 Jan 2024 13:30 )   PT: 20.5 sec;   INR: 1.78 ratio       PTT - ( 20 Jan 2024 13:30 )  PTT:37.5 sec    Urinalysis Basic - ( 21 Jan 2024 06:28 )  Color: x / Appearance: x / SG: x / pH: x  Gluc: 304 mg/dL / Ketone: x  / Bili: x / Urobili: x   Blood: x / Protein: x / Nitrite: x   Leuk Esterase: x / RBC: x / WBC x   Sq Epi: x / Non Sq Epi: x / Bacteria: x      CAPILLARY BLOOD GLUCOSE  POCT Blood Glucose.: 248 mg/dL (21 Jan 2024 16:18)  POCT Blood Glucose.: 252 mg/dL (21 Jan 2024 13:49)  POCT Blood Glucose.: 271 mg/dL (21 Jan 2024 12:11)  POCT Blood Glucose.: 317 mg/dL (21 Jan 2024 09:34)  POCT Blood Glucose.: 224 mg/dL (20 Jan 2024 21:06)  POCT Blood Glucose.: 173 mg/dL (20 Jan 2024 19:21)        Personally reviewed.   Consultant(s) Notes Reviewed:  [x] YES  [ ] NO

## 2024-01-21 NOTE — PROGRESS NOTE ADULT - ASSESSMENT
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 Cardiology called for SOB. Dr. Ellis: Cardio.     A fib/Flutter, HTN, Cardiomyopathy, mod LVSD, Pulm HTN, PPM  - She saw her cardiologist and was started on lasix 20 mg PO daily last week.   - Known CHF w/ mod LVSD and pulm HTN   - Echo: TTE 2/9/22 LVEF 40-45%, LA 5.3cm, mild con LVH, mod inferior and posterior hypokinesis, mod MR, trace AI, mild PI, mod TR, RVSP 76   - Please obtain transthoracic echocardiogram to assess ventricular function, wall motion and valvular abnormalities.   - BNP 1029  - CXR with diffuse mild congestive changes  - continue  Lasix 40 mg IV BID   - Monitor strict i/o and daily weight   -LE doppler negative for DVT     - known A fib  - Continue Coreg and digoxin  - Continue Xarelto    - EKG showed A paced rhythm/ Aflib @ 61  - Troponin HsI 50.7  - No evidence of any active ischemia     - Monitor and replete lytes, keep K>4, Mg>2.  - Will continue to follow.

## 2024-01-22 DIAGNOSIS — E11.9 TYPE 2 DIABETES MELLITUS WITHOUT COMPLICATIONS: ICD-10-CM

## 2024-01-22 DIAGNOSIS — I50.23 ACUTE ON CHRONIC SYSTOLIC (CONGESTIVE) HEART FAILURE: ICD-10-CM

## 2024-01-22 LAB
ANION GAP SERPL CALC-SCNC: 3 MMOL/L — LOW (ref 5–17)
ANION GAP SERPL CALC-SCNC: 5 MMOL/L — SIGNIFICANT CHANGE UP (ref 5–17)
BUN SERPL-MCNC: 16 MG/DL — SIGNIFICANT CHANGE UP (ref 7–23)
BUN SERPL-MCNC: 19 MG/DL — SIGNIFICANT CHANGE UP (ref 7–23)
CALCIUM SERPL-MCNC: 9 MG/DL — SIGNIFICANT CHANGE UP (ref 8.5–10.1)
CALCIUM SERPL-MCNC: 9.1 MG/DL — SIGNIFICANT CHANGE UP (ref 8.5–10.1)
CHLORIDE SERPL-SCNC: 100 MMOL/L — SIGNIFICANT CHANGE UP (ref 96–108)
CHLORIDE SERPL-SCNC: 102 MMOL/L — SIGNIFICANT CHANGE UP (ref 96–108)
CO2 SERPL-SCNC: 34 MMOL/L — HIGH (ref 22–31)
CO2 SERPL-SCNC: 36 MMOL/L — HIGH (ref 22–31)
CREAT SERPL-MCNC: 0.72 MG/DL — SIGNIFICANT CHANGE UP (ref 0.5–1.3)
CREAT SERPL-MCNC: 0.75 MG/DL — SIGNIFICANT CHANGE UP (ref 0.5–1.3)
EGFR: 83 ML/MIN/1.73M2 — SIGNIFICANT CHANGE UP
EGFR: 87 ML/MIN/1.73M2 — SIGNIFICANT CHANGE UP
GLUCOSE SERPL-MCNC: 114 MG/DL — HIGH (ref 70–99)
GLUCOSE SERPL-MCNC: 129 MG/DL — HIGH (ref 70–99)
HCT VFR BLD CALC: 42 % — SIGNIFICANT CHANGE UP (ref 34.5–45)
HGB BLD-MCNC: 13.2 G/DL — SIGNIFICANT CHANGE UP (ref 11.5–15.5)
MAGNESIUM SERPL-MCNC: 1.5 MG/DL — LOW (ref 1.6–2.6)
MCHC RBC-ENTMCNC: 29 PG — SIGNIFICANT CHANGE UP (ref 27–34)
MCHC RBC-ENTMCNC: 31.4 GM/DL — LOW (ref 32–36)
MCV RBC AUTO: 92.3 FL — SIGNIFICANT CHANGE UP (ref 80–100)
NRBC # BLD: 0 /100 WBCS — SIGNIFICANT CHANGE UP (ref 0–0)
PHOSPHATE SERPL-MCNC: 3.9 MG/DL — SIGNIFICANT CHANGE UP (ref 2.5–4.5)
PLATELET # BLD AUTO: 162 K/UL — SIGNIFICANT CHANGE UP (ref 150–400)
POTASSIUM SERPL-MCNC: 3.4 MMOL/L — LOW (ref 3.5–5.3)
POTASSIUM SERPL-MCNC: 3.6 MMOL/L — SIGNIFICANT CHANGE UP (ref 3.5–5.3)
POTASSIUM SERPL-SCNC: 3.4 MMOL/L — LOW (ref 3.5–5.3)
POTASSIUM SERPL-SCNC: 3.6 MMOL/L — SIGNIFICANT CHANGE UP (ref 3.5–5.3)
RBC # BLD: 4.55 M/UL — SIGNIFICANT CHANGE UP (ref 3.8–5.2)
RBC # FLD: 14.9 % — HIGH (ref 10.3–14.5)
SODIUM SERPL-SCNC: 139 MMOL/L — SIGNIFICANT CHANGE UP (ref 135–145)
SODIUM SERPL-SCNC: 141 MMOL/L — SIGNIFICANT CHANGE UP (ref 135–145)
WBC # BLD: 5.74 K/UL — SIGNIFICANT CHANGE UP (ref 3.8–10.5)
WBC # FLD AUTO: 5.74 K/UL — SIGNIFICANT CHANGE UP (ref 3.8–10.5)

## 2024-01-22 PROCEDURE — 99222 1ST HOSP IP/OBS MODERATE 55: CPT

## 2024-01-22 PROCEDURE — 99233 SBSQ HOSP IP/OBS HIGH 50: CPT | Mod: GC

## 2024-01-22 PROCEDURE — 99233 SBSQ HOSP IP/OBS HIGH 50: CPT

## 2024-01-22 PROCEDURE — 93306 TTE W/DOPPLER COMPLETE: CPT | Mod: 26

## 2024-01-22 RX ORDER — INSULIN HUMAN 100 [IU]/ML
30 INJECTION, SOLUTION SUBCUTANEOUS
Refills: 0 | Status: DISCONTINUED | OUTPATIENT
Start: 2024-01-22 | End: 2024-01-22

## 2024-01-22 RX ORDER — MAGNESIUM SULFATE 500 MG/ML
2 VIAL (ML) INJECTION ONCE
Refills: 0 | Status: COMPLETED | OUTPATIENT
Start: 2024-01-22 | End: 2024-01-22

## 2024-01-22 RX ORDER — LOSARTAN POTASSIUM 100 MG/1
25 TABLET, FILM COATED ORAL ONCE
Refills: 0 | Status: COMPLETED | OUTPATIENT
Start: 2024-01-22 | End: 2024-01-22

## 2024-01-22 RX ORDER — INSULIN LISPRO 100/ML
VIAL (ML) SUBCUTANEOUS
Refills: 0 | Status: DISCONTINUED | OUTPATIENT
Start: 2024-01-22 | End: 2024-01-25

## 2024-01-22 RX ORDER — POTASSIUM CHLORIDE 20 MEQ
40 PACKET (EA) ORAL EVERY 4 HOURS
Refills: 0 | Status: COMPLETED | OUTPATIENT
Start: 2024-01-22 | End: 2024-01-22

## 2024-01-22 RX ORDER — POTASSIUM CHLORIDE 20 MEQ
40 PACKET (EA) ORAL ONCE
Refills: 0 | Status: COMPLETED | OUTPATIENT
Start: 2024-01-22 | End: 2024-01-22

## 2024-01-22 RX ORDER — INSULIN HUMAN 100 [IU]/ML
50 INJECTION, SOLUTION SUBCUTANEOUS
Refills: 0 | Status: DISCONTINUED | OUTPATIENT
Start: 2024-01-22 | End: 2024-01-24

## 2024-01-22 RX ORDER — LOSARTAN POTASSIUM 100 MG/1
50 TABLET, FILM COATED ORAL DAILY
Refills: 0 | Status: DISCONTINUED | OUTPATIENT
Start: 2024-01-23 | End: 2024-01-23

## 2024-01-22 RX ADMIN — RIVAROXABAN 20 MILLIGRAM(S): KIT at 17:26

## 2024-01-22 RX ADMIN — LOSARTAN POTASSIUM 25 MILLIGRAM(S): 100 TABLET, FILM COATED ORAL at 17:50

## 2024-01-22 RX ADMIN — Medication 250 MICROGRAM(S): at 05:15

## 2024-01-22 RX ADMIN — Medication 650 MILLIGRAM(S): at 08:21

## 2024-01-22 RX ADMIN — Medication 650 MILLIGRAM(S): at 19:03

## 2024-01-22 RX ADMIN — Medication 25 GRAM(S): at 11:55

## 2024-01-22 RX ADMIN — INSULIN HUMAN 50 UNIT(S): 100 INJECTION, SOLUTION SUBCUTANEOUS at 10:05

## 2024-01-22 RX ADMIN — INSULIN HUMAN 50 UNIT(S): 100 INJECTION, SOLUTION SUBCUTANEOUS at 17:51

## 2024-01-22 RX ADMIN — Medication 650 MILLIGRAM(S): at 18:35

## 2024-01-22 RX ADMIN — Medication 125 MICROGRAM(S): at 05:16

## 2024-01-22 RX ADMIN — CARVEDILOL PHOSPHATE 12.5 MILLIGRAM(S): 80 CAPSULE, EXTENDED RELEASE ORAL at 05:15

## 2024-01-22 RX ADMIN — Medication 650 MILLIGRAM(S): at 07:39

## 2024-01-22 RX ADMIN — LOSARTAN POTASSIUM 25 MILLIGRAM(S): 100 TABLET, FILM COATED ORAL at 05:16

## 2024-01-22 RX ADMIN — Medication 40 MILLIGRAM(S): at 17:50

## 2024-01-22 RX ADMIN — Medication 40 MILLIEQUIVALENT(S): at 21:35

## 2024-01-22 RX ADMIN — Medication 40 MILLIGRAM(S): at 05:16

## 2024-01-22 RX ADMIN — CARVEDILOL PHOSPHATE 12.5 MILLIGRAM(S): 80 CAPSULE, EXTENDED RELEASE ORAL at 17:26

## 2024-01-22 RX ADMIN — Medication 1 APPLICATION(S): at 10:05

## 2024-01-22 RX ADMIN — Medication 1 APPLICATION(S): at 17:53

## 2024-01-22 RX ADMIN — Medication 40 MILLIEQUIVALENT(S): at 11:56

## 2024-01-22 RX ADMIN — Medication 40 MILLIEQUIVALENT(S): at 17:49

## 2024-01-22 RX ADMIN — ZOLPIDEM TARTRATE 5 MILLIGRAM(S): 10 TABLET ORAL at 21:40

## 2024-01-22 NOTE — PROGRESS NOTE ADULT - PROBLEM SELECTOR PLAN 1
- acute on chronic systolic CHF  - At Presentation: Pt with progressive LE edema for the past 3 weeks to groin level and increased SOB with minimal activity. Seen by her cardio outpatient about 2 weeks ago and started Lasix 20mg qd but pt was able to start it about 1 week ago with no significant improvement.  - severely volume overloaded (especially peripherally) on exam. Normal O2 sats on RA. Improved from admission.  - CXR/CCT: Signs of fluid overload noted on R>L lung parencyma c/w CHF exacerbation vs PNA  - pBNP 1029 in the setting of obesity.   - TTE 2/9/22 LVEF 40-45%, mild con LVH, mod inferior and posterior hypokinesis, mod MR, trace AI, mild PI, mod TR, RVSP 76. Repeat TTE 1/21/24 demonstrating similar findings w/ LVEF 35% and akinetic segments.  - Continue lasix 40mg IVP BID. Monitor I&Os  - Continue coreg and digoxin. Digoxin level within normal range.   - cardio consulted (Dr. Drake group), appreciate recs - acute on chronic systolic CHF  - At Presentation: Pt with progressive LE edema for the past 3 weeks to groin level and increased SOB with minimal activity. Seen by her cardio outpatient about 2 weeks ago and started Lasix 20mg qd but pt was able to start it about 1 week ago with no significant improvement.  - severely volume overloaded (especially peripherally) on exam. Normal O2 sats on RA. Improving LE edema from admission.  - CXR/CCT: Signs of fluid overload noted on R>L lung parenchyma c/w CHF exacerbation vs PNA  - pBNP 1029 in the setting of obesity.   - TTE 2/9/22 LVEF 40-45%, mild con LVH, mod inferior and posterior hypokinesis, mod MR, trace AI, mild PI, mod TR, RVSP 76. Repeat TTE 1/21/24 demonstrating similar findings w/ LVEF 35% and akinetic segments.  - Continue lasix 40mg IVP BID. Monitor I&Os  - replete electrolytes -- hypokalemia and hypomagnesemia   - Continue coreg and digoxin. Digoxin level within normal range.   - started on losartan -- will titrate up to 50mg po daily  - cardio consulted (Dr. Drake [facility]), appreciate recs

## 2024-01-22 NOTE — GOALS OF CARE CONVERSATION - ADVANCED CARE PLANNING - CONVERSATION DETAILS
Palliative care SW met with patient at bedside. Reviewed patient's medical and social history as well as events leading to patient's hospitalization. Writer discussed patient's current diagnosis (B/L LE wounds, Afib, AFlutter s/p Ablation, bradycardia and syncope s/p PPM; HTN; DM; cardiomyopathy), medical condition and management. Inquired about advanced directives. Patient states she does not have any advanced directives in place. Writer recommended completion of a HCP and patient in agreement. HCP completed naming patient's sister-in-law Janki Salgado as health care agent and document placed on patient's chart. SW also inquired about patient's wishes regarding extent of medical care to be provided including thoughts regarding cardiopulmonary resuscitation and mechanical ventilation/intubation. 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 She states that she would want CPR attempted and has discussed with her sister in law her wishes. Patient states she would not want to live on machines. Patient showed insight into medical condition. All questions answered. Psychosocial support provided.

## 2024-01-22 NOTE — CONSULT NOTE ADULT - ASSESSMENT
Physical Exam:   Vital Signs Last 24 Hrs  T(C): 37.1 (22 Jan 2024 11:55), Max: 37.1 (22 Jan 2024 11:55)  T(F): 98.7 (22 Jan 2024 11:55), Max: 98.7 (22 Jan 2024 11:55)  HR: 61 (22 Jan 2024 04:24) (61 - 76)  BP: 160/61 (22 Jan 2024 11:55) (160/61 - 180/83)  BP(mean): --  RR: 18 (22 Jan 2024 11:55) (18 - 19)  SpO2: 92% (22 Jan 2024 11:55) (92% - 95%)    Parameters below as of 22 Jan 2024 11:55  Patient On (Oxygen Delivery Method): room air             CAPILLARY BLOOD GLUCOSE      POCT Blood Glucose.: 133 mg/dL (22 Jan 2024 12:21)  POCT Blood Glucose.: 194 mg/dL (22 Jan 2024 10:02)  POCT Blood Glucose.: 121 mg/dL (22 Jan 2024 08:02)  POCT Blood Glucose.: 131 mg/dL (21 Jan 2024 20:57)  POCT Blood Glucose.: 248 mg/dL (21 Jan 2024 16:18)      Cholesterol, Serum: 113 mg/dL (05.19.21 @ 08:36)     HDL Cholesterol, Serum: 22 mg/dL (05.19.21 @ 08:36)     LDL Cholesterol Calculated: 66 mg/dL (05.19.21 @ 08:36)     DIET: CC  >50%

## 2024-01-22 NOTE — PROGRESS NOTE ADULT - PROBLEM SELECTOR PLAN 4
- Atrial fibrillation on xarelto, atrial flutter s/p ablation, bradycardia and syncope s/p ppm implant.  - EKG: Line A-paced, wide QRS rhythm, RBBB, Left anterior fascicular block, LV hypertrophy with repolarization abnormality    - continue home xarelto  - Continue Coreg  - remote tele

## 2024-01-22 NOTE — CONSULT NOTE ADULT - SUBJECTIVE AND OBJECTIVE BOX
Patient is a 73y old  Female who presents with a chief complaint of b/l LE wounds (22 Jan 2024 13:26)    Type:2 DX 20 years. No known complications.  Endocrine: Dr iXomara Borrero. Next appt: 2/3/2024. Rx home: U500 80 units 2xd. No Hx DKA/HHS, Glucometer checks- needs a new one- has supplies. Is to start elvin sensor with reader ("doesn't do apps), to send Rx pharmacy for new meter. diabetes education provided verbally and handouts.       HPI:  72 y/o F with PMHx of atrial fibrillation on xarelto, atrial flutter s/p ablation, bradycardia and syncope s/p ppm implant, HTN, DM, cardiomyopathy with moderate LV dysfunction, hypothyroid, hyperparathyroidism, depression, pulmonary HTN presents to ED on 1/20 with progressively worsening SOB and worsening weeping b/l LE wounds.  Patient reports increasing shortness of breath over the past few weeks that has progressed to the level of affecting her daily activities. Lives alone and normally able to care for herself but has been increasingly unable to due to SOB.  Patient also notes increased lower extremity swelling with edema. Pt has baseline level of edema, but this is worse than usual and has been accompanied by weeping wounds that have progressively worsened. Of note pt was recently started on lasix 20mg PO which has not provided much help. Notes that swelling involves her legs up to the hip and her abdomen. Denies fever, chills, chest pain, cough, abdominal pain, nausea, vomiting, diarrhea, constipation, urinary frequency, urgency, or dysuria, headaches, changes in vision, dizziness, numbness, tingling.    ED Course:   Vitals: BP: 182/92, HR: 68 , Temp: 98.3 , RR: 16 , SpO2: 98% on RA   Labs:  INR 1.78, Pro BNP 1029,   UA: protein 300, trace LE  CXR: Mild CHF at this time. Stable cardiac findings.  LE Duplex: No evidence of deep venous thrombosis in either lower extremity.  EKG: A-paced 61, Wide QRS rhythm,  Bifascicular block, Left ventricular hypertrophy with repolarization abnormality   Received clindamycin 900mg IVPB x1 in the ED    (20 Jan 2024 16:22)      PAST MEDICAL & SURGICAL HISTORY:  Hypothyroid      DM (diabetes mellitus)      HTN (hypertension)      Atrial fibrillation and flutter  s/p ablation      Bradycardia  s/p MDT PPM      H/O pulmonary hypertension      H/O hyperparathyroidism      H/O cardiomyopathy      Cardiac pacemaker      H/O cardiac radiofrequency ablation      H/O carpal tunnel repair      History of parathyroid surgery          Allergies    penicillin (Short breath; Rash)  statins (Vomiting)  vancomycin (Short breath; Rash)  erythromycin (Rash)    Intolerances        MEDICATIONS  (STANDING):  carvedilol 12.5 milliGRAM(s) Oral every 12 hours  clotrimazole 1% Cream 1 Application(s) Topical two times a day  dextrose 5%. 1000 milliLiter(s) (50 mL/Hr) IV Continuous <Continuous>  dextrose 5%. 1000 milliLiter(s) (100 mL/Hr) IV Continuous <Continuous>  dextrose 5%. 1000 milliLiter(s) (50 mL/Hr) IV Continuous <Continuous>  dextrose 5%. 1000 milliLiter(s) (50 mL/Hr) IV Continuous <Continuous>  dextrose 5%. 1000 milliLiter(s) (100 mL/Hr) IV Continuous <Continuous>  dextrose 50% Injectable 25 Gram(s) IV Push once  dextrose 50% Injectable 25 Gram(s) IV Push once  dextrose 50% Injectable 25 Gram(s) IV Push once  dextrose 50% Injectable 12.5 Gram(s) IV Push once  dextrose 50% Injectable 12.5 Gram(s) IV Push once  digoxin     Tablet 250 MICROGram(s) Oral daily  furosemide   Injectable 40 milliGRAM(s) IV Push two times a day  glucagon  Injectable 1 milliGRAM(s) IntraMuscular once  glucagon  Injectable 1 milliGRAM(s) IntraMuscular once  insulin lispro (ADMELOG) corrective regimen sliding scale   SubCutaneous at bedtime  insulin lispro (ADMELOG) corrective regimen sliding scale   SubCutaneous three times a day before meals  insulin regular (concentrated) human recombinant U-500 50 Unit(s) SubCutaneous two times a day with meals  levothyroxine 125 MICROGram(s) Oral daily  losartan 25 milliGRAM(s) Oral daily  potassium chloride    Tablet ER 40 milliEquivalent(s) Oral every 4 hours  rivaroxaban 20 milliGRAM(s) Oral with dinner       Patient is a 73y old  Female who presents with a chief complaint of b/l LE wounds (22 Jan 2024 13:26)    Type:2 DX 20 years. No known complications.  Endocrine: Dr Xiomara Borrero. Next appt: 2/3/2024. Rx home: U500 80 units 2xd. No Hx DKA/HHS, Glucometer checks- needs a new one- has supplies. Is to start elvin sensor with reader ("doesn't do apps), to send Rx pharmacy for new meter. Dx CHF- inquired about the use of [id number]- patient states had allergic reaction with hives and rash unable to recall name of medication.   diabetes education provided verbally and handouts.       HPI:  74 y/o F with PMHx of atrial fibrillation on xarelto, atrial flutter s/p ablation, bradycardia and syncope s/p ppm implant, HTN, DM, cardiomyopathy with moderate LV dysfunction, hypothyroid, hyperparathyroidism, depression, pulmonary HTN presents to ED on 1/20 with progressively worsening SOB and worsening weeping b/l LE wounds.  Patient reports increasing shortness of breath over the past few weeks that has progressed to the level of affecting her daily activities. Lives alone and normally able to care for herself but has been increasingly unable to due to SOB.  Patient also notes increased lower extremity swelling with edema. Pt has baseline level of edema, but this is worse than usual and has been accompanied by weeping wounds that have progressively worsened. Of note pt was recently started on lasix 20mg PO which has not provided much help. Notes that swelling involves her legs up to the hip and her abdomen. Denies fever, chills, chest pain, cough, abdominal pain, nausea, vomiting, diarrhea, constipation, urinary frequency, urgency, or dysuria, headaches, changes in vision, dizziness, numbness, tingling.    ED Course:   Vitals: BP: 182/92, HR: 68 , Temp: 98.3 , RR: 16 , SpO2: 98% on RA   Labs:  INR 1.78, Pro BNP 1029,   UA: protein 300, trace LE  CXR: Mild CHF at this time. Stable cardiac findings.  LE Duplex: No evidence of deep venous thrombosis in either lower extremity.  EKG: A-paced 61, Wide QRS rhythm,  Bifascicular block, Left ventricular hypertrophy with repolarization abnormality   Received clindamycin 900mg IVPB x1 in the ED    (20 Jan 2024 16:22)      PAST MEDICAL & SURGICAL HISTORY:  Hypothyroid      DM (diabetes mellitus)      HTN (hypertension)      Atrial fibrillation and flutter  s/p ablation      Bradycardia  s/p MDT PPM      H/O pulmonary hypertension      H/O hyperparathyroidism      H/O cardiomyopathy      Cardiac pacemaker      H/O cardiac radiofrequency ablation      H/O carpal tunnel repair      History of parathyroid surgery          Allergies    penicillin (Short breath; Rash)  statins (Vomiting)  vancomycin (Short breath; Rash)  erythromycin (Rash)    Intolerances        MEDICATIONS  (STANDING):  carvedilol 12.5 milliGRAM(s) Oral every 12 hours  clotrimazole 1% Cream 1 Application(s) Topical two times a day  dextrose 5%. 1000 milliLiter(s) (50 mL/Hr) IV Continuous <Continuous>  dextrose 5%. 1000 milliLiter(s) (100 mL/Hr) IV Continuous <Continuous>  dextrose 5%. 1000 milliLiter(s) (50 mL/Hr) IV Continuous <Continuous>  dextrose 5%. 1000 milliLiter(s) (50 mL/Hr) IV Continuous <Continuous>  dextrose 5%. 1000 milliLiter(s) (100 mL/Hr) IV Continuous <Continuous>  dextrose 50% Injectable 25 Gram(s) IV Push once  dextrose 50% Injectable 25 Gram(s) IV Push once  dextrose 50% Injectable 25 Gram(s) IV Push once  dextrose 50% Injectable 12.5 Gram(s) IV Push once  dextrose 50% Injectable 12.5 Gram(s) IV Push once  digoxin     Tablet 250 MICROGram(s) Oral daily  furosemide   Injectable 40 milliGRAM(s) IV Push two times a day  glucagon  Injectable 1 milliGRAM(s) IntraMuscular once  glucagon  Injectable 1 milliGRAM(s) IntraMuscular once  insulin lispro (ADMELOG) corrective regimen sliding scale   SubCutaneous at bedtime  insulin lispro (ADMELOG) corrective regimen sliding scale   SubCutaneous three times a day before meals  insulin regular (concentrated) human recombinant U-500 50 Unit(s) SubCutaneous two times a day with meals  levothyroxine 125 MICROGram(s) Oral daily  losartan 25 milliGRAM(s) Oral daily  potassium chloride    Tablet ER 40 milliEquivalent(s) Oral every 4 hours  rivaroxaban 20 milliGRAM(s) Oral with dinner

## 2024-01-22 NOTE — PROGRESS NOTE ADULT - SUBJECTIVE AND OBJECTIVE BOX
[facility] -- Teresa Grewal Wachsman, Kenneth Gonzalez Savella, Goodger, Cohen  Office # [phone]    Follow Up:  LE edema     Subjective/Observations:. No events overnight resting comfortably in bed.  No complaints of chest pain, dyspnea, or palpitations reported.   Has baseline orthopnea, on RA.  Reports dizziness with positional change.     REVIEW OF SYSTEMS: All other review of systems is negative unless indicated above  PAST MEDICAL & SURGICAL HISTORY:  Hypothyroid      DM (diabetes mellitus)      HTN (hypertension)      Atrial fibrillation and flutter  s/p ablation      Bradycardia  s/p MDT PPM      H/O pulmonary hypertension      H/O hyperparathyroidism      H/O cardiomyopathy      Cardiac pacemaker      H/O cardiac radiofrequency ablation      H/O carpal tunnel repair      History of parathyroid surgery        MEDICATIONS  (STANDING):  carvedilol 12.5 milliGRAM(s) Oral every 12 hours  clotrimazole 1% Cream 1 Application(s) Topical two times a day  dextrose 5%. 1000 milliLiter(s) (100 mL/Hr) IV Continuous <Continuous>  dextrose 5%. 1000 milliLiter(s) (50 mL/Hr) IV Continuous <Continuous>  dextrose 5%. 1000 milliLiter(s) (50 mL/Hr) IV Continuous <Continuous>  dextrose 5%. 1000 milliLiter(s) (50 mL/Hr) IV Continuous <Continuous>  dextrose 5%. 1000 milliLiter(s) (100 mL/Hr) IV Continuous <Continuous>  dextrose 50% Injectable 25 Gram(s) IV Push once  dextrose 50% Injectable 25 Gram(s) IV Push once  dextrose 50% Injectable 12.5 Gram(s) IV Push once  dextrose 50% Injectable 25 Gram(s) IV Push once  dextrose 50% Injectable 12.5 Gram(s) IV Push once  digoxin     Tablet 250 MICROGram(s) Oral daily  furosemide   Injectable 40 milliGRAM(s) IV Push two times a day  glucagon  Injectable 1 milliGRAM(s) IntraMuscular once  glucagon  Injectable 1 milliGRAM(s) IntraMuscular once  insulin lispro (ADMELOG) corrective regimen sliding scale   SubCutaneous at bedtime  insulin lispro (ADMELOG) corrective regimen sliding scale   SubCutaneous three times a day before meals  insulin regular (concentrated) human recombinant U-500 50 Unit(s) SubCutaneous two times a day with meals  levothyroxine 125 MICROGram(s) Oral daily  losartan 25 milliGRAM(s) Oral daily  rivaroxaban 20 milliGRAM(s) Oral with dinner    MEDICATIONS  (PRN):  acetaminophen     Tablet .. 650 milliGRAM(s) Oral every 6 hours PRN Mild Pain (1 - 3)  dextrose Oral Gel 15 Gram(s) Oral once PRN Blood Glucose LESS THAN 70 milliGRAM(s)/deciliter  zolpidem 5 milliGRAM(s) Oral at bedtime PRN Insomnia  zolpidem 5 milliGRAM(s) Oral at bedtime PRN Insomnia    Allergies    penicillin (Short breath; Rash)  statins (Vomiting)  vancomycin (Short breath; Rash)  erythromycin (Rash)    Intolerances      Vital Signs Last 24 Hrs  T(C): 36.2 (22 Jan 2024 04:24), Max: 36.9 (21 Jan 2024 12:39)  T(F): 97.2 (22 Jan 2024 04:24), Max: 98.5 (21 Jan 2024 12:39)  HR: 61 (22 Jan 2024 04:24) (61 - 76)  BP: 180/83 (22 Jan 2024 04:24) (170/81 - 188/80)  BP(mean): --  RR: 19 (22 Jan 2024 04:24) (18 - 19)  SpO2: 94% (22 Jan 2024 04:24) (94% - 98%)    Parameters below as of 22 Jan 2024 04:24  Patient On (Oxygen Delivery Method): room air      I&O's Summary    21 Jan 2024 07:01  -  22 Jan 2024 07:00  --------------------------------------------------------  IN: 0 mL / OUT: 550 mL / NET: -550 mL    22 Jan 2024 07:01  -  22 Jan 2024 11:36  --------------------------------------------------------  IN: 0 mL / OUT: 1200 mL / NET: -1200 mL        Tele: paced  PHYSICAL EXAM:  Constitutional: NAD, awake and alert, well-developed  HEENT: Moist Mucous Membranes, Anicteric  Pulmonary: Non-labored, breath sounds are diminished   Cardiovascular: Regular, S1 and S2 nl, No murmurs, rubs, gallops or clicks  Gastrointestinal: Bowel Sounds present, soft, nontender.   Lymph:+ pitting peripheral edema.  Skin: No visible rashes or ulcers.  Psych:  Mood & affect appropriate  LABS: All Labs Reviewed:                        13.2   5.74  )-----------( 162      ( 22 Jan 2024 08:25 )             42.0                         13.4   5.24  )-----------( 154      ( 21 Jan 2024 06:28 )             42.7                         13.1   5.55  )-----------( 157      ( 20 Jan 2024 13:30 )             41.7     22 Jan 2024 08:25    139    |  100    |  16     ----------------------------<  129    3.4     |  34     |  0.72   21 Jan 2024 06:28    140    |  101    |  17     ----------------------------<  304    4.4     |  34     |  0.83   20 Jan 2024 13:30    140    |  104    |  16     ----------------------------<  141    4.0     |  32     |  0.69     Ca    9.0        22 Jan 2024 08:25  Ca    9.3        21 Jan 2024 06:28  Ca    8.9        20 Jan 2024 13:30  Phos  3.9       22 Jan 2024 08:25  Mg     1.5       22 Jan 2024 08:25    TPro  7.5    /  Alb  3.4    /  TBili  1.0    /  DBili  x      /  AST  26     /  ALT  19     /  AlkPhos  114    21 Jan 2024 06:28  TPro  7.1    /  Alb  3.1    /  TBili  0.9    /  DBili  x      /  AST  27     /  ALT  19     /  AlkPhos  116    20 Jan 2024 13:30    PT/INR - ( 20 Jan 2024 13:30 )   PT: 20.5 sec;   INR: 1.78 ratio         PTT - ( 20 Jan 2024 13:30 )  PTT:37.5 sec      12 Lead ECG:   Ventricular Rate 61 BPM    QRS Duration 168 ms    Q-T Interval 456 ms    QTC Calculation(Bazett) 459 ms    R Axis -54 degrees    T Axis 111 degrees    Diagnosis Line A-paced  Wide QRS rhythm  Right bundle branch block  Left anterior fascicular block  *** Bifascicular block ***  Left ventricular hypertrophy with repolarization abnormality ( R in aVL )  Abnormal ECG  No previous ECGs available  Confirmed by Shahrzad Drake ([id number]) on 1/20/2024 2:51:23 PM (01-20-24 @ 13:08)        [facility] -- Teresa Grewal Wachsman, Kenneth Gonzalez Savella, Goodger, Cohen  Office # [phone]    Follow Up:  LE edema     Subjective/Observations:. No events overnight resting comfortably in bed.  No complaints of chest pain, dyspnea, or palpitations reported.   Has baseline orthopnea, on RA.  Reports dizziness with positional change.     REVIEW OF SYSTEMS: All other review of systems is negative unless indicated above  PAST MEDICAL & SURGICAL HISTORY:  Hypothyroid      DM (diabetes mellitus)      HTN (hypertension)      Atrial fibrillation and flutter  s/p ablation      Bradycardia  s/p MDT PPM      H/O pulmonary hypertension      H/O hyperparathyroidism      H/O cardiomyopathy      Cardiac pacemaker      H/O cardiac radiofrequency ablation      H/O carpal tunnel repair      History of parathyroid surgery        MEDICATIONS  (STANDING):  carvedilol 12.5 milliGRAM(s) Oral every 12 hours  clotrimazole 1% Cream 1 Application(s) Topical two times a day  dextrose 5%. 1000 milliLiter(s) (100 mL/Hr) IV Continuous <Continuous>  dextrose 5%. 1000 milliLiter(s) (50 mL/Hr) IV Continuous <Continuous>  dextrose 5%. 1000 milliLiter(s) (50 mL/Hr) IV Continuous <Continuous>  dextrose 5%. 1000 milliLiter(s) (50 mL/Hr) IV Continuous <Continuous>  dextrose 5%. 1000 milliLiter(s) (100 mL/Hr) IV Continuous <Continuous>  dextrose 50% Injectable 25 Gram(s) IV Push once  dextrose 50% Injectable 25 Gram(s) IV Push once  dextrose 50% Injectable 12.5 Gram(s) IV Push once  dextrose 50% Injectable 25 Gram(s) IV Push once  dextrose 50% Injectable 12.5 Gram(s) IV Push once  digoxin     Tablet 250 MICROGram(s) Oral daily  furosemide   Injectable 40 milliGRAM(s) IV Push two times a day  glucagon  Injectable 1 milliGRAM(s) IntraMuscular once  glucagon  Injectable 1 milliGRAM(s) IntraMuscular once  insulin lispro (ADMELOG) corrective regimen sliding scale   SubCutaneous at bedtime  insulin lispro (ADMELOG) corrective regimen sliding scale   SubCutaneous three times a day before meals  insulin regular (concentrated) human recombinant U-500 50 Unit(s) SubCutaneous two times a day with meals  levothyroxine 125 MICROGram(s) Oral daily  losartan 25 milliGRAM(s) Oral daily  rivaroxaban 20 milliGRAM(s) Oral with dinner    MEDICATIONS  (PRN):  acetaminophen     Tablet .. 650 milliGRAM(s) Oral every 6 hours PRN Mild Pain (1 - 3)  dextrose Oral Gel 15 Gram(s) Oral once PRN Blood Glucose LESS THAN 70 milliGRAM(s)/deciliter  zolpidem 5 milliGRAM(s) Oral at bedtime PRN Insomnia  zolpidem 5 milliGRAM(s) Oral at bedtime PRN Insomnia    Allergies    penicillin (Short breath; Rash)  statins (Vomiting)  vancomycin (Short breath; Rash)  erythromycin (Rash)    Intolerances      Vital Signs Last 24 Hrs  T(C): 36.2 (22 Jan 2024 04:24), Max: 36.9 (21 Jan 2024 12:39)  T(F): 97.2 (22 Jan 2024 04:24), Max: 98.5 (21 Jan 2024 12:39)  HR: 61 (22 Jan 2024 04:24) (61 - 76)  BP: 180/83 (22 Jan 2024 04:24) (170/81 - 188/80)  BP(mean): --  RR: 19 (22 Jan 2024 04:24) (18 - 19)  SpO2: 94% (22 Jan 2024 04:24) (94% - 98%)    Parameters below as of 22 Jan 2024 04:24  Patient On (Oxygen Delivery Method): room air      I&O's Summary    21 Jan 2024 07:01  -  22 Jan 2024 07:00  --------------------------------------------------------  IN: 0 mL / OUT: 550 mL / NET: -550 mL    22 Jan 2024 07:01  -  22 Jan 2024 11:36  --------------------------------------------------------  IN: 0 mL / OUT: 1200 mL / NET: -1200 mL        Tele: paced  PHYSICAL EXAM:  Constitutional: NAD, awake and alert, well-developed  HEENT: Moist Mucous Membranes, Anicteric  Pulmonary: Non-labored, breath sounds are diminished   Cardiovascular: Regular, S1 and S2 nl, No murmurs, rubs, gallops or clicks  Gastrointestinal: Bowel Sounds present, soft, nontender.   Lymph:+ pitting peripheral edema.  Skin: No visible rashes or ulcers.  Psych:  Mood & affect appropriate  LABS: All Labs Reviewed:                        13.2   5.74  )-----------( 162      ( 22 Jan 2024 08:25 )             42.0                         13.4   5.24  )-----------( 154      ( 21 Jan 2024 06:28 )             42.7                         13.1   5.55  )-----------( 157      ( 20 Jan 2024 13:30 )             41.7     22 Jan 2024 08:25    139    |  100    |  16     ----------------------------<  129    3.4     |  34     |  0.72   21 Jan 2024 06:28    140    |  101    |  17     ----------------------------<  304    4.4     |  34     |  0.83   20 Jan 2024 13:30    140    |  104    |  16     ----------------------------<  141    4.0     |  32     |  0.69     Ca    9.0        22 Jan 2024 08:25  Ca    9.3        21 Jan 2024 06:28  Ca    8.9        20 Jan 2024 13:30  Phos  3.9       22 Jan 2024 08:25  Mg     1.5       22 Jan 2024 08:25    TPro  7.5    /  Alb  3.4    /  TBili  1.0    /  DBili  x      /  AST  26     /  ALT  19     /  AlkPhos  114    21 Jan 2024 06:28  TPro  7.1    /  Alb  3.1    /  TBili  0.9    /  DBili  x      /  AST  27     /  ALT  19     /  AlkPhos  116    20 Jan 2024 13:30    PT/INR - ( 20 Jan 2024 13:30 )   PT: 20.5 sec;   INR: 1.78 ratio         PTT - ( 20 Jan 2024 13:30 )  PTT:37.5 sec      12 Lead ECG:   Ventricular Rate 61 BPM    QRS Duration 168 ms    Q-T Interval 456 ms    QTC Calculation(Bazett) 459 ms    R Axis -54 degrees    T Axis 111 degrees    Diagnosis Line A-paced  Wide QRS rhythm  Right bundle branch block  Left anterior fascicular block  *** Bifascicular block ***  Left ventricular hypertrophy with repolarization abnormality ( R in aVL )  Abnormal ECG  No previous ECGs available  Confirmed by Shahrzad Drake ([id number]) on 1/20/2024 2:51:23 PM (01-20-24 @ 13:08)

## 2024-01-22 NOTE — PROGRESS NOTE ADULT - ASSESSMENT
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 Cardiology called for SOB. Dr. Ellis: Cardio.     A fib/Flutter, HTN, Cardiomyopathy, mod LVSD, Pulm HTN, PPM  - She saw her cardiologist and was started on lasix 20 mg PO daily last week.   - Known CHF w/ mod LVSD and pulm HTN   - Echo: TTE 2/9/22 LVEF 40-45%, LA 5.3cm, mild con LVH, mod inferior and posterior hypokinesis, mod MR, trace AI, mild PI, mod TR, RVSP 76   - Please obtain transthoracic echocardiogram to assess ventricular function, wall motion and valvular abnormalities.   - BNP 1029  - CXR with diffuse mild congestive changes  - remains volume overloaded on exam, b/l le edema, orthopnea, ferrari  - continue  Lasix 40 mg IV BID   - K 3.4, replete K >4  - Monitor strict i/o and daily weight   - check orthostatic  - bp controlled, continue losartan, coreg  -LE doppler negative for DVT     - known A fib  - Continue Coreg and digoxin  - Continue Xarelto    - EKG showed A paced rhythm/ Aflib @ 61  - Troponin HsI 50.7  - No evidence of any active ischemia     - Monitor and replete lytes, keep K>4, Mg>2.  - Will continue to follow.    Mary iSms NP  Nurse Practitioner- Cardiology   Call TEAMS 73F PMH atrial fibrillation and atrial flutter s/p ablation, HTN, DM, cardiomyopathy with moderate LV dysfunction, pulmonary hypertension, depression, bradycardia and conduction disease s/p Medtronic dual chamber pacemaker implant, edema, hypothyroidism, hyperparathyroidism, presents with shortness of breath, chest pressure and LE edema. Cardiology called for SOB. Dr. Ellis: Cardio.     A fib/Flutter, HTN, Cardiomyopathy, mod LVSD, Pulm HTN, PPM  - She saw her cardiologist and was started on lasix 20 mg PO daily last week.   - Known CHF w/ mod LVSD and pulm HTN   - Echo: TTE 2/9/22 LVEF 40-45%, LA 5.3cm, mild con LVH, mod inferior and posterior hypokinesis, mod MR, trace AI, mild PI, mod TR, RVSP 76   - Please obtain transthoracic echocardiogram to assess ventricular function, wall motion and valvular abnormalities.   - BNP 1029  - CXR with diffuse mild congestive changes  - remains volume overloaded on exam, b/l le edema, orthopnea, ferrari  - Would increase Lasix to 60mg IV BID after aggressive K repletion  - K 3.4, replete K >4  - Monitor strict i/o and daily weight   - check orthostatic  - bp controlled, continue losartan, coreg  -LE doppler negative for DVT     - known A fib  - Continue Coreg and digoxin  - Continue Xarelto    - Needs BP control  - Increase Losartan to 50mg and continue uptitrating for BP control    - EKG showed A paced rhythm/ Aflib @ 61  - Troponin HsI 50.7  - No evidence of any active ischemia     - Monitor and replete lytes, keep K>4, Mg>2.  - Will continue to follow.    Mary Sims NP  Nurse Practitioner- Cardiology   Call TEAMS

## 2024-01-22 NOTE — PROGRESS NOTE ADULT - SUBJECTIVE AND OBJECTIVE BOX
ROSITA GUTIERREZ is a 73yFemale , patient examined and chart reviewed.     INTERVAL HPI/ OVERNIGHT EVENTS:   No events. Afebrile.  Feeling better.     PAST MEDICAL & SURGICAL HISTORY:  Hypothyroid  DM (diabetes mellitus)  HTN (hypertension)  Atrial fibrillation and flutter  s/p ablation  Bradycardia  s/p MDT PPM  H/O pulmonary hypertension  H/O hyperparathyroidism  H/O cardiomyopathy  Cardiac pacemaker  H/O cardiac radiofrequency ablation  H/O carpal tunnel repair  History of parathyroid surgery    For details regarding the patient's social history, family history, and other miscellaneous elements, please refer the initial infectious diseases consultation and/or the admitting history and physical examination for this admission.    ROS:  CONSTITUTIONAL:  Negative fever or chills  EYES:  Negative  blurry vision or double vision  CARDIOVASCULAR:  Negative for chest pain or palpitations  RESPIRATORY:  Negative for cough, wheezing, or SOB   GASTROINTESTINAL:  Negative for nausea, vomiting, diarrhea, constipation, or abdominal pain  GENITOURINARY:  Negative frequency, urgency or dysuria  NEUROLOGIC:  No headache, confusion, dizziness, lightheadedness  All other systems were reviewed and are negative     ALLERGIES  penicillin (Short breath; Rash)  statins (Vomiting)  vancomycin (Short breath; Rash)  erythromycin (Rash)      Current inpatient medications :    ANTIBIOTICS/RELEVANT:    MEDICATIONS  (STANDING):  carvedilol 12.5 milliGRAM(s) Oral every 12 hours  clotrimazole 1% Cream 1 Application(s) Topical two times a day  dextrose 5%. 1000 milliLiter(s) (50 mL/Hr) IV Continuous <Continuous>  dextrose 5%. 1000 milliLiter(s) (100 mL/Hr) IV Continuous <Continuous>  dextrose 5%. 1000 milliLiter(s) (50 mL/Hr) IV Continuous <Continuous>  dextrose 5%. 1000 milliLiter(s) (50 mL/Hr) IV Continuous <Continuous>  dextrose 5%. 1000 milliLiter(s) (100 mL/Hr) IV Continuous <Continuous>  dextrose 50% Injectable 25 Gram(s) IV Push once  dextrose 50% Injectable 12.5 Gram(s) IV Push once  dextrose 50% Injectable 25 Gram(s) IV Push once  dextrose 50% Injectable 25 Gram(s) IV Push once  dextrose 50% Injectable 12.5 Gram(s) IV Push once  digoxin     Tablet 250 MICROGram(s) Oral daily  furosemide   Injectable 40 milliGRAM(s) IV Push two times a day  glucagon  Injectable 1 milliGRAM(s) IntraMuscular once  glucagon  Injectable 1 milliGRAM(s) IntraMuscular once  insulin lispro (ADMELOG) corrective regimen sliding scale   SubCutaneous at bedtime  insulin lispro (ADMELOG) corrective regimen sliding scale   SubCutaneous three times a day before meals  insulin regular (concentrated) human recombinant U-500 50 Unit(s) SubCutaneous two times a day with meals  levothyroxine 125 MICROGram(s) Oral daily  losartan 25 milliGRAM(s) Oral daily  potassium chloride    Tablet ER 40 milliEquivalent(s) Oral every 4 hours  rivaroxaban 20 milliGRAM(s) Oral with dinner    MEDICATIONS  (PRN):  acetaminophen     Tablet .. 650 milliGRAM(s) Oral every 6 hours PRN Mild Pain (1 - 3)  dextrose Oral Gel 15 Gram(s) Oral once PRN Blood Glucose LESS THAN 70 milliGRAM(s)/deciliter  zolpidem 5 milliGRAM(s) Oral at bedtime PRN Insomnia  zolpidem 5 milliGRAM(s) Oral at bedtime PRN Insomnia      Objective:    Vital Signs Last 24 Hrs  T(C): 37.1 (22 Jan 2024 11:55), Max: 37.1 (22 Jan 2024 11:55)  T(F): 98.7 (22 Jan 2024 11:55), Max: 98.7 (22 Jan 2024 11:55)  HR: 61 (22 Jan 2024 04:24) (61 - 76)  BP: 160/61 (22 Jan 2024 11:55) (160/61 - 180/83)  RR: 18 (22 Jan 2024 11:55) (18 - 19)  SpO2: 92% (22 Jan 2024 11:55) (92% - 95%)    Parameters below as of 22 Jan 2024 11:55  Patient On (Oxygen Delivery Method): room air      Physical Exam:  General: no acute distress  Neck: supple, trachea midline  Lungs: clear, no wheeze/rhonchi  Cardiovascular: regular rate and rhythm, S1 S2  Abdomen: soft, nontender,  bowel sounds normal  Neurological: alert and oriented x3  Skin: no rash  Extremities: Decreasing edema chronic stasis  Drainage resolved ulcer c/d/i      LABS:                        13.2   5.74  )-----------( 162      ( 22 Jan 2024 08:25 )             42.0   01-22    139  |  100  |  16  ----------------------------<  129<H>  3.4<L>   |  34<H>  |  0.72    Ca    9.0      22 Jan 2024 08:25  Phos  3.9     01-22  Mg     1.5     01-22    TPro  7.5  /  Alb  3.4  /  TBili  1.0  /  DBili  x   /  AST  26  /  ALT  19  /  AlkPhos  114  01-21      MICROBIOLOGY:    Culture - Blood (collected 20 Jan 2024 13:30)  Source: .Blood Blood-Peripheral  Preliminary Report (21 Jan 2024 22:02):    No growth at 24 hours    Culture - Blood (collected 20 Jan 2024 13:25)  Source: .Blood Blood-Peripheral  Preliminary Report (21 Jan 2024 22:02):    No growth at 24 hours    RADIOLOGY & ADDITIONAL STUDIES:  ACC: [id number] EXAM:  US DPLX LWR EXT VEINS COMPL BI   ORDERED BY:   RAZA JESSICA     PROCEDURE DATE:  01/20/2024          INTERPRETATION:  CLINICAL INFORMATION: Bilateral leg swelling.    COMPARISON: None available.    TECHNIQUE: Duplex sonography of the BILATERAL LOWER extremity veins with   color and spectral Doppler, with and without compression.    FINDINGS:    RIGHT:  Normal compressibility of the RIGHT common femoral, femoral and popliteal   veins.  Doppler examination shows normal spontaneous and phasic flow.  Limited visualized of the RIGHT calf veins. No RIGHT calf vein thrombosis   detected.    LEFT:  Normal compressibility of the LEFT common femoral, femoral and popliteal   veins.  Doppler examination shows normal spontaneous and phasic flow.  Limited visualized of the LEFT calf veins. No LEFT calf vein thrombosis   detected.    IMPRESSION:  No evidence of deep venous thrombosis in either lower extremity.      ACC: [id number] EXAM:  XR CHEST PORTABLE IMMED 1V   ORDERED BY: RAZA JESSICA     PROCEDURE DATE:  01/20/2024          INTERPRETATION:  AP chest on January 20, 2024 at 1:17 PM. Patient has   sepsis.    Heart enlargement and left-sided pacemaker again noted.    There are diffuse mild congestive changes somewhat increased from March 8, 2012.    IMPRESSION: Mild CHF at this time. Stable cardiac findings.      Assessment :   72YO F with PMHx of atrial fibrillation on xarelto, atrial flutter s/p ablation, bradycardia and syncope s/p ppm implant, HTN, DM, cardiomyopathy with moderate LV dysfunction, hypothyroid, hyperparathyroidism, depression, pulmonary HTN presents to ED with progressively worsening SOB and worsening wander LE edema with weeping b/l LE wounds.  Fluid overload with CHD exacerbation wander LE edema/anasarca  CXR with CHF  No concern for leg cellulitis  Cultures NGTD  Clinically improving    Plan :   Monitor off antibiotics  Trend temps and cbc  Diurese per cardio  Elevate leg  Local wound care  Pulm toileting  Stable from ID standpoint    Continue with present regiment.  Appropriate use of antibiotics and adverse effects reviewed.      > 35 minutes were spent in direct patient care reviewing notes, medications ,labs data/ imaging , discussion with multidisciplinary team.    Thank you for allowing me to participate in care of your patient .    Gina Driscoll MD  Infectious Disease  [phone]

## 2024-01-22 NOTE — PROGRESS NOTE ADULT - PROBLEM SELECTOR PLAN 2
- Pt with progressively worsening b/l LE leg wounds weeping serosanguinous fluid, L>R. b/l LE with extensive pitting edema from foot to hip in the setting of volume overload and likely chronic venous insufficiency.   - Minimal erythema, serosanguinous fluid.    - Afebrile, normal white count. Doubt cellulitis at this time.   - Hold off Abx and monitor clinical course, temp and WBC.   - f/u BCx  - f/u wound culture  - ID consulted (Dr Driscoll), recs appreciated  - PT/OT eval rec DAYNA. - Pt with progressively worsening b/l LE leg wounds weeping serosanguinous fluid, L>R. b/l LE with extensive pitting edema from foot to hip in the setting of volume overload and likely chronic venous insufficiency.   - Minimal erythema, serosanguinous fluid.    - Afebrile, normal white count. Doubt cellulitis at this time.   - Hold off Abx and monitor clinical course, temp and WBC.   - f/u BCx - NGTD  - f/u wound culture  - ID consulted (Dr Driscoll), recs appreciated  - PT/OT eval rec DAYNA.

## 2024-01-22 NOTE — PROGRESS NOTE ADULT - PROBLEM SELECTOR PLAN 3
Chronic HTN  - hypertensive urgency with SBP in 180s  - Pt was outpatient on Hctz 25 mg qd and was discontinued 2 weeks ago by cardio and started Lasix.   - Persistent elevated BP since admission  - Continue Lasix 40mg IV BID  - Continue home coreg  - Start losartan 25mg daily  - Monitor BP Chronic HTN  - hypertensive urgency with SBP in 180s  - Pt was outpatient on HCTZ 25 mg qd and was discontinued 2 weeks ago by cardio and started Lasix.   - Persistent elevated BP since admission  - Continue Lasix 40mg IV BID  - Continue home coreg  - started on losartan -- will titrate up to 50mg po daily  - Monitor BP

## 2024-01-22 NOTE — PROGRESS NOTE ADULT - PROBLEM SELECTOR PLAN 5
Chronic.  A1c 8.8%on admission.   - Pt at home on Humulin R U-500, with 80 U AM and 80U PM.   - will c/w pt's home insulin that she brought from home, but at lower dose for now -- Continue 50U AM and PM for now as pt states she is eating less in the hospital  - REBEKAH  - Diabetic diet, Accucheck and hypoglycemia protocol.   - Diabetes NP consult  - Endo Dr. Perlman Consult Chronic.  A1c 8.8%on admission.   - Pt at home on Humulin R U-500, with 80 U AM and 80U PM.   - will c/w pt's home insulin that she brought from home, but at lower dose for now -- Continue 50U AM and PM for now as pt is eating less in the hospital and FSG in the 100s  - decrease to low dose ISS pre-meal; d/c QHS sliding scale  - Diabetic diet, Accucheck and hypoglycemia protocol.   - Diabetes NP consult (Weil), recs appreciated

## 2024-01-22 NOTE — PROGRESS NOTE ADULT - ASSESSMENT
72 y/o F with PMHx of atrial fibrillation on xarelto, atrial flutter s/p ablation, bradycardia and syncope s/p PPM, HTN, DM2, HFrEF w/ moderate LV dysfunction, hypothyroid, hyperparathyroidism, depression, pulmonary HTN presents to ED on 1/20 with progressively worsening SOB and worsening weeping edema a/w acute on chronic systolic CHF.

## 2024-01-22 NOTE — PROGRESS NOTE ADULT - SUBJECTIVE AND OBJECTIVE BOX
Patient is a 73y old  Female who presents with a chief complaint of b/l LE wounds (22 Jan 2024 11:36)      INTERVAL HPI/OVERNIGHT EVENTS: Patient seen and examined at bedside. No overnight events occurred. Patient endorses improved BLLE swelling and chest pressure. Endorses ongoing SOB while at rest and sitting up in bed. States that she has struggled with depression since 2019 and believes that it has affected her ability to take care of herself. Denies fevers, chills, headache, lightheadedness, abdominal pain, n/v/d/c.    MEDICATIONS  (STANDING):  carvedilol 12.5 milliGRAM(s) Oral every 12 hours  clotrimazole 1% Cream 1 Application(s) Topical two times a day  dextrose 5%. 1000 milliLiter(s) (50 mL/Hr) IV Continuous <Continuous>  dextrose 5%. 1000 milliLiter(s) (100 mL/Hr) IV Continuous <Continuous>  dextrose 5%. 1000 milliLiter(s) (50 mL/Hr) IV Continuous <Continuous>  dextrose 5%. 1000 milliLiter(s) (100 mL/Hr) IV Continuous <Continuous>  dextrose 5%. 1000 milliLiter(s) (50 mL/Hr) IV Continuous <Continuous>  dextrose 50% Injectable 25 Gram(s) IV Push once  dextrose 50% Injectable 25 Gram(s) IV Push once  dextrose 50% Injectable 12.5 Gram(s) IV Push once  dextrose 50% Injectable 25 Gram(s) IV Push once  dextrose 50% Injectable 12.5 Gram(s) IV Push once  digoxin     Tablet 250 MICROGram(s) Oral daily  furosemide   Injectable 40 milliGRAM(s) IV Push two times a day  glucagon  Injectable 1 milliGRAM(s) IntraMuscular once  glucagon  Injectable 1 milliGRAM(s) IntraMuscular once  insulin lispro (ADMELOG) corrective regimen sliding scale   SubCutaneous three times a day before meals  insulin lispro (ADMELOG) corrective regimen sliding scale   SubCutaneous at bedtime  insulin regular (concentrated) human recombinant U-500 50 Unit(s) SubCutaneous two times a day with meals  levothyroxine 125 MICROGram(s) Oral daily  losartan 25 milliGRAM(s) Oral daily  potassium chloride    Tablet ER 40 milliEquivalent(s) Oral every 4 hours  rivaroxaban 20 milliGRAM(s) Oral with dinner    MEDICATIONS  (PRN):  acetaminophen     Tablet .. 650 milliGRAM(s) Oral every 6 hours PRN Mild Pain (1 - 3)  dextrose Oral Gel 15 Gram(s) Oral once PRN Blood Glucose LESS THAN 70 milliGRAM(s)/deciliter  zolpidem 5 milliGRAM(s) Oral at bedtime PRN Insomnia  zolpidem 5 milliGRAM(s) Oral at bedtime PRN Insomnia      Allergies    penicillin (Short breath; Rash)  statins (Vomiting)  vancomycin (Short breath; Rash)  erythromycin (Rash)    Intolerances        REVIEW OF SYSTEMS:  CONSTITUTIONAL: No fever or chills  HEENT:  No headache, no sore throat  RESPIRATORY: No cough, wheezing, or shortness of breath at rest ; +SALCIDO  CARDIOVASCULAR: No chest pain, palpitations  GASTROINTESTINAL: No abd pain, nausea, vomiting, or diarrhea  GENITOURINARY: No dysuria  NEUROLOGICAL: no focal weakness or dizziness  MUSCULOSKELETAL: leg swelling with weeping edema; no myalgias     Vital Signs Last 24 Hrs  T(C): 37.1 (22 Jan 2024 11:55), Max: 37.1 (22 Jan 2024 11:55)  T(F): 98.7 (22 Jan 2024 11:55), Max: 98.7 (22 Jan 2024 11:55)  HR: 61 (22 Jan 2024 04:24) (61 - 76)  BP: 160/61 (22 Jan 2024 11:55) (160/61 - 182/79)  BP(mean): --  RR: 18 (22 Jan 2024 11:55) (18 - 19)  SpO2: 92% (22 Jan 2024 11:55) (92% - 98%)    Parameters below as of 22 Jan 2024 11:55  Patient On (Oxygen Delivery Method): room air        PHYSICAL EXAM:  GENERAL: NAD, on RA breathing well and normal O2 sat while sitting up  HEENT: moist mucous membranes, anicteric  CHEST/LUNG: Decreased respiratory sounds in bases with clear sounds b/l upper lung fields, no rales, wheezes, or rhonchi appreciated  HEART:  RRR, S1, S2, +murmur   ABDOMEN:  BS+, soft, nontender, nondistended  MUSCULOSKELETAL: severe LE pitting edema b/l with blistering/weeping edema on left LE, no cyanosis, or calf tenderness.  NEUROLOGIC: answers questions and follows commands appropriately    LABS:                        13.2   5.74  )-----------( 162      ( 22 Jan 2024 08:25 )             42.0     CBC Full  -  ( 22 Jan 2024 08:25 )  WBC Count : 5.74 K/uL  Hemoglobin : 13.2 g/dL  Hematocrit : 42.0 %  Platelet Count - Automated : 162 K/uL  Mean Cell Volume : 92.3 fl  Mean Cell Hemoglobin : 29.0 pg  Mean Cell Hemoglobin Concentration : 31.4 gm/dL  Auto Neutrophil # : x  Auto Lymphocyte # : x  Auto Monocyte # : x  Auto Eosinophil # : x  Auto Basophil # : x  Auto Neutrophil % : x  Auto Lymphocyte % : x  Auto Monocyte % : x  Auto Eosinophil % : x  Auto Basophil % : x    22 Jan 2024 08:25    139    |  100    |  16     ----------------------------<  129    3.4     |  34     |  0.72     Ca    9.0        22 Jan 2024 08:25  Phos  3.9       22 Jan 2024 08:25  Mg     1.5       22 Jan 2024 08:25      PT/INR - ( 20 Jan 2024 13:30 )   PT: 20.5 sec;   INR: 1.78 ratio         PTT - ( 20 Jan 2024 13:30 )  PTT:37.5 sec  Urinalysis Basic - ( 22 Jan 2024 08:25 )    Color: x / Appearance: x / SG: x / pH: x  Gluc: 129 mg/dL / Ketone: x  / Bili: x / Urobili: x   Blood: x / Protein: x / Nitrite: x   Leuk Esterase: x / RBC: x / WBC x   Sq Epi: x / Non Sq Epi: x / Bacteria: x      CAPILLARY BLOOD GLUCOSE      POCT Blood Glucose.: 133 mg/dL (22 Jan 2024 12:21)  POCT Blood Glucose.: 194 mg/dL (22 Jan 2024 10:02)  POCT Blood Glucose.: 121 mg/dL (22 Jan 2024 08:02)  POCT Blood Glucose.: 131 mg/dL (21 Jan 2024 20:57)  POCT Blood Glucose.: 248 mg/dL (21 Jan 2024 16:18)  POCT Blood Glucose.: 252 mg/dL (21 Jan 2024 13:49)        Culture - Blood (collected 01-20-24 @ 13:30)  Source: .Blood Blood-Peripheral  Preliminary Report (01-21-24 @ 22:02):    No growth at 24 hours    Culture - Blood (collected 01-20-24 @ 13:25)  Source: .Blood Blood-Peripheral  Preliminary Report (01-21-24 @ 22:02):    No growth at 24 hours        RADIOLOGY & ADDITIONAL TESTS:    Personally reviewed.     Consultant(s) Notes Reviewed:  [x] YES  [ ] NO     Patient is a 73y old  Female who presents with a chief complaint of worsening LE edema with weeping edema and worsening SOB.      INTERVAL HPI/OVERNIGHT EVENTS: Patient seen and examined at bedside. No acute overnight events occurred. Patient endorses improving B/L LE swelling and chest pressure. Endorses SALCIDO and still with some SOB at rest and sitting up in bed when she speaks for a while. States that she has had depression since 2019 and believes that it has affected her ability to take care of herself. Denies SI. Denies fevers, chills, headache, lightheadedness, abdominal pain, n/v/d/c.    MEDICATIONS  (STANDING):  carvedilol 12.5 milliGRAM(s) Oral every 12 hours  clotrimazole 1% Cream 1 Application(s) Topical two times a day  dextrose 5%. 1000 milliLiter(s) (50 mL/Hr) IV Continuous <Continuous>  dextrose 5%. 1000 milliLiter(s) (100 mL/Hr) IV Continuous <Continuous>  dextrose 5%. 1000 milliLiter(s) (50 mL/Hr) IV Continuous <Continuous>  dextrose 5%. 1000 milliLiter(s) (100 mL/Hr) IV Continuous <Continuous>  dextrose 5%. 1000 milliLiter(s) (50 mL/Hr) IV Continuous <Continuous>  dextrose 50% Injectable 25 Gram(s) IV Push once  dextrose 50% Injectable 25 Gram(s) IV Push once  dextrose 50% Injectable 12.5 Gram(s) IV Push once  dextrose 50% Injectable 25 Gram(s) IV Push once  dextrose 50% Injectable 12.5 Gram(s) IV Push once  digoxin     Tablet 250 MICROGram(s) Oral daily  furosemide   Injectable 40 milliGRAM(s) IV Push two times a day  glucagon  Injectable 1 milliGRAM(s) IntraMuscular once  glucagon  Injectable 1 milliGRAM(s) IntraMuscular once  insulin lispro (ADMELOG) corrective regimen sliding scale   SubCutaneous three times a day before meals  insulin lispro (ADMELOG) corrective regimen sliding scale   SubCutaneous at bedtime  insulin regular (concentrated) human recombinant U-500 50 Unit(s) SubCutaneous two times a day with meals  levothyroxine 125 MICROGram(s) Oral daily  losartan 25 milliGRAM(s) Oral daily  potassium chloride    Tablet ER 40 milliEquivalent(s) Oral every 4 hours  rivaroxaban 20 milliGRAM(s) Oral with dinner    MEDICATIONS  (PRN):  acetaminophen     Tablet .. 650 milliGRAM(s) Oral every 6 hours PRN Mild Pain (1 - 3)  dextrose Oral Gel 15 Gram(s) Oral once PRN Blood Glucose LESS THAN 70 milliGRAM(s)/deciliter  zolpidem 5 milliGRAM(s) Oral at bedtime PRN Insomnia  zolpidem 5 milliGRAM(s) Oral at bedtime PRN Insomnia      Allergies    penicillin (Short breath; Rash)  statins (Vomiting)  vancomycin (Short breath; Rash)  erythromycin (Rash)    Intolerances        REVIEW OF SYSTEMS:  CONSTITUTIONAL: No fever or chills  HEENT:  No headache, no sore throat  RESPIRATORY: No cough, wheezing; +occasional shortness of breath at rest with prolonged talking; +SALCIDO  CARDIOVASCULAR: No chest pain, palpitations  GASTROINTESTINAL: No abd pain, nausea, vomiting, or diarrhea  GENITOURINARY: No dysuria  NEUROLOGICAL: no focal weakness or dizziness  MUSCULOSKELETAL: leg swelling with weeping edema (improving); no myalgias     Vital Signs Last 24 Hrs  T(C): 37.1 (22 Jan 2024 11:55), Max: 37.1 (22 Jan 2024 11:55)  T(F): 98.7 (22 Jan 2024 11:55), Max: 98.7 (22 Jan 2024 11:55)  HR: 61 (22 Jan 2024 04:24) (61 - 76)  BP: 160/61 (22 Jan 2024 11:55) (160/61 - 182/79)  BP(mean): --  RR: 18 (22 Jan 2024 11:55) (18 - 19)  SpO2: 92% (22 Jan 2024 11:55) (92% - 98%)    Parameters below as of 22 Jan 2024 11:55  Patient On (Oxygen Delivery Method): room air        PHYSICAL EXAM:  GENERAL: NAD, on RA breathing well and normal O2 sat while sitting up  HEENT: moist mucous membranes, anicteric  CHEST/LUNG: Decreased respiratory sounds in bases with clear sounds b/l upper lung fields, no rales, wheezes, or rhonchi appreciated  HEART:  RRR, S1, S2, +murmur   ABDOMEN:  BS+, soft, nontender, nondistended  MUSCULOSKELETAL: severe LE pitting edema b/l with blistering/weeping edema on left LE, no cyanosis, or calf tenderness.  NEUROLOGIC: answers questions and follows commands appropriately    LABS:                        13.2   5.74  )-----------( 162      ( 22 Jan 2024 08:25 )             42.0     CBC Full  -  ( 22 Jan 2024 08:25 )  WBC Count : 5.74 K/uL  Hemoglobin : 13.2 g/dL  Hematocrit : 42.0 %  Platelet Count - Automated : 162 K/uL  Mean Cell Volume : 92.3 fl  Mean Cell Hemoglobin : 29.0 pg  Mean Cell Hemoglobin Concentration : 31.4 gm/dL  Auto Neutrophil # : x  Auto Lymphocyte # : x  Auto Monocyte # : x  Auto Eosinophil # : x  Auto Basophil # : x  Auto Neutrophil % : x  Auto Lymphocyte % : x  Auto Monocyte % : x  Auto Eosinophil % : x  Auto Basophil % : x    22 Jan 2024 08:25    139    |  100    |  16     ----------------------------<  129    3.4     |  34     |  0.72     Ca    9.0        22 Jan 2024 08:25  Phos  3.9       22 Jan 2024 08:25  Mg     1.5       22 Jan 2024 08:25      PT/INR - ( 20 Jan 2024 13:30 )   PT: 20.5 sec;   INR: 1.78 ratio         PTT - ( 20 Jan 2024 13:30 )  PTT:37.5 sec  Urinalysis Basic - ( 22 Jan 2024 08:25 )    Color: x / Appearance: x / SG: x / pH: x  Gluc: 129 mg/dL / Ketone: x  / Bili: x / Urobili: x   Blood: x / Protein: x / Nitrite: x   Leuk Esterase: x / RBC: x / WBC x   Sq Epi: x / Non Sq Epi: x / Bacteria: x      CAPILLARY BLOOD GLUCOSE      POCT Blood Glucose.: 133 mg/dL (22 Jan 2024 12:21)  POCT Blood Glucose.: 194 mg/dL (22 Jan 2024 10:02)  POCT Blood Glucose.: 121 mg/dL (22 Jan 2024 08:02)  POCT Blood Glucose.: 131 mg/dL (21 Jan 2024 20:57)  POCT Blood Glucose.: 248 mg/dL (21 Jan 2024 16:18)  POCT Blood Glucose.: 252 mg/dL (21 Jan 2024 13:49)        Culture - Blood (collected 01-20-24 @ 13:30)  Source: .Blood Blood-Peripheral  Preliminary Report (01-21-24 @ 22:02):    No growth at 24 hours    Culture - Blood (collected 01-20-24 @ 13:25)  Source: .Blood Blood-Peripheral  Preliminary Report (01-21-24 @ 22:02):    No growth at 24 hours        RADIOLOGY & ADDITIONAL TESTS:    Personally reviewed.     Consultant(s) Notes Reviewed:  [x] YES  [ ] NO

## 2024-01-23 LAB
ANION GAP SERPL CALC-SCNC: 2 MMOL/L — LOW (ref 5–17)
BUN SERPL-MCNC: 18 MG/DL — SIGNIFICANT CHANGE UP (ref 7–23)
CALCIUM SERPL-MCNC: 9 MG/DL — SIGNIFICANT CHANGE UP (ref 8.5–10.1)
CHLORIDE SERPL-SCNC: 102 MMOL/L — SIGNIFICANT CHANGE UP (ref 96–108)
CO2 SERPL-SCNC: 34 MMOL/L — HIGH (ref 22–31)
CREAT SERPL-MCNC: 0.8 MG/DL — SIGNIFICANT CHANGE UP (ref 0.5–1.3)
EGFR: 77 ML/MIN/1.73M2 — SIGNIFICANT CHANGE UP
GLUCOSE SERPL-MCNC: 168 MG/DL — HIGH (ref 70–99)
HCT VFR BLD CALC: 41.6 % — SIGNIFICANT CHANGE UP (ref 34.5–45)
HGB BLD-MCNC: 13.3 G/DL — SIGNIFICANT CHANGE UP (ref 11.5–15.5)
MAGNESIUM SERPL-MCNC: 1.8 MG/DL — SIGNIFICANT CHANGE UP (ref 1.6–2.6)
MCHC RBC-ENTMCNC: 29.4 PG — SIGNIFICANT CHANGE UP (ref 27–34)
MCHC RBC-ENTMCNC: 32 GM/DL — SIGNIFICANT CHANGE UP (ref 32–36)
MCV RBC AUTO: 91.8 FL — SIGNIFICANT CHANGE UP (ref 80–100)
NRBC # BLD: 0 /100 WBCS — SIGNIFICANT CHANGE UP (ref 0–0)
PHOSPHATE SERPL-MCNC: 4.1 MG/DL — SIGNIFICANT CHANGE UP (ref 2.5–4.5)
PLATELET # BLD AUTO: 155 K/UL — SIGNIFICANT CHANGE UP (ref 150–400)
POTASSIUM SERPL-MCNC: 4 MMOL/L — SIGNIFICANT CHANGE UP (ref 3.5–5.3)
POTASSIUM SERPL-SCNC: 4 MMOL/L — SIGNIFICANT CHANGE UP (ref 3.5–5.3)
RBC # BLD: 4.53 M/UL — SIGNIFICANT CHANGE UP (ref 3.8–5.2)
RBC # FLD: 15 % — HIGH (ref 10.3–14.5)
SODIUM SERPL-SCNC: 138 MMOL/L — SIGNIFICANT CHANGE UP (ref 135–145)
WBC # BLD: 6.65 K/UL — SIGNIFICANT CHANGE UP (ref 3.8–10.5)
WBC # FLD AUTO: 6.65 K/UL — SIGNIFICANT CHANGE UP (ref 3.8–10.5)

## 2024-01-23 PROCEDURE — 99233 SBSQ HOSP IP/OBS HIGH 50: CPT | Mod: GC

## 2024-01-23 PROCEDURE — 99232 SBSQ HOSP IP/OBS MODERATE 35: CPT

## 2024-01-23 RX ORDER — FUROSEMIDE 40 MG
40 TABLET ORAL
Refills: 0 | Status: DISCONTINUED | OUTPATIENT
Start: 2024-01-24 | End: 2024-01-25

## 2024-01-23 RX ORDER — LOSARTAN POTASSIUM 100 MG/1
25 TABLET, FILM COATED ORAL ONCE
Refills: 0 | Status: COMPLETED | OUTPATIENT
Start: 2024-01-23 | End: 2024-01-23

## 2024-01-23 RX ORDER — MUPIROCIN 20 MG/G
1 OINTMENT TOPICAL
Refills: 0 | Status: DISCONTINUED | OUTPATIENT
Start: 2024-01-23 | End: 2024-01-25

## 2024-01-23 RX ORDER — LOSARTAN POTASSIUM 100 MG/1
75 TABLET, FILM COATED ORAL DAILY
Refills: 0 | Status: DISCONTINUED | OUTPATIENT
Start: 2024-01-24 | End: 2024-01-24

## 2024-01-23 RX ORDER — MAGNESIUM OXIDE 400 MG ORAL TABLET 241.3 MG
400 TABLET ORAL ONCE
Refills: 0 | Status: DISCONTINUED | OUTPATIENT
Start: 2024-01-23 | End: 2024-01-25

## 2024-01-23 RX ORDER — MAGNESIUM SULFATE 500 MG/ML
2 VIAL (ML) INJECTION ONCE
Refills: 0 | Status: COMPLETED | OUTPATIENT
Start: 2024-01-23 | End: 2024-01-23

## 2024-01-23 RX ORDER — LOSARTAN POTASSIUM 100 MG/1
75 TABLET, FILM COATED ORAL ONCE
Refills: 0 | Status: COMPLETED | OUTPATIENT
Start: 2024-01-23 | End: 2024-01-23

## 2024-01-23 RX ORDER — POTASSIUM CHLORIDE 20 MEQ
40 PACKET (EA) ORAL ONCE
Refills: 0 | Status: DISCONTINUED | OUTPATIENT
Start: 2024-01-23 | End: 2024-01-25

## 2024-01-23 RX ADMIN — Medication 1: at 08:25

## 2024-01-23 RX ADMIN — Medication 125 MICROGRAM(S): at 05:16

## 2024-01-23 RX ADMIN — INSULIN HUMAN 50 UNIT(S): 100 INJECTION, SOLUTION SUBCUTANEOUS at 17:06

## 2024-01-23 RX ADMIN — Medication 650 MILLIGRAM(S): at 18:19

## 2024-01-23 RX ADMIN — Medication 250 MICROGRAM(S): at 05:16

## 2024-01-23 RX ADMIN — LOSARTAN POTASSIUM 50 MILLIGRAM(S): 100 TABLET, FILM COATED ORAL at 05:17

## 2024-01-23 RX ADMIN — CARVEDILOL PHOSPHATE 12.5 MILLIGRAM(S): 80 CAPSULE, EXTENDED RELEASE ORAL at 05:16

## 2024-01-23 RX ADMIN — CARVEDILOL PHOSPHATE 12.5 MILLIGRAM(S): 80 CAPSULE, EXTENDED RELEASE ORAL at 17:05

## 2024-01-23 RX ADMIN — Medication 40 MILLIGRAM(S): at 05:17

## 2024-01-23 RX ADMIN — Medication 1 APPLICATION(S): at 05:20

## 2024-01-23 RX ADMIN — Medication 40 MILLIGRAM(S): at 13:04

## 2024-01-23 RX ADMIN — ZOLPIDEM TARTRATE 5 MILLIGRAM(S): 10 TABLET ORAL at 23:03

## 2024-01-23 RX ADMIN — Medication 650 MILLIGRAM(S): at 17:05

## 2024-01-23 RX ADMIN — Medication 1 APPLICATION(S): at 17:06

## 2024-01-23 RX ADMIN — LOSARTAN POTASSIUM 75 MILLIGRAM(S): 100 TABLET, FILM COATED ORAL at 23:02

## 2024-01-23 RX ADMIN — RIVAROXABAN 20 MILLIGRAM(S): KIT at 17:06

## 2024-01-23 RX ADMIN — MUPIROCIN 1 APPLICATION(S): 20 OINTMENT TOPICAL at 17:53

## 2024-01-23 RX ADMIN — INSULIN HUMAN 50 UNIT(S): 100 INJECTION, SOLUTION SUBCUTANEOUS at 09:08

## 2024-01-23 RX ADMIN — Medication 1: at 12:48

## 2024-01-23 RX ADMIN — Medication 25 GRAM(S): at 12:29

## 2024-01-23 NOTE — CARE COORDINATION ASSESSMENT. - NSDCPLANSERVICES_GEN_ALL_CORE
Pt is an alert and oriented female admitted for weeping edema and leg wounds. Pt was seen by physical therapy and recommended for subacute rehab. Discharge planning packet provided and subacute rehab choices offered. Sw will continue to remain available./Subacute Rehabilitation

## 2024-01-23 NOTE — PROGRESS NOTE ADULT - PROBLEM SELECTOR PLAN 5
Chronic.  A1c 8.8%on admission.   - Pt at home on Humulin R U-500, with 80 U AM and 80U PM.   - will c/w pt's home insulin that she brought from home, but at lower dose for now -- Continue 50U AM and PM for now as pt is eating less in the hospital and FSG in the 100s  - decrease to low dose ISS pre-meal; d/c QHS sliding scale  - Diabetic diet, Accucheck and hypoglycemia protocol.   - Diabetes NP consult (Weil), recs appreciated Chronic.  A1c 8.8%on admission.   - Pt at home on Humulin R U-500, with 80 U AM and 80U PM.   - will c/w pt's home insulin that she brought from home, but at lower dose for now -- Continue 50U AM and PM for now as pt is eating less in the hospital and FSG in the 100s  - BGs lower this AM, will D/C SSI  - Diabetic diet, Accucheck and hypoglycemia protocol.   - Diabetes NP consult (Weil), recs appreciated

## 2024-01-23 NOTE — PROGRESS NOTE ADULT - PROBLEM SELECTOR PLAN 3
Chronic HTN  - hypertensive urgency with SBP in 180s  - Pt was outpatient on HCTZ 25 mg qd and was discontinued 2 weeks ago by cardio and started Lasix.   - Persistent elevated BP since admission  - Continue Lasix 40mg IV BID  - Continue home coreg  - started on losartan -- will titrate up to 50mg po daily  - Monitor BP Chronic HTN  - hypertensive urgency with SBP in 180s at times  - Pt was outpatient on HCTZ 25 mg qd and was discontinued 2 weeks ago by cardio and started Lasix.   - Persistent elevated BP since admission  - Continue Lasix 40mg IV BID today --switching to oral lasix tomorrow  - Continue home coreg  - Continue losartan -- titrate up to 75mg po daily (will give additional 25mg tonight as pt received 50mg in AM)  - Monitor BP

## 2024-01-23 NOTE — PROGRESS NOTE ADULT - SUBJECTIVE AND OBJECTIVE BOX
[facility] -- Josue Moore, Kenneth Gonzalez Savella, Goodger, Cohen: Office # [phone]    Follow Up:  LE edema     Subjective/Observations: Patient seen and examined. Patient awake, alert, resting in bed. No complaints of chest pain, dyspnea, palpitations or dizziness. No signs of orthopnea or PND. Tolerating room air. + LE edema, improving     REVIEW OF SYSTEMS: All other review of systems are negative unless indicated above    PAST MEDICAL & SURGICAL HISTORY:  Hypothyroid      DM (diabetes mellitus)      HTN (hypertension)      Atrial fibrillation and flutter  s/p ablation      Bradycardia  s/p MDT PPM      H/O pulmonary hypertension      H/O hyperparathyroidism      H/O cardiomyopathy      Cardiac pacemaker      H/O cardiac radiofrequency ablation      H/O carpal tunnel repair      History of parathyroid surgery      MEDICATIONS  (STANDING):  carvedilol 12.5 milliGRAM(s) Oral every 12 hours  clotrimazole 1% Cream 1 Application(s) Topical two times a day  dextrose 5%. 1000 milliLiter(s) (50 mL/Hr) IV Continuous <Continuous>  dextrose 5%. 1000 milliLiter(s) (50 mL/Hr) IV Continuous <Continuous>  dextrose 5%. 1000 milliLiter(s) (100 mL/Hr) IV Continuous <Continuous>  dextrose 5%. 1000 milliLiter(s) (100 mL/Hr) IV Continuous <Continuous>  dextrose 5%. 1000 milliLiter(s) (50 mL/Hr) IV Continuous <Continuous>  dextrose 50% Injectable 25 Gram(s) IV Push once  dextrose 50% Injectable 12.5 Gram(s) IV Push once  dextrose 50% Injectable 25 Gram(s) IV Push once  dextrose 50% Injectable 25 Gram(s) IV Push once  dextrose 50% Injectable 12.5 Gram(s) IV Push once  digoxin     Tablet 250 MICROGram(s) Oral daily  furosemide   Injectable 40 milliGRAM(s) IV Push two times a day  glucagon  Injectable 1 milliGRAM(s) IntraMuscular once  glucagon  Injectable 1 milliGRAM(s) IntraMuscular once  insulin lispro (ADMELOG) corrective regimen sliding scale   SubCutaneous three times a day before meals  insulin regular (concentrated) human recombinant U-500 50 Unit(s) SubCutaneous two times a day with meals  levothyroxine 125 MICROGram(s) Oral daily  losartan 50 milliGRAM(s) Oral daily  rivaroxaban 20 milliGRAM(s) Oral with dinner    MEDICATIONS  (PRN):  acetaminophen     Tablet .. 650 milliGRAM(s) Oral every 6 hours PRN Mild Pain (1 - 3)  dextrose Oral Gel 15 Gram(s) Oral once PRN Blood Glucose LESS THAN 70 milliGRAM(s)/deciliter  zolpidem 5 milliGRAM(s) Oral at bedtime PRN Insomnia  zolpidem 5 milliGRAM(s) Oral at bedtime PRN Insomnia    Allergies  penicillin (Short breath; Rash)  statins (Vomiting)  vancomycin (Short breath; Rash)  erythromycin (Rash)    Vital Signs Last 24 Hrs  T(C): 36.7 (23 Jan 2024 04:51), Max: 37.1 (22 Jan 2024 11:55)  T(F): 98.1 (23 Jan 2024 04:51), Max: 98.7 (22 Jan 2024 11:55)  HR: 63 (23 Jan 2024 04:51) (63 - 63)  BP: 170/90 (23 Jan 2024 04:51) (160/61 - 170/90)  BP(mean): --  RR: 18 (23 Jan 2024 04:51) (18 - 18)  SpO2: 93% (23 Jan 2024 04:51) (92% - 93%)    Parameters below as of 23 Jan 2024 04:51  Patient On (Oxygen Delivery Method): room air      I&O's Summary    22 Jan 2024 07:01  -  23 Jan 2024 07:00  --------------------------------------------------------  IN: 550 mL / OUT: 3700 mL / NET: -3150 mL          TELE: AP 60s   PHYSICAL EXAM:  Constitutional: NAD, awake and alert, Obese   HEENT: Moist Mucous Membranes, Anicteric  Pulmonary: Non-labored, breath sounds are clear bilaterally, Diminished at bases No wheezing, rales or rhonchi  Cardiovascular: Regular, S1 and S2, + murmurs, No rubs, gallops or clicks  Gastrointestinal:  soft, nontender, nondistended   Lymph: +1-2 peripheral edema. No lymphadenopathy.   Skin: No visible rashes or ulcers.  Psych:  Mood & affect appropriate      LABS: All Labs Reviewed:                        13.3   6.65  )-----------( 155      ( 23 Jan 2024 07:48 )             41.6                         13.2   5.74  )-----------( 162      ( 22 Jan 2024 08:25 )             42.0                         13.4   5.24  )-----------( 154      ( 21 Jan 2024 06:28 )             42.7     23 Jan 2024 07:48    138    |  102    |  18     ----------------------------<  168    4.0     |  34     |  0.80   22 Jan 2024 18:10    141    |  102    |  19     ----------------------------<  114    3.6     |  36     |  0.75   22 Jan 2024 08:25    139    |  100    |  16     ----------------------------<  129    3.4     |  34     |  0.72     Ca    9.0        23 Jan 2024 07:48  Ca    9.1        22 Jan 2024 18:10  Ca    9.0        22 Jan 2024 08:25  Phos  4.1       23 Jan 2024 07:48  Phos  3.9       22 Jan 2024 08:25  Mg     1.8       23 Jan 2024 07:48  Mg     1.5       22 Jan 2024 08:25    TPro  7.5    /  Alb  3.4    /  TBili  1.0    /  DBili  x      /  AST  26     /  ALT  19     /  AlkPhos  114    21 Jan 2024 06:28  TPro  7.1    /  Alb  3.1    /  TBili  0.9    /  DBili  x      /  AST  27     /  ALT  19     /  AlkPhos  116    20 Jan 2024 13:30     Troponin I, High Sensitivity Result: 50.7 ng/L (01-20-24 @ 13:30)  Cholesterol: 124 mg/dL (01-21-24 @ 06:28)  HDL Cholesterol: 43 mg/dL (01-21-24 @ 06:28)  Triglycerides, Serum: 90 mg/dL (01-21-24 @ 06:28)  Lactate, Blood: 1.1 mmol/L (01-20-24 @ 13:30)    12 Lead ECG:   Ventricular Rate 61 BPM    QRS Duration 168 ms    Q-T Interval 456 ms    QTC Calculation(Bazett) 459 ms    R Axis -54 degrees    T Axis 111 degrees    Diagnosis Line A-paced  Wide QRS rhythm  Right bundle branch block  Left anterior fascicular block  *** Bifascicular block ***  Left ventricular hypertrophy with repolarization abnormality ( R in aVL )  Abnormal ECG  No previous ECGs available  Confirmed by Shahrzad Drake ([id number]) on 1/20/2024 2:51:23 PM (01-20-24 @ 13:08)      TRANSTHORACIC ECHOCARDIOGRAM REPORT  ________________________________________________________________________________                                      _______       Pt. Name:       ROSITA GUTIERREZ Study Date:    1/22/2024  MRN:            [id number]          YOB: 1950  Accession #:    [id number]         Age:           73 years  Account#:       [id number]        Gender:        F  Heart Rate:                       Height:        ( )  Rhythm:                           Weight:   220.46 lb (100.00 kg)  Blood Pressure: 160/61 mmHg       BSA/BMI:       2.15 m² /  ________________________________________________________________________________________  Referring Physician:    [id number] Lexx Cuenca  Interpreting Physician: Serafin Cooper  Primary Sonographer:    Bernabe Heredia    CPT:               ECHO TTE WO CON COMP W DOPP - 74083.m  Indication(s):     Dyspnea, unspecified - R06.00  Procedure:         Transthoracic echocardiogram with 2-D, M-mode and complete          spectral and color flow Doppler.  Ordering Location: [facility]  Admission Status:  Inpatient  Study Information: Image quality for this study is technically difficult.    _______________________________________________________________________________________     CONCLUSIONS:      1. Technically difficult image quality.   2. Left ventricular systolic function is mildly decreased with an ejection fraction visually estimated at 45 to 50 %. Global left ventricular hypokinesis.   3. Based on visualassessment, the right ventricle appears mildly enlarged. borderline reduced systolic function.   4. Device lead is visualized in the right heart.   5. The left atrium is moderately dilated.   6. The right atrium is dilated in size.   7. Symmetric mitral valve leaflet tethering.   8. Mild mitral regurgitation.   9. Trileaflet aortic valve with reduced systolic excursion. There is calcification of the aortic valve leaflets. moderate aortic stenosis.  10. The DEVYN is estimated at 1.1sqcm.  11. Moderate to severe tricuspid regurgitation.  12. Estimated pulmonary artery systolic pressure is 78 mmHg, consistent with severe pulmonary hypertension.  13. The inferior vena cava is dilated measuring 2.67 cm in diameter, (dilated >2.1cm) with abnormal inspiratory collapse (abnormal <50%) consistent with elevated right atrial pressure (~15, range 10-20mmHg).    ________________________________________________________________________________________  FINDINGS:     Left Ventricle:  Left ventricular systolic function is mildly decreased with an ejection fraction visually estimated at 45 to 50%. There is global left ventricular hypokinesis.     Right Ventricle:  Based on visual assessment, the right ventricle appears mildly enlarged. Borderline reducedsystolic function. Tricuspid annular plane systolic excursion (TAPSE) is 2.3 cm (normal >=1.7 cm). A device lead is visualized in the right heart.     Left Atrium:  The left atrium is moderately dilated with an indexed volume of 45.99 ml/m².     Right Atrium:  The right atrium is dilated in size.     Aortic Valve:  The aortic valve appears trileaflet with reduced systolic excursion. There is calcification of the aortic valve leaflets. There is moderate aortic stenosis. The peak transaortic velocity is 2.61 m/s, peak transaortic gradient is 27.2 mmHg and mean transaortic gradient is 13.0 mmHg with an LVOT/aortic valve VTI ratio of 0.31. The aortic valve area is estimated at 1.09 cm² by the continuity equation. The DEVYN is estimated at 1.1sqcm. There is trace aortic regurgitation.     Mitral Valve:  There is calcification of the mitral valve annulus. Mitral valve leaflets are diffusely calcified. There is symmetric leaflet tethering. There is mild mitral regurgitation.     Tricuspid Valve:  Structurally normal tricuspid valve with normal leaflet excursion. There is moderate to severe tricuspid regurgitation. Estimated pulmonary artery systolic pressure is 78 mmHg, consistent with severe pulmonary hypertension.     Pulmonic Valve:  The pulmonic valve was not well visualized. There is mild pulmonic regurgitation.     Aorta:  The aortic root at the sinuses of Valsalva is normal in size, measuring 3.00 cm (indexed 1.39 cm/m²). The ascending aorta diameter is normal in size, measuring 2.60 cm (indexed 1.21 cm/m²).     Systemic Veins:  The inferior vena cava is dilated measuring 2.67 cm in diameter, (dilated >2.1cm) with abnormal inspiratory collapse (abnormal <50%) consistent with elevated right atrial pressure (~15, range 10-20mmHg).  ____________________________________________________________________  QUANTITATIVE DATA:  Left Ventricle Measurements: (Indexed to BSA)     IVSd (2D):   1.4 cm  LVPWd (2D):  1.3 cm  LVIDd (2D):  5.4 cm  LVIDs (2D):  4.5 cm  LV Mass:     314 g  145.8g/m²  Visualized LV EF%: 45 to 50%     MV E Vmax:    1.71 m/s  MV A Vmax:    0.75 m/s  MV E/A:       2.27  e' lateral:   6.42 cm/s  e' medial:    5.87 cm/s  E/e' lateral: 26.64  E/e' medial:  29.13  E/e' Average: 27.83  MV DT:        161 msec    Aorta Measurements: (normal range) (Indexed to BSA)     Sinuses of Valsalva: 3.00 cm (2.7 - 3.3 cm)  Ao Asc prox:         2.60 cm       Left Atrium Measurements: (Indexed to BSA)  LA Diam 2D: 5.00 cm    Right Ventricle Measurements:     TAPSE:            2.3 cm  RV Base (RVID1):  4.3 cm  RV Mid (RVID2):   3.2 cm  RV Major (RVID3): 7.5 cm       LVOT / RVOT/ Qp/Qs Data: (Indexed to BSA)  LVOT Diameter: 2.10 cm  LVOT Vmax:     1.02 m/s  LVOT VTI:      17.80 cm  LVOT SV:       61.7 ml  28.61 ml/m²    Aortic Valve Measurements:  AV Vmax:                2.6 m/s  AV Peak Gradient:       27.2 mmHg  AV Mean Gradient:       13.0 mmHg  AV VTI:                 56.6 cm  AV VTI Ratio:           0.31  AoV EOA, Contin:        1.09 cm²  AoV EOA, Contin i:      0.51 cm²/m²  AoV Dimensionless Index 0.31    Mitral Valve Measurements:     MV E Vmax: 1.7 m/s         MR Vmax:          5.05 m/s  MV A Vmax: 0.8 m/s         MR Peak Gradient: 102.0 mmHg  MV E/A:    2.3       Tricuspid Valve Measurements:     TR Vmax:      4.0 m/s  TR Peak Gradient: 62.7 mmHg  RA Pressure:      15 mmHg  PASP:             78 mmHg    ________________________________________________________________________________________  Electronically signed on 1/22/2024 at 5:39:34 PM by Serafin Cooper         *** Final ***

## 2024-01-23 NOTE — PROGRESS NOTE ADULT - SUBJECTIVE AND OBJECTIVE BOX
Patient is a 73y old  Female who presents with a chief complaint of b/l LE wounds (22 Jan 2024 15:25)      INTERVAL HPI/OVERNIGHT EVENTS: Patient seen and examined at bedside. No overnight events occurred. Patient has no complaints at this time. Reports improved SOB and chest pressure. Notices remarkably decreased bilateral leg swelling and is happy with her progress. Feels better overall but notes that her symptomatology worsens when she gets "worked up". When told her recent TTE results she reports they are similar to prior. Denies fevers, chills, headache, lightheadedness, abdominal pain, n/v/d/c.    MEDICATIONS  (STANDING):  carvedilol 12.5 milliGRAM(s) Oral every 12 hours  clotrimazole 1% Cream 1 Application(s) Topical two times a day  dextrose 5%. 1000 milliLiter(s) (50 mL/Hr) IV Continuous <Continuous>  dextrose 5%. 1000 milliLiter(s) (50 mL/Hr) IV Continuous <Continuous>  dextrose 5%. 1000 milliLiter(s) (100 mL/Hr) IV Continuous <Continuous>  dextrose 5%. 1000 milliLiter(s) (50 mL/Hr) IV Continuous <Continuous>  dextrose 5%. 1000 milliLiter(s) (100 mL/Hr) IV Continuous <Continuous>  dextrose 50% Injectable 25 Gram(s) IV Push once  dextrose 50% Injectable 25 Gram(s) IV Push once  dextrose 50% Injectable 25 Gram(s) IV Push once  dextrose 50% Injectable 12.5 Gram(s) IV Push once  dextrose 50% Injectable 12.5 Gram(s) IV Push once  digoxin     Tablet 250 MICROGram(s) Oral daily  furosemide   Injectable 40 milliGRAM(s) IV Push two times a day  glucagon  Injectable 1 milliGRAM(s) IntraMuscular once  glucagon  Injectable 1 milliGRAM(s) IntraMuscular once  insulin lispro (ADMELOG) corrective regimen sliding scale   SubCutaneous three times a day before meals  insulin regular (concentrated) human recombinant U-500 50 Unit(s) SubCutaneous two times a day with meals  levothyroxine 125 MICROGram(s) Oral daily  losartan 50 milliGRAM(s) Oral daily  rivaroxaban 20 milliGRAM(s) Oral with dinner    MEDICATIONS  (PRN):  acetaminophen     Tablet .. 650 milliGRAM(s) Oral every 6 hours PRN Mild Pain (1 - 3)  dextrose Oral Gel 15 Gram(s) Oral once PRN Blood Glucose LESS THAN 70 milliGRAM(s)/deciliter  zolpidem 5 milliGRAM(s) Oral at bedtime PRN Insomnia  zolpidem 5 milliGRAM(s) Oral at bedtime PRN Insomnia      Allergies    penicillin (Short breath; Rash)  statins (Vomiting)  vancomycin (Short breath; Rash)  erythromycin (Rash)    Intolerances        REVIEW OF SYSTEMS:  CONSTITUTIONAL: No fever or chills  HEENT:  No headache, no sore throat  RESPIRATORY: No cough, wheezing, or shortness of breath  CARDIOVASCULAR: No chest pain, palpitations  GASTROINTESTINAL: No abd pain, nausea, vomiting, or diarrhea  GENITOURINARY: No dysuria, frequency, or hematuria  NEUROLOGICAL: no focal weakness or dizziness  MUSCULOSKELETAL: no myalgias     Vital Signs Last 24 Hrs  T(C): 36.7 (23 Jan 2024 04:51), Max: 37.1 (22 Jan 2024 11:55)  T(F): 98.1 (23 Jan 2024 04:51), Max: 98.7 (22 Jan 2024 11:55)  HR: 63 (23 Jan 2024 04:51) (63 - 63)  BP: 170/90 (23 Jan 2024 04:51) (160/61 - 170/90)  BP(mean): --  RR: 18 (23 Jan 2024 04:51) (18 - 18)  SpO2: 93% (23 Jan 2024 04:51) (92% - 93%)    Parameters below as of 23 Jan 2024 04:51  Patient On (Oxygen Delivery Method): room air        PHYSICAL EXAM:  GENERAL: NAD  HEENT:  anicteric, moist mucous membranes  CHEST/LUNG:  CTA b/l, no rales, wheezes, or rhonchi  HEART:  RRR, S1, S2  ABDOMEN:  BS+, soft, nontender, nondistended  EXTREMITIES: no edema, cyanosis, or calf tenderness  NERVOUS SYSTEM: answers questions and follows commands appropriately    LABS:    CBC Full  -  ( 22 Jan 2024 08:25 )  WBC Count : 5.74 K/uL  Hemoglobin : 13.2 g/dL  Hematocrit : 42.0 %  Platelet Count - Automated : 162 K/uL  Mean Cell Volume : 92.3 fl  Mean Cell Hemoglobin : 29.0 pg  Mean Cell Hemoglobin Concentration : 31.4 gm/dL  Auto Neutrophil # : x  Auto Lymphocyte # : x  Auto Monocyte # : x  Auto Eosinophil # : x  Auto Basophil # : x  Auto Neutrophil % : x  Auto Lymphocyte % : x  Auto Monocyte % : x  Auto Eosinophil % : x  Auto Basophil % : x    22 Jan 2024 18:10    141    |  102    |  19     ----------------------------<  114    3.6     |  36     |  0.75     Ca    9.1        22 Jan 2024 18:10        Urinalysis Basic - ( 22 Jan 2024 18:10 )    Color: x / Appearance: x / SG: x / pH: x  Gluc: 114 mg/dL / Ketone: x  / Bili: x / Urobili: x   Blood: x / Protein: x / Nitrite: x   Leuk Esterase: x / RBC: x / WBC x   Sq Epi: x / Non Sq Epi: x / Bacteria: x      CAPILLARY BLOOD GLUCOSE      POCT Blood Glucose.: 180 mg/dL (23 Jan 2024 08:07)  POCT Blood Glucose.: 89 mg/dL (22 Jan 2024 23:54)  POCT Blood Glucose.: 69 mg/dL (22 Jan 2024 22:04)  POCT Blood Glucose.: 165 mg/dL (22 Jan 2024 16:20)  POCT Blood Glucose.: 165 mg/dL (22 Jan 2024 16:20)  POCT Blood Glucose.: 133 mg/dL (22 Jan 2024 12:21)  POCT Blood Glucose.: 194 mg/dL (22 Jan 2024 10:02)        Culture - Blood (collected 01-20-24 @ 13:30)  Source: .Blood Blood-Peripheral  Preliminary Report (01-22-24 @ 22:01):    No growth at 48 Hours    Culture - Blood (collected 01-20-24 @ 13:25)  Source: .Blood Blood-Peripheral  Preliminary Report (01-22-24 @ 22:01):    No growth at 48 Hours        RADIOLOGY & ADDITIONAL TESTS:    Personally reviewed.     Consultant(s) Notes Reviewed:  [x] YES  [ ] NO     Patient is a 73y old  Female who presents with a chief complaint of b/l LE wounds (22 Jan 2024 15:25)      INTERVAL HPI/OVERNIGHT EVENTS: Patient seen and examined at bedside. No overnight events occurred. Patient has no complaints at this time. Reports improved SOB and chest pressure. Notices remarkably decreased bilateral leg swelling and is happy with her progress. Feels better overall but notes that her symptomatology worsens when she gets "worked up". When told her recent TTE results she reports they are similar to prior. Reports that her wounds on her legs are starting to hurt. Denies fevers, chills, headache, lightheadedness, abdominal pain, n/v/d/c.    MEDICATIONS  (STANDING):  carvedilol 12.5 milliGRAM(s) Oral every 12 hours  clotrimazole 1% Cream 1 Application(s) Topical two times a day  dextrose 5%. 1000 milliLiter(s) (50 mL/Hr) IV Continuous <Continuous>  dextrose 5%. 1000 milliLiter(s) (50 mL/Hr) IV Continuous <Continuous>  dextrose 5%. 1000 milliLiter(s) (100 mL/Hr) IV Continuous <Continuous>  dextrose 5%. 1000 milliLiter(s) (50 mL/Hr) IV Continuous <Continuous>  dextrose 5%. 1000 milliLiter(s) (100 mL/Hr) IV Continuous <Continuous>  dextrose 50% Injectable 25 Gram(s) IV Push once  dextrose 50% Injectable 25 Gram(s) IV Push once  dextrose 50% Injectable 25 Gram(s) IV Push once  dextrose 50% Injectable 12.5 Gram(s) IV Push once  dextrose 50% Injectable 12.5 Gram(s) IV Push once  digoxin     Tablet 250 MICROGram(s) Oral daily  furosemide   Injectable 40 milliGRAM(s) IV Push two times a day  glucagon  Injectable 1 milliGRAM(s) IntraMuscular once  glucagon  Injectable 1 milliGRAM(s) IntraMuscular once  insulin lispro (ADMELOG) corrective regimen sliding scale   SubCutaneous three times a day before meals  insulin regular (concentrated) human recombinant U-500 50 Unit(s) SubCutaneous two times a day with meals  levothyroxine 125 MICROGram(s) Oral daily  losartan 50 milliGRAM(s) Oral daily  rivaroxaban 20 milliGRAM(s) Oral with dinner    MEDICATIONS  (PRN):  acetaminophen     Tablet .. 650 milliGRAM(s) Oral every 6 hours PRN Mild Pain (1 - 3)  dextrose Oral Gel 15 Gram(s) Oral once PRN Blood Glucose LESS THAN 70 milliGRAM(s)/deciliter  zolpidem 5 milliGRAM(s) Oral at bedtime PRN Insomnia  zolpidem 5 milliGRAM(s) Oral at bedtime PRN Insomnia      Allergies    penicillin (Short breath; Rash)  statins (Vomiting)  vancomycin (Short breath; Rash)  erythromycin (Rash)    Intolerances        REVIEW OF SYSTEMS:  CONSTITUTIONAL: No fever or chills  HEENT:  No headache, no sore throat  RESPIRATORY: No cough, wheezing; +occasional shortness of breath at rest with prolonged talking; +SALCIDO  CARDIOVASCULAR: No chest pain, palpitations  GASTROINTESTINAL: No abd pain, nausea, vomiting, or diarrhea  GENITOURINARY: No dysuria  NEUROLOGICAL: no focal weakness or dizziness  MUSCULOSKELETAL: leg swelling with weeping edema (improving); no myalgias     Vital Signs Last 24 Hrs  T(C): 36.7 (23 Jan 2024 04:51), Max: 37.1 (22 Jan 2024 11:55)  T(F): 98.1 (23 Jan 2024 04:51), Max: 98.7 (22 Jan 2024 11:55)  HR: 63 (23 Jan 2024 04:51) (63 - 63)  BP: 170/90 (23 Jan 2024 04:51) (160/61 - 170/90)  BP(mean): --  RR: 18 (23 Jan 2024 04:51) (18 - 18)  SpO2: 93% (23 Jan 2024 04:51) (92% - 93%)    Parameters below as of 23 Jan 2024 04:51  Patient On (Oxygen Delivery Method): room air        PHYSICAL EXAM:  GENERAL: NAD, on RA breathing well and normal O2 sat while sitting up  HEENT: moist mucous membranes, anicteric  CHEST/LUNG: Decreased respiratory sounds in bases with clear sounds b/l upper lung fields, no rales, wheezes, or rhonchi appreciated  HEART:  RRR, S1, S2, +murmur   ABDOMEN:  BS+, soft, nontender, nondistended  MUSCULOSKELETAL: 2+ BLLE pitting edema (improving), no cyanosis, or calf tenderness.  NEUROLOGIC: answers questions and follows commands appropriately    LABS:    CBC Full  -  ( 22 Jan 2024 08:25 )  WBC Count : 5.74 K/uL  Hemoglobin : 13.2 g/dL  Hematocrit : 42.0 %  Platelet Count - Automated : 162 K/uL  Mean Cell Volume : 92.3 fl  Mean Cell Hemoglobin : 29.0 pg  Mean Cell Hemoglobin Concentration : 31.4 gm/dL  Auto Neutrophil # : x  Auto Lymphocyte # : x  Auto Monocyte # : x  Auto Eosinophil # : x  Auto Basophil # : x  Auto Neutrophil % : x  Auto Lymphocyte % : x  Auto Monocyte % : x  Auto Eosinophil % : x  Auto Basophil % : x    22 Jan 2024 18:10    141    |  102    |  19     ----------------------------<  114    3.6     |  36     |  0.75     Ca    9.1        22 Jan 2024 18:10        Urinalysis Basic - ( 22 Jan 2024 18:10 )    Color: x / Appearance: x / SG: x / pH: x  Gluc: 114 mg/dL / Ketone: x  / Bili: x / Urobili: x   Blood: x / Protein: x / Nitrite: x   Leuk Esterase: x / RBC: x / WBC x   Sq Epi: x / Non Sq Epi: x / Bacteria: x      CAPILLARY BLOOD GLUCOSE      POCT Blood Glucose.: 180 mg/dL (23 Jan 2024 08:07)  POCT Blood Glucose.: 89 mg/dL (22 Jan 2024 23:54)  POCT Blood Glucose.: 69 mg/dL (22 Jan 2024 22:04)  POCT Blood Glucose.: 165 mg/dL (22 Jan 2024 16:20)  POCT Blood Glucose.: 165 mg/dL (22 Jan 2024 16:20)  POCT Blood Glucose.: 133 mg/dL (22 Jan 2024 12:21)  POCT Blood Glucose.: 194 mg/dL (22 Jan 2024 10:02)        Culture - Blood (collected 01-20-24 @ 13:30)  Source: .Blood Blood-Peripheral  Preliminary Report (01-22-24 @ 22:01):    No growth at 48 Hours    Culture - Blood (collected 01-20-24 @ 13:25)  Source: .Blood Blood-Peripheral  Preliminary Report (01-22-24 @ 22:01):    No growth at 48 Hours        RADIOLOGY & ADDITIONAL TESTS:    Personally reviewed.     Consultant(s) Notes Reviewed:  [x] YES  [ ] NO     Patient is a 73y old  Female who presents with a chief complaint of worsening LE edema with weeping edema and worsening SOB.      INTERVAL HPI/OVERNIGHT EVENTS: Patient seen and examined at bedside. No acute overnight events occurred. Pt reports improved SOB and chest pressure. Notices remarkably decreased bilateral leg swelling and is happy with her progress. Feels better overall, but notes that her symptomatology worsens when she gets "worked up." When told her recent TTE results she reports they are similar to prior. Reports that her wounds on her legs are mildly painful at times. Denies fevers, chills, headache, lightheadedness, abdominal pain, n/v/d/c.    MEDICATIONS  (STANDING):  carvedilol 12.5 milliGRAM(s) Oral every 12 hours  clotrimazole 1% Cream 1 Application(s) Topical two times a day  dextrose 5%. 1000 milliLiter(s) (50 mL/Hr) IV Continuous <Continuous>  dextrose 5%. 1000 milliLiter(s) (50 mL/Hr) IV Continuous <Continuous>  dextrose 5%. 1000 milliLiter(s) (100 mL/Hr) IV Continuous <Continuous>  dextrose 5%. 1000 milliLiter(s) (50 mL/Hr) IV Continuous <Continuous>  dextrose 5%. 1000 milliLiter(s) (100 mL/Hr) IV Continuous <Continuous>  dextrose 50% Injectable 25 Gram(s) IV Push once  dextrose 50% Injectable 25 Gram(s) IV Push once  dextrose 50% Injectable 25 Gram(s) IV Push once  dextrose 50% Injectable 12.5 Gram(s) IV Push once  dextrose 50% Injectable 12.5 Gram(s) IV Push once  digoxin     Tablet 250 MICROGram(s) Oral daily  furosemide   Injectable 40 milliGRAM(s) IV Push two times a day  glucagon  Injectable 1 milliGRAM(s) IntraMuscular once  glucagon  Injectable 1 milliGRAM(s) IntraMuscular once  insulin lispro (ADMELOG) corrective regimen sliding scale   SubCutaneous three times a day before meals  insulin regular (concentrated) human recombinant U-500 50 Unit(s) SubCutaneous two times a day with meals  levothyroxine 125 MICROGram(s) Oral daily  losartan 50 milliGRAM(s) Oral daily  rivaroxaban 20 milliGRAM(s) Oral with dinner    MEDICATIONS  (PRN):  acetaminophen     Tablet .. 650 milliGRAM(s) Oral every 6 hours PRN Mild Pain (1 - 3)  dextrose Oral Gel 15 Gram(s) Oral once PRN Blood Glucose LESS THAN 70 milliGRAM(s)/deciliter  zolpidem 5 milliGRAM(s) Oral at bedtime PRN Insomnia  zolpidem 5 milliGRAM(s) Oral at bedtime PRN Insomnia      Allergies    penicillin (Short breath; Rash)  statins (Vomiting)  vancomycin (Short breath; Rash)  erythromycin (Rash)    Intolerances        REVIEW OF SYSTEMS:  CONSTITUTIONAL: No fever or chills  HEENT:  No headache, no sore throat  RESPIRATORY: No cough, wheezing; no SOB at rest; +SALCIDO  CARDIOVASCULAR: No chest pain, palpitations  GASTROINTESTINAL: No abd pain, nausea, vomiting, or diarrhea  GENITOURINARY: No dysuria  NEUROLOGICAL: no focal weakness or dizziness  MUSCULOSKELETAL: leg swelling (improving); no myalgias     Vital Signs Last 24 Hrs  T(C): 36.7 (23 Jan 2024 04:51), Max: 37.1 (22 Jan 2024 11:55)  T(F): 98.1 (23 Jan 2024 04:51), Max: 98.7 (22 Jan 2024 11:55)  HR: 63 (23 Jan 2024 04:51) (63 - 63)  BP: 170/90 (23 Jan 2024 04:51) (160/61 - 170/90)  BP(mean): --  RR: 18 (23 Jan 2024 04:51) (18 - 18)  SpO2: 93% (23 Jan 2024 04:51) (92% - 93%)    Parameters below as of 23 Jan 2024 04:51  Patient On (Oxygen Delivery Method): room air        PHYSICAL EXAM:  GENERAL: NAD, on RA breathing well and normal O2 sat while sitting up, obese  HEENT: moist mucous membranes, anicteric  CHEST/LUNG: Decreased respiratory sounds in bases with clear sounds b/l upper lung fields, no rales, wheezes, or rhonchi appreciated  HEART:  RRR, S1, S2, +murmur   ABDOMEN:  BS+, soft, nontender, nondistended  MUSCULOSKELETAL: 2+ B/L LE pitting edema (improving), no cyanosis, or calf tenderness; ruptured blisters now scabbing over without surrounding warmth/tenderness  NEUROLOGIC: answers questions and follows commands appropriately    LABS:                          13.3   6.65  )-----------( 155      ( 23 Jan 2024 07:48 )             41.6     CBC Full  -  ( 23 Jan 2024 07:48 )  WBC Count : 6.65 K/uL  Hemoglobin : 13.3 g/dL  Hematocrit : 41.6 %  Platelet Count - Automated : 155 K/uL  Mean Cell Volume : 91.8 fl  Mean Cell Hemoglobin : 29.4 pg  Mean Cell Hemoglobin Concentration : 32.0 gm/dL  Auto Neutrophil # : x  Auto Lymphocyte # : x  Auto Monocyte # : x  Auto Eosinophil # : x  Auto Basophil # : x  Auto Neutrophil % : x  Auto Lymphocyte % : x  Auto Monocyte % : x  Auto Eosinophil % : x  Auto Basophil % : x    01-23    138  |  102  |  18  ----------------------------<  168<H>  4.0   |  34<H>  |  0.80    Ca    9.0      23 Jan 2024 07:48  Phos  4.1     01-23  Mg     1.8     01-23            Urinalysis Basic - ( 22 Jan 2024 18:10 )    Color: x / Appearance: x / SG: x / pH: x  Gluc: 114 mg/dL / Ketone: x  / Bili: x / Urobili: x   Blood: x / Protein: x / Nitrite: x   Leuk Esterase: x / RBC: x / WBC x   Sq Epi: x / Non Sq Epi: x / Bacteria: x      CAPILLARY BLOOD GLUCOSE      POCT Blood Glucose.: 180 mg/dL (23 Jan 2024 08:07)  POCT Blood Glucose.: 89 mg/dL (22 Jan 2024 23:54)  POCT Blood Glucose.: 69 mg/dL (22 Jan 2024 22:04)  POCT Blood Glucose.: 165 mg/dL (22 Jan 2024 16:20)  POCT Blood Glucose.: 165 mg/dL (22 Jan 2024 16:20)  POCT Blood Glucose.: 133 mg/dL (22 Jan 2024 12:21)  POCT Blood Glucose.: 194 mg/dL (22 Jan 2024 10:02)        Culture - Blood (collected 01-20-24 @ 13:30)  Source: .Blood Blood-Peripheral  Preliminary Report (01-22-24 @ 22:01):    No growth at 48 Hours    Culture - Blood (collected 01-20-24 @ 13:25)  Source: .Blood Blood-Peripheral  Preliminary Report (01-22-24 @ 22:01):    No growth at 48 Hours        RADIOLOGY & ADDITIONAL TESTS:    Personally reviewed.     Consultant(s) Notes Reviewed:  [x] YES  [ ] NO

## 2024-01-23 NOTE — PROGRESS NOTE ADULT - PROBLEM SELECTOR PLAN 1
- acute on chronic systolic CHF  - At Presentation: Pt with progressive LE edema for the past 3 weeks to groin level and increased SOB with minimal activity. Seen by her cardio outpatient about 2 weeks ago and started Lasix 20mg qd but pt was able to start it about 1 week ago with no significant improvement.  - severely volume overloaded (especially peripherally) on exam. Normal O2 sats on RA. Improving LE edema from admission.  - CXR/CCT: Signs of fluid overload noted on R>L lung parenchyma c/w CHF exacerbation vs PNA  - pBNP 1029 in the setting of obesity.   - TTE 2/9/22 LVEF 40-45%, mild con LVH, mod inferior and posterior hypokinesis, mod MR, trace AI, mild PI, mod TR, RVSP 76. Repeat TTE 1/21/24 demonstrating similar findings w/ LVEF 35% and akinetic segments.  - Continue lasix 40mg IVP BID. Monitor I&Os  - replete electrolytes -- hypokalemia and hypomagnesemia   - Continue coreg and digoxin. Digoxin level within normal range.   - started on losartan -- will titrate up to 50mg po daily  - cardio consulted (Dr. Drake group), appreciate recs - acute on chronic systolic CHF  - At Presentation: Pt with progressive LE edema for the past 3 weeks to groin level and increased SOB with minimal activity. Seen by her cardio outpatient about 2 weeks ago and started Lasix 20mg qd but pt was able to start it about 1 week ago with no significant improvement.  - severely volume overloaded (especially peripherally) on exam. Normal O2 sats on RA. Improving LE edema from admission.  - CXR/CCT: Signs of fluid overload noted on R>L lung parenchyma c/w CHF exacerbation vs PNA  - pBNP 1029 in the setting of obesity.   - TTE 2/9/22 LVEF 40-45%, mild con LVH, mod inferior and posterior hypokinesis, mod MR, trace AI, mild PI, mod TR, RVSP 76. Repeat TTE 1/21/24 demonstrating similar findings w/ LVEF 35% and akinetic segments.  - Continue lasix 40mg IVP BID. Monitor I&Os  - replete electrolytes -- hypokalemia and hypomagnesemia   - Continue coreg and digoxin. Digoxin level within normal range.   - Continue losartan -- titrate up to 50mg po daily  - cardio consulted (Dr. Drake group), appreciate recs - acute on chronic systolic CHF  - At Presentation: Pt with progressive LE edema for the past 3 weeks to groin level and increased SOB with minimal activity. Seen by her cardio outpatient about 2 weeks ago and started Lasix 20mg qd but pt was able to start it about 1 week ago with no significant improvement.  - severely volume overloaded (especially peripherally) on exam. Normal O2 sats on RA. Improving LE edema from admission.  - CXR/CCT: Signs of fluid overload noted on R>L lung parenchyma c/w CHF exacerbation vs PNA  - pBNP 1029 in the setting of obesity.   - TTE 2/9/22 LVEF 40-45%, mild con LVH, mod inferior and posterior hypokinesis, mod MR, trace AI, mild PI, mod TR, RVSP 76. Repeat TTE 1/21/24 demonstrating similar findings w/ LVEF 35% and akinetic segments.  - Continue lasix 40mg IVP BID today and will transition to lasix 40mg po BID tomorrow per cardio. Monitor I&Os  - replete electrolytes -- hypokalemia and hypomagnesemia   - Continue coreg and digoxin. Digoxin level within normal range.   - Continue losartan -- titrate up to 75mg po daily (will give additional 25mg tonight as pt received 50mg in AM)  - cardio consulted (Dr. Drake group), appreciate recs

## 2024-01-23 NOTE — PROGRESS NOTE ADULT - ASSESSMENT
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 Cardiology called for SOB. Dr. Ellis: Cardio.     A fib/Flutter, HTN, Cardiomyopathy, mod LVSD, Pulm HTN, PPM  - presents with shortness of breath, chest pressure and LE edema  - She saw her cardiologist and was started on lasix 20 mg PO daily last week.   - CXR with diffuse mild congestive changes  - BNP 1029  - Known CHF w/ mod LVSD and pulm HTN   - Echo: TTE 2/9/22 LVEF 40-45%, LA 5.3cm, mild con LVH, mod inferior and posterior hypokinesis, mod MR, trace AI, mild PI, mod TR, RVSP 76   - Technically difficult ECHO showed mildly decreased w/ EF 45 to 50 %. Global left ventricular hypokinesis, mild MR< mod AS, mod to sev TR, PASP 78,  severe pulmonary hypertension  - On Lasix to 40mg IV BID. Pt complaints that her mouth feels too dry  - Creatinine:  <--0.80,  Bicarb: 34  - Can change Lasix to 40 mg PO BID  - Monitor strict i/o and daily weight     - EKG showed A paced rhythm/ Aflib @ 61  - Troponin HsI 50.7  - No evidence of any active ischemia     - known A fib  - tele w/ A paced @ 60s, no events, can d/c tele   - Continue Coreg and digoxin  - Continue Xarelto    - BP elevated 160-170s   - Continue increased Losartan to 50mg. Can up titrate further in am if BP remains elevated     - Monitor and replete lytes, keep K>4, Mg>2.  - Will continue to follow.    Flaquita Hansen, MS FNP, AGACNP  Nurse Practitioner- Cardiology   Please call on TEAMS

## 2024-01-23 NOTE — PROGRESS NOTE ADULT - TIME BILLING
Pt seen + examined on 1/22. Case discussed with resident. Agree with assessment and plan above (edited by me in detail):  Time spent: 51min. Time spent discussing/coordinating care with consultants, CM/SW team, and counseling the patient on medical condition -- acute on chronic systolic CHF, IV diuresis, why oral diuretics are insufficient when there is such significant edema, weeping edema, hypertensive urgency.     72 y/o F with PMHx of atrial fibrillation on xarelto, atrial flutter s/p ablation, bradycardia and syncope s/p PPM, HTN, DM2, HFrEF w/ moderate LV dysfunction, hypothyroid, hyperparathyroidism, depression, pulmonary HTN presents to ED on 1/20 with progressively worsening SOB and worsening weeping edema a/w acute on chronic systolic CHF.    - acute on chronic systolic CHF  - At Presentation: Pt with progressive LE edema for the past 3 weeks to groin level and increased SOB with minimal activity. Seen by her cardio outpatient about 2 weeks ago and started Lasix 20mg qd but pt was able to start it about 1 week ago with no significant improvement.  - severely volume overloaded (especially peripherally) on exam. Normal O2 sats on RA. Improving LE edema from admission.  - CXR/CCT: Signs of fluid overload noted on R>L lung parenchyma c/w CHF exacerbation vs PNA  - pBNP 1029 in the setting of obesity.   - TTE 2/9/22 LVEF 40-45%, mild con LVH, mod inferior and posterior hypokinesis, mod MR, trace AI, mild PI, mod TR, RVSP 76. Repeat TTE 1/21/24 demonstrating similar findings w/ LVEF 35% and akinetic segments.  - Continue lasix 40mg IVP BID. Monitor I&Os  - replete electrolytes -- hypokalemia and hypomagnesemia   - Continue coreg and digoxin. Digoxin level within normal range.   - started on losartan -- will titrate up to 50mg po daily  - cardio consulted (Dr. Drake group), appreciate recs    - Pt with progressively worsening b/l LE leg wounds weeping serosanguinous fluid, L>R. b/l LE with extensive pitting edema from foot to hip in the setting of volume overload and likely chronic venous insufficiency.   - Minimal erythema, serosanguinous fluid.    - Afebrile, normal white count. Doubt cellulitis at this time.   - Hold off Abx and monitor clinical course, temp and WBC.   - f/u BCx - NGTD  - f/u wound culture  - ID consulted (Dr Driscoll), recs appreciated  - PT/OT eval ansley MCINTOSH.    - hypertensive urgency with SBP in 180s  - Pt was outpatient on HCTZ 25 mg qd and was discontinued 2 weeks ago by cardio and started Lasix.   - Persistent elevated BP since admission  - Continue Lasix 40mg IV BID  - Continue home coreg  - started on losartan -- will titrate up to 50mg po daily  - Monitor BP    - Hgb A1c 8.8%   - Pt at home on Humulin R U-500, with 80 U AM and 80U PM.   - will c/w pt's home insulin that she brought from home, but at lower dose for now -- Continue 50U AM and PM for now as pt is eating less in the hospital and FSG in the 100s  - decrease to low dose ISS pre-meal; d/c QHS sliding scale  - Diabetic diet, Accucheck and hypoglycemia protocol.   - Diabetes NP consult (Weil), recs appreciated
Pt seen + examined on 1/23. Case discussed with resident. Agree with assessment and plan above (edited by me in detail):  Time spent: 51min. Time spent discussing/coordinating care with consultants, CM/SW team, and counseling the patient on medical condition -- acute on chronic systolic CHF, IV diuresis, why oral diuretics are insufficient when there is such significant edema, weeping edema, hypertensive urgency.     72 y/o F with PMHx of atrial fibrillation on xarelto, atrial flutter s/p ablation, bradycardia and syncope s/p PPM, HTN, DM2, HFrEF w/ moderate LV dysfunction, hypothyroid, hyperparathyroidism, depression, pulmonary HTN presents to ED on 1/20 with progressively worsening SOB and worsening weeping edema a/w acute on chronic systolic CHF.    - acute on chronic systolic CHF  - At Presentation: Pt with progressive LE edema for the past 3 weeks to groin level and increased SOB with minimal activity. Seen by her cardio outpatient about 2 weeks ago and started Lasix 20mg qd but pt was able to start it about 1 week ago with no significant improvement.  - severely volume overloaded (especially peripherally) on exam. Normal O2 sats on RA. Improving LE edema from admission.  - CXR/CCT: Signs of fluid overload noted on R>L lung parenchyma c/w CHF exacerbation vs PNA  - pBNP 1029 in the setting of obesity.   - TTE 2/9/22 LVEF 40-45%, mild con LVH, mod inferior and posterior hypokinesis, mod MR, trace AI, mild PI, mod TR, RVSP 76. Repeat TTE 1/21/24 demonstrating similar findings w/ LVEF 35% and akinetic segments.  - Continue lasix 40mg IVP BID today and will transition to lasix 40mg po BID tomorrow per cardio. Monitor I&Os  - replete electrolytes -- hypokalemia and hypomagnesemia   - Continue coreg and digoxin. Digoxin level within normal range.   - Continue losartan -- titrate up to 75mg po daily (will give additional 25mg tonight as pt received 50mg in AM)  - cardio consulted (Dr. Drake group), appreciate recs    - Pt presented with progressively worsening b/l blisters weeping serosanguinous fluid, L>R. On admission, pt noted to have b/l LE extensive pitting edema from foot to hip in the setting of volume overload and likely chronic venous insufficiency with blisters that drained clear fluid  - mild erythema (no warmth or tenderness), mostly serous fluid; no pus visualized  - Afebrile, normal white count. Doubt cellulitis at this time.   - per ID, will hold off Abx and monitor clinical course, temp and WBC.   - f/u BCx - NGTD  - f/u wound culture  - ID consulted (Dr Driscoll), recs appreciated  - Wound care consult  - PT/OT eval rec DAYNA.  - mupirocin ointment to ruptured blisters which are scabbing over now     - hypertensive urgency with SBP in 180s at times  - Pt was outpatient on HCTZ 25 mg qd and was discontinued 2 weeks ago by cardio and started Lasix.   - Persistent elevated BP since admission  - Continue Lasix 40mg IV BID today --switching to oral lasix tomorrow  - Continue home coreg  - Continue losartan -- titrate up to 75mg po daily (will give additional 25mg tonight as pt received 50mg in AM)  - Monitor BP    - Hgb A1c 8.8%   - Pt at home on Humulin R U-500, with 80 U AM and 80U PM.   - will c/w pt's home insulin that she brought from home, but at lower dose for now -- Continue 50U AM and PM for now as pt is eating less in the hospital and FSG mostly in the 100s  - decreased to low dose ISS pre-meal; d/c QHS sliding scale  - Diabetic diet, Accucheck and hypoglycemia protocol.   - Diabetes NP consult (Weil), recs appreciated
Pt seen + examined on 1/21. Case discussed with resident. Agree with assessment and plan above (edited by me in detail):  Time spent: 51min. Time spent discussing/coordinating care with consultants, CM/SW team, and counseling the patient on medical condition -- acute on chronic systolic CHF, weeping edema, hypertensive urgency.     72 y/o F with PMHx of atrial fibrillation on xarelto, atrial flutter s/p ablation, bradycardia and syncope s/p PPM, HTN, DM2, HFrEF w/ moderate LV dysfunction, hypothyroid, hyperparathyroidism, depression, pulmonary HTN presents to ED on 1/20 with progressively worsening SOB and worsening weeping edema a/w acute on chronic systolic CHF.    - acute on chronic systolic CHF  - Pt with progressive LE edema for the past 3 weeks to groin level and increased SOB with minimal activity. Seen by her cardio outpatient about 2 weeks ago and started Lasix 20mg qd but pt was able to start it about 1 week ago with no significant improvement.  - severely volume overloaded (especially peripherally) on exam. Normal O2 sats on RA  - CXR: Mild CHF at this time  - pBNP 1029 in the setting of obesity.   - TTE 2/9/22 LVEF 40-45%, mild con LVH, mod inferior and posterior hypokinesis, mod MR, trace AI, mild PI, mod TR, RVSP 76.   - Continue lasix 40mg IVP BID. Monitor I&Os  - f/u TTE  - Continue coreg and digoxin. Digoxin level within normal range.   - cardio consulted (Dr. Drake group), appreciate recs    - Pt with progressively worsening b/l LE leg wounds weeping serosanguinous fluid, L>R. b/l LE with extensive pitting edema from foot to hip in the setting of volume overload and likely chronic venous insufficiency.   - Minimal erythema, serosanguinous fluid.    - Afebrile, normal white count. Doubt cellulitis at this time.   - Hold off Abx and monitor clinical course, temp and WBC.   - f/u BCx  - f/u wound culture  - ID consulted (Dr Driscoll), recs appreciated  - PT/OT eval rec [facility].    - Chronic HTN  - hypertensive urgency with SBP in 180s  - Pt was outpatient on Hctz 25 mg qd and was discontinued 2 weeks ago by cardio and started Lasix.   - Persistent elevated BP since admission  - Continue Lasix 40mg IV BID  - Continue home coreg  - Start losartan 25mg daily  - Monitor BP    - Pt at home on Humulin R U-500, with 80 U AM and 80U PM.   - will c/w pt's home insulin that she brought from home, but at lower dose for now -- Continue 50U AM and PM for now as pt states she is eating less in the hospital  - MDISS  - Diabetic diet, Accucheck and hypoglycemia protocol.   - Diabetes NP consult  - Endo Dr. Perlman Consult

## 2024-01-23 NOTE — CASE MANAGEMENT PROGRESS NOTE - NSCMPROGRESSNOTE_GEN_ALL_CORE
Discussed patient on rounds this morning. Patient has been identified as CMS STAR. Patient has been recommended for [facility]. Spoke to SW who stated she will complete the care coordination for patient. CM remains available.

## 2024-01-23 NOTE — CARE COORDINATION ASSESSMENT. - NSCAREPROVIDERS_GEN_ALL_CORE_FT
CARE PROVIDERS:  Accepting Physician: Lexx Cuenca  Administration: Amol Pineda  Administration: Afshan Carbajal  Administration: Kush Alvarez  Administration: Steve Garzon  Admitting: Lexx Cuenca  Attending: Sundeep Solis  Case Management: Urmila Castañeda  Clinical Doc. Improvement: Jana Huggins  Consultant: Demetrio Byrd  Consultant: Mary Sims  Consultant: Weil, Patricia  Consultant: Gina Driscoll  Consultant: Shahrzad Drake  Consultant: Artie Salas  Consultant: Susan Fry  Covering Team: Donovan Reyez  ED ACP: Lupe Meyers  ED Attending: Angel Zaragoza ED Nurse: Kush Le  HIM/Billing & Coding: Lise Evans  Nurse: Caitlin Ledesma  Nurse: Eve Garcia  Occupational Therapy: Bess Vizcaino  Ordered: Doctor, Unknown  Ordered: ADM, User  PCA/Nursing Assistant: Lissy Perez  Physical Therapy: Mann Johnson  Primary Team: Shweta West  Primary Team: July Estrada  Primary Team: Sundeep Solis  Registered Dietitian: Dipti Roger  : Sanchez Reece  Student: Kandis Lopez  Team: [id number]  Hospitalists, Team  Team: [facility], Team

## 2024-01-23 NOTE — CARE COORDINATION ASSESSMENT. - NSPASTMEDSURGHISTORY_GEN_ALL_CORE_FT
PAST MEDICAL & SURGICAL HISTORY:  HTN (hypertension)      DM (diabetes mellitus)      Hypothyroid      Cardiac pacemaker      H/O cardiomyopathy      H/O hyperparathyroidism      H/O pulmonary hypertension      Bradycardia  s/p MDT PPM      Atrial fibrillation and flutter  s/p ablation      History of parathyroid surgery      H/O carpal tunnel repair      H/O cardiac radiofrequency ablation

## 2024-01-23 NOTE — CHART NOTE - NSCHARTNOTEFT_GEN_A_CORE
Patient refused 6pm losartan 75mg    Ordered 1 time dose of losartan 75mg at 22:56 Patient refused 6pm losartan 75mg    Ordered 1 time dose of losartan 75mg at 22:56      -----------  ADDENDUM    Will change daily 75mg losartan to start at 12pm on 1/24/24

## 2024-01-23 NOTE — PROGRESS NOTE ADULT - ASSESSMENT
74 y/o F with PMHx of atrial fibrillation on xarelto, atrial flutter s/p ablation, bradycardia and syncope s/p PPM, HTN, DM2, HFrEF w/ moderate LV dysfunction, hypothyroid, hyperparathyroidism, depression, pulmonary HTN presents to ED on 1/20 with progressively worsening SOB and worsening weeping edema a/w acute on chronic systolic CHF.

## 2024-01-23 NOTE — PROGRESS NOTE ADULT - PROBLEM SELECTOR PLAN 2
- Pt with progressively worsening b/l LE leg wounds weeping serosanguinous fluid, L>R. b/l LE with extensive pitting edema from foot to hip in the setting of volume overload and likely chronic venous insufficiency.   - Minimal erythema, serosanguinous fluid.    - Afebrile, normal white count. Doubt cellulitis at this time.   - Hold off Abx and monitor clinical course, temp and WBC.   - f/u BCx - NGTD  - f/u wound culture  - ID consulted (Dr Driscoll), recs appreciated  - PT/OT eval rec [facility]NA. - Pt with progressively worsening b/l LE leg wounds weeping serosanguinous fluid, L>R. b/l LE with extensive pitting edema from foot to hip in the setting of volume overload and likely chronic venous insufficiency.   - Minimal erythema, serosanguinous fluid.    - Afebrile, normal white count. Doubt cellulitis at this time.   - Hold off Abx and monitor clinical course, temp and WBC.   - f/u BCx - NGTD  - f/u wound culture  - ID consulted (Dr Driscoll), recs appreciated  - Wound care consult  - PT/OT eval rec DAYNA. - Pt presented with progressively worsening b/l blisters weeping serosanguinous fluid, L>R. On admission, pt noted to have b/l LE extensive pitting edema from foot to hip in the setting of volume overload and likely chronic venous insufficiency with blisters that drained clear fluid  - mild erythema (no warmth or tenderness), mostly serous fluid; no pus visualized  - Afebrile, normal white count. Doubt cellulitis at this time.   - per ID, will hold off Abx and monitor clinical course, temp and WBC.   - f/u BCx - NGTD  - f/u wound culture  - ID consulted (Dr Driscoll), recs appreciated  - Wound care consult  - PT/OT eval rec DAYNA.  - mupirocin ointment to ruptured blisters which are scabbing over now

## 2024-01-23 NOTE — PROGRESS NOTE ADULT - SUBJECTIVE AND OBJECTIVE BOX
ROSITA GUTIERREZ is a 73yFemale , patient examined and chart reviewed.     INTERVAL HPI/ OVERNIGHT EVENTS:   No events. Afebrile.    PAST MEDICAL & SURGICAL HISTORY:  Hypothyroid  DM (diabetes mellitus)  HTN (hypertension)  Atrial fibrillation and flutter  s/p ablation  Bradycardia  s/p MDT PPM  H/O pulmonary hypertension  H/O hyperparathyroidism  H/O cardiomyopathy  Cardiac pacemaker  H/O cardiac radiofrequency ablation  H/O carpal tunnel repair  History of parathyroid surgery    For details regarding the patient's social history, family history, and other miscellaneous elements, please refer the initial infectious diseases consultation and/or the admitting history and physical examination for this admission.    ROS:  CONSTITUTIONAL:  Negative fever or chills  EYES:  Negative  blurry vision or double vision  CARDIOVASCULAR:  Negative for chest pain or palpitations  RESPIRATORY:  Negative for cough, wheezing, or SOB   GASTROINTESTINAL:  Negative for nausea, vomiting, diarrhea, constipation, or abdominal pain  GENITOURINARY:  Negative frequency, urgency or dysuria  NEUROLOGIC:  No headache, confusion, dizziness, lightheadedness  All other systems were reviewed and are negative     ALLERGIES  penicillin (Short breath; Rash)  statins (Vomiting)  vancomycin (Short breath; Rash)  erythromycin (Rash)      Current inpatient medications :    ANTIBIOTICS/RELEVANT:    MEDICATIONS  (STANDING):  carvedilol 12.5 milliGRAM(s) Oral every 12 hours  clotrimazole 1% Cream 1 Application(s) Topical two times a day  dextrose 5%. 1000 milliLiter(s) (50 mL/Hr) IV Continuous <Continuous>  dextrose 5%. 1000 milliLiter(s) (50 mL/Hr) IV Continuous <Continuous>  dextrose 5%. 1000 milliLiter(s) (100 mL/Hr) IV Continuous <Continuous>  dextrose 5%. 1000 milliLiter(s) (100 mL/Hr) IV Continuous <Continuous>  dextrose 5%. 1000 milliLiter(s) (50 mL/Hr) IV Continuous <Continuous>  dextrose 50% Injectable 25 Gram(s) IV Push once  dextrose 50% Injectable 12.5 Gram(s) IV Push once  dextrose 50% Injectable 25 Gram(s) IV Push once  dextrose 50% Injectable 25 Gram(s) IV Push once  dextrose 50% Injectable 12.5 Gram(s) IV Push once  digoxin     Tablet 250 MICROGram(s) Oral daily  glucagon  Injectable 1 milliGRAM(s) IntraMuscular once  glucagon  Injectable 1 milliGRAM(s) IntraMuscular once  insulin lispro (ADMELOG) corrective regimen sliding scale   SubCutaneous three times a day before meals  insulin regular (concentrated) human recombinant U-500 50 Unit(s) SubCutaneous two times a day with meals  levothyroxine 125 MICROGram(s) Oral daily  magnesium oxide 400 milliGRAM(s) Oral once  mupirocin 2% Ointment 1 Application(s) Topical two times a day  potassium chloride    Tablet ER 40 milliEquivalent(s) Oral once  rivaroxaban 20 milliGRAM(s) Oral with dinner    MEDICATIONS  (PRN):  acetaminophen     Tablet .. 650 milliGRAM(s) Oral every 6 hours PRN Mild Pain (1 - 3)  dextrose Oral Gel 15 Gram(s) Oral once PRN Blood Glucose LESS THAN 70 milliGRAM(s)/deciliter  zolpidem 5 milliGRAM(s) Oral at bedtime PRN Insomnia  zolpidem 5 milliGRAM(s) Oral at bedtime PRN Insomnia      Objective:  Vital Signs Last 24 Hrs  T(C): 36.9 (23 Jan 2024 12:53), Max: 36.9 (23 Jan 2024 12:53)  T(F): 98.5 (23 Jan 2024 12:53), Max: 98.5 (23 Jan 2024 12:53)  HR: 61 (23 Jan 2024 12:53) (61 - 63)  BP: 169/66 (23 Jan 2024 12:53) (169/66 - 170/90)  RR: 17 (23 Jan 2024 12:53) (17 - 18)  SpO2: 95% (23 Jan 2024 12:53) (93% - 95%)    Parameters below as of 23 Jan 2024 12:53  Patient On (Oxygen Delivery Method): room air      Physical Exam:  General: no acute distress  Neck: supple, trachea midline  Lungs: clear, no wheeze/rhonchi  Cardiovascular: regular rate and rhythm, S1 S2  Abdomen: soft, nontender,  bowel sounds normal  Neurological: alert and oriented x3  Skin: no rash  Extremities: Decreasing edema chronic stasis  Drainage resolved ulcer c/d/i      LABS:                                   13.3   6.65  )-----------( 155      ( 23 Jan 2024 07:48 )             41.6   01-23    138  |  102  |  18  ----------------------------<  168<H>  4.0   |  34<H>  |  0.80    Ca    9.0      23 Jan 2024 07:48  Phos  4.1     01-23  Mg     1.8     01-23      MICROBIOLOGY:    Culture - Blood (collected 20 Jan 2024 13:30)  Source: .Blood Blood-Peripheral  Preliminary Report (21 Jan 2024 22:02):    No growth at 24 hours    Culture - Blood (collected 20 Jan 2024 13:25)  Source: .Blood Blood-Peripheral  Preliminary Report (21 Jan 2024 22:02):    No growth at 24 hours    RADIOLOGY & ADDITIONAL STUDIES:  ACC: [id number] EXAM:  US DPLX LWR EXT VEINS COMPL BI   ORDERED BY:   RAZA JESSICA     PROCEDURE DATE:  01/20/2024          INTERPRETATION:  CLINICAL INFORMATION: Bilateral leg swelling.    COMPARISON: None available.    TECHNIQUE: Duplex sonography of the BILATERAL LOWER extremity veins with   color and spectral Doppler, with and without compression.    FINDINGS:    RIGHT:  Normal compressibility of the RIGHT common femoral, femoral and popliteal   veins.  Doppler examination shows normal spontaneous and phasic flow.  Limited visualized of the RIGHT calf veins. No RIGHT calf vein thrombosis   detected.    LEFT:  Normal compressibility of the LEFT common femoral, femoral and popliteal   veins.  Doppler examination shows normal spontaneous and phasic flow.  Limited visualized of the LEFT calf veins. No LEFT calf vein thrombosis   detected.    IMPRESSION:  No evidence of deep venous thrombosis in either lower extremity.      ACC: [id number] EXAM:  XR CHEST PORTABLE IMMED 1V   ORDERED BY: RAZA JESSICA     PROCEDURE DATE:  01/20/2024          INTERPRETATION:  AP chest on January 20, 2024 at 1:17 PM. Patient has   sepsis.    Heart enlargement and left-sided pacemaker again noted.    There are diffuse mild congestive changes somewhat increased from March 8, 2012.    IMPRESSION: Mild CHF at this time. Stable cardiac findings.      Assessment :   72YO F with PMHx of atrial fibrillation on xarelto, atrial flutter s/p ablation, bradycardia and syncope s/p ppm implant, HTN, DM, cardiomyopathy with moderate LV dysfunction, hypothyroid, hyperparathyroidism, depression, pulmonary HTN presents to ED with progressively worsening SOB and worsening wander LE edema with weeping b/l LE wounds.  Fluid overload with CHD exacerbation wander LE edema/anasarca  CXR with CHF  No concern for leg cellulitis  Cultures NGTD  Clinically improving    Plan :   Monitor off antibiotics  Trend temps and cbc  Diurese per cardio  Elevate leg  Local wound care  Pulm toileting  Stable from ID standpoint    Continue with present regiment.  Appropriate use of antibiotics and adverse effects reviewed.      > 35 minutes were spent in direct patient care reviewing notes, medications ,labs data/ imaging , discussion with multidisciplinary team.    Thank you for allowing me to participate in care of your patient .    Gina Driscoll MD  Infectious Disease  [phone]

## 2024-01-24 ENCOUNTER — TRANSCRIPTION ENCOUNTER (OUTPATIENT)
Age: 74
End: 2024-01-24

## 2024-01-24 LAB
ANION GAP SERPL CALC-SCNC: 1 MMOL/L — LOW (ref 5–17)
BUN SERPL-MCNC: 18 MG/DL — SIGNIFICANT CHANGE UP (ref 7–23)
CALCIUM SERPL-MCNC: 8.6 MG/DL — SIGNIFICANT CHANGE UP (ref 8.5–10.1)
CHLORIDE SERPL-SCNC: 101 MMOL/L — SIGNIFICANT CHANGE UP (ref 96–108)
CO2 SERPL-SCNC: 36 MMOL/L — HIGH (ref 22–31)
CREAT SERPL-MCNC: 0.63 MG/DL — SIGNIFICANT CHANGE UP (ref 0.5–1.3)
EGFR: 92 ML/MIN/1.73M2 — SIGNIFICANT CHANGE UP
GLUCOSE SERPL-MCNC: 163 MG/DL — HIGH (ref 70–99)
HCT VFR BLD CALC: 40.8 % — SIGNIFICANT CHANGE UP (ref 34.5–45)
HGB BLD-MCNC: 12.8 G/DL — SIGNIFICANT CHANGE UP (ref 11.5–15.5)
MAGNESIUM SERPL-MCNC: 1.7 MG/DL — SIGNIFICANT CHANGE UP (ref 1.6–2.6)
MCHC RBC-ENTMCNC: 29 PG — SIGNIFICANT CHANGE UP (ref 27–34)
MCHC RBC-ENTMCNC: 31.4 GM/DL — LOW (ref 32–36)
MCV RBC AUTO: 92.3 FL — SIGNIFICANT CHANGE UP (ref 80–100)
NRBC # BLD: 0 /100 WBCS — SIGNIFICANT CHANGE UP (ref 0–0)
PHOSPHATE SERPL-MCNC: 4.3 MG/DL — SIGNIFICANT CHANGE UP (ref 2.5–4.5)
PLATELET # BLD AUTO: 148 K/UL — LOW (ref 150–400)
POTASSIUM SERPL-MCNC: 3.6 MMOL/L — SIGNIFICANT CHANGE UP (ref 3.5–5.3)
POTASSIUM SERPL-SCNC: 3.6 MMOL/L — SIGNIFICANT CHANGE UP (ref 3.5–5.3)
RBC # BLD: 4.42 M/UL — SIGNIFICANT CHANGE UP (ref 3.8–5.2)
RBC # FLD: 14.9 % — HIGH (ref 10.3–14.5)
SODIUM SERPL-SCNC: 138 MMOL/L — SIGNIFICANT CHANGE UP (ref 135–145)
WBC # BLD: 5.33 K/UL — SIGNIFICANT CHANGE UP (ref 3.8–10.5)
WBC # FLD AUTO: 5.33 K/UL — SIGNIFICANT CHANGE UP (ref 3.8–10.5)

## 2024-01-24 PROCEDURE — 99233 SBSQ HOSP IP/OBS HIGH 50: CPT | Mod: GC

## 2024-01-24 PROCEDURE — 99232 SBSQ HOSP IP/OBS MODERATE 35: CPT

## 2024-01-24 PROCEDURE — 99233 SBSQ HOSP IP/OBS HIGH 50: CPT

## 2024-01-24 RX ORDER — LOSARTAN POTASSIUM 100 MG/1
75 TABLET, FILM COATED ORAL DAILY
Refills: 0 | Status: DISCONTINUED | OUTPATIENT
Start: 2024-01-24 | End: 2024-01-25

## 2024-01-24 RX ORDER — POTASSIUM CHLORIDE 20 MEQ
40 PACKET (EA) ORAL ONCE
Refills: 0 | Status: COMPLETED | OUTPATIENT
Start: 2024-01-24 | End: 2024-01-24

## 2024-01-24 RX ORDER — INSULIN HUMAN 100 [IU]/ML
45 INJECTION, SOLUTION SUBCUTANEOUS
Refills: 0 | Status: DISCONTINUED | OUTPATIENT
Start: 2024-01-24 | End: 2024-01-25

## 2024-01-24 RX ORDER — ACETAZOLAMIDE 250 MG/1
250 TABLET ORAL
Refills: 0 | Status: DISCONTINUED | OUTPATIENT
Start: 2024-01-24 | End: 2024-01-24

## 2024-01-24 RX ORDER — LOSARTAN POTASSIUM 100 MG/1
75 TABLET, FILM COATED ORAL DAILY
Refills: 0 | Status: DISCONTINUED | OUTPATIENT
Start: 2024-01-24 | End: 2024-01-24

## 2024-01-24 RX ADMIN — CARVEDILOL PHOSPHATE 12.5 MILLIGRAM(S): 80 CAPSULE, EXTENDED RELEASE ORAL at 06:03

## 2024-01-24 RX ADMIN — Medication 1: at 11:44

## 2024-01-24 RX ADMIN — Medication 40 MILLIGRAM(S): at 14:19

## 2024-01-24 RX ADMIN — INSULIN HUMAN 50 UNIT(S): 100 INJECTION, SOLUTION SUBCUTANEOUS at 08:06

## 2024-01-24 RX ADMIN — Medication 1 APPLICATION(S): at 06:02

## 2024-01-24 RX ADMIN — MUPIROCIN 1 APPLICATION(S): 20 OINTMENT TOPICAL at 06:03

## 2024-01-24 RX ADMIN — Medication 1: at 08:07

## 2024-01-24 RX ADMIN — Medication 250 MICROGRAM(S): at 06:03

## 2024-01-24 RX ADMIN — CARVEDILOL PHOSPHATE 12.5 MILLIGRAM(S): 80 CAPSULE, EXTENDED RELEASE ORAL at 17:10

## 2024-01-24 RX ADMIN — ZOLPIDEM TARTRATE 5 MILLIGRAM(S): 10 TABLET ORAL at 23:21

## 2024-01-24 RX ADMIN — Medication 650 MILLIGRAM(S): at 09:00

## 2024-01-24 RX ADMIN — Medication 1 APPLICATION(S): at 17:10

## 2024-01-24 RX ADMIN — Medication 40 MILLIEQUIVALENT(S): at 10:26

## 2024-01-24 RX ADMIN — INSULIN HUMAN 45 UNIT(S): 100 INJECTION, SOLUTION SUBCUTANEOUS at 18:21

## 2024-01-24 RX ADMIN — Medication 650 MILLIGRAM(S): at 23:21

## 2024-01-24 RX ADMIN — MUPIROCIN 1 APPLICATION(S): 20 OINTMENT TOPICAL at 17:09

## 2024-01-24 RX ADMIN — LOSARTAN POTASSIUM 75 MILLIGRAM(S): 100 TABLET, FILM COATED ORAL at 11:44

## 2024-01-24 RX ADMIN — Medication 125 MICROGRAM(S): at 06:07

## 2024-01-24 RX ADMIN — RIVAROXABAN 20 MILLIGRAM(S): KIT at 17:09

## 2024-01-24 RX ADMIN — Medication 40 MILLIGRAM(S): at 06:07

## 2024-01-24 RX ADMIN — Medication 650 MILLIGRAM(S): at 08:06

## 2024-01-24 NOTE — DISCHARGE NOTE PROVIDER - PROVIDER TOKENS
PROVIDER:[TOKEN:[[id number]],FOLLOWUP:[2 weeks]],PROVIDER:[TOKEN:[[id number]],FOLLOWUP:[2 weeks]],PROVIDER:[TOKEN:[[id number]],FOLLOWUP:[2 weeks]] PROVIDER:[TOKEN:[[id number]],FOLLOWUP:[1 week]],PROVIDER:[[id number]],FOLLOWUP:[1 week]],PROVIDER:[TOKEN:[[id number]],FOLLOWUP:[1 week]] PROVIDER:[TOKEN:[[id number]],FOLLOWUP:[1 week]],PROVIDER:[TOKEN:[[id number]],FOLLOWUP:[1 week]],PROVIDER:[TOKEN:[[id number]],FOLLOWUP:[1 week]],PROVIDER:[TOKEN:[[id number]],FOLLOWUP:[1 month]]

## 2024-01-24 NOTE — PROGRESS NOTE ADULT - PROBLEM SELECTOR PLAN 2
- Pt presented with progressively worsening b/l blisters weeping serosanguinous fluid, L>R. On admission, pt noted to have b/l LE extensive pitting edema from foot to hip in the setting of volume overload and likely chronic venous insufficiency with blisters that drained clear fluid  - mild erythema (no warmth or tenderness), mostly serous fluid; no pus visualized  - Afebrile, normal white count. Doubt cellulitis at this time.   - per ID, will hold off Abx and monitor clinical course, temp and WBC.   - f/u BCx - NGTD  - f/u wound culture  - ID consulted (Dr Driscoll), recs appreciated  - Wound care consult  - PT/OT eval rec [facility]NA.  - mupirocin ointment to ruptured blisters which are scabbing over now

## 2024-01-24 NOTE — PROGRESS NOTE ADULT - ASSESSMENT
Physical Exam:   Vital Signs Last 24 Hrs  T(C): 36.4 (24 Jan 2024 04:12), Max: 36.9 (23 Jan 2024 12:53)  T(F): 97.5 (24 Jan 2024 04:12), Max: 98.5 (23 Jan 2024 12:53)  HR: 60 (24 Jan 2024 04:12) (60 - 63)  BP: 163/74 (24 Jan 2024 04:12) (133/79 - 169/66)  BP(mean): --  RR: 18 (24 Jan 2024 04:12) (17 - 18)  SpO2: 90% (24 Jan 2024 04:12) (90% - 95%)    Parameters below as of 23 Jan 2024 20:56  Patient On (Oxygen Delivery Method): room air         CAPILLARY BLOOD GLUCOSE      POCT Blood Glucose.: 171 mg/dL (24 Jan 2024 07:59)  POCT Blood Glucose.: 81 mg/dL (24 Jan 2024 00:09)  POCT Blood Glucose.: 109 mg/dL (23 Jan 2024 20:21)  POCT Blood Glucose.: 69 mg/dL (23 Jan 2024 20:00)  POCT Blood Glucose.: 51 mg/dL (23 Jan 2024 19:48)  POCT Blood Glucose.: 85 mg/dL (23 Jan 2024 17:34)  POCT Blood Glucose.: 187 mg/dL (23 Jan 2024 12:27)      Cholesterol, Serum: 113 mg/dL (05.19.21 @ 08:36)     HDL Cholesterol, Serum: 22 mg/dL (05.19.21 @ 08:36)     LDL Cholesterol Calculated: 66 mg/dL (05.19.21 @ 08:36)     DIET: CC  >50%

## 2024-01-24 NOTE — CONSULT NOTE ADULT - CONSULT REASON
Mitch LE wound with weeping edema rule out cellulitis
SOB
dm2 uncontrolled
73y A1C with Estimated Average Glucose Result: 8.8 % (01-21-24 @ 06:28)   diabetes mellitus uncontrolled type 2

## 2024-01-24 NOTE — DISCHARGE NOTE PROVIDER - NSDCFUSCHEDAPPT_GEN_ALL_CORE_FT
Lisa Pisano  [facility]  FAMILYMED [address]  Scheduled Appointment: 02/08/2024

## 2024-01-24 NOTE — DISCHARGE NOTE PROVIDER - NSDCFUADDINST_GEN_ALL_CORE_FT
You were admitted to the hospital due to increased shortness of breath and leg swelling due to an exacerbation of heart failure. During your hospitalization you underwent diuresis as well as an echocardiogram demonstrating relatively stable findings from your prior exam. The cardiology team was consulted and deemed you safe to discharge with a home diuretic and blood pressure medication after you responded very well to diuresis in the hospital. Please ensure that you follow up with all your doctors including your PCP and your cardiologist. Please make sure you take your medications as prescribed in your documentation. Please call your doctor or return to the ED if you have new onset chest pain, shortness of breath, dizziness/lightheadedness, palpitations, or other concerning symptoms.

## 2024-01-24 NOTE — DISCHARGE NOTE PROVIDER - CARE PROVIDERS DIRECT ADDRESSES
,[email or web address],DirectAddress_Unknown ,[email or web address] [email or web address] [email or web address],DirectAddress_Unknown,[email or web address] [email or web address]

## 2024-01-24 NOTE — PROVIDER CONTACT NOTE (HYPOGLYCEMIA EVENT) - NS PROVIDER CONTACT BACKGROUND-HYPO
Age: 73y    Gender: Female    POCT Blood Glucose:  81 mg/dL (01-24-24 @ 00:09)  109 mg/dL (01-23-24 @ 20:21) * Time Blood Glucose reached 100 mg/dL  69 mg/dL (01-23-24 @ 20:00)  51 mg/dL (01-23-24 @ 19:48) * Time hypoglycemic episode started   85 mg/dL (01-23-24 @ 17:34)  187 mg/dL (01-23-24 @ 12:27)  180 mg/dL (01-23-24 @ 08:07)      eMAR:  insulin lispro (ADMELOG) corrective regimen sliding scale   1 Unit(s) SubCutaneous (01-23-24 @ 12:48)   1 Unit(s) SubCutaneous (01-23-24 @ 08:25)    insulin regular (concentrated) human recombinant U-500   50 Unit(s) SubCutaneous (01-23-24 @ 17:06)   50 Unit(s) SubCutaneous (01-23-24 @ 09:08)    levothyroxine   125 MICROGram(s) Oral (01-23-24 @ 05:16)

## 2024-01-24 NOTE — CONSULT NOTE ADULT - PROBLEM SELECTOR RECOMMENDATION 9
cont humulin R U-500 45 units bid  cont cons cho diet  goal bg 100-180 in hosp setting
Type 2 A1c 8.8% adm HF  Recommend endocrine-Perlman on consult  humulin U500 50 units 2xd; moderate scale  FU appt: TBA  DSC recommendations: return to home regimen and glucose monitoring  diabetes education provided  Diabetes support info and cell # [phone] given   Goal 100-180 mg/dL; 140-180 mg/dL in critical care areas

## 2024-01-24 NOTE — PROGRESS NOTE ADULT - PROBLEM SELECTOR PLAN 1
Type 2 A1c 8.8% adm HF  decrease u500 to 45 units BID w/ breakfast and dinner  c/w GERTRUDE  CC diet w/ evening snack and accucheck ACHS  Recommend endocrine-Perlman onconsult  FU appt: TBA  DSC recommendations: return to home regimen and glucose monitoring, lifestyle and diet modifications  diabetes education provided as documented above  Diabetes support info and cell # [phone] given   Goal 100-180 mg/dL; 140-180 mg/dL in critical care areas

## 2024-01-24 NOTE — DISCHARGE NOTE PROVIDER - CARE PROVIDER_API CALL
Lisa Pisano.  Family Medicine  [address], [address]  [place] [address]  Phone: [phone]  Fax: [phone]  Follow Up Time: 2 weeks    Yvan Ellis  Cardiovascular Disease  [address] [place] [address]  Phone: [phone]  Fax: [phone]  Follow Up Time: 2 weeks    Xiomara Borrero  Endocrinology/Metab/Diabetes  [address] [place] [address], [facility] [address]  [place] [address]  Phone: [phone]  Fax: [phone]  Follow Up Time: 2 weeks   Lisa Pisano  Family Medicine  [address] [facility], [address]  [place] [address]  Phone: [phone]  Fax: [phone]  Follow Up Time: 1 week    Yvan Ellis  Cardiovascular Disease  [address] [place] [address]  Phone: [phone]  Fax: [phone]  Follow Up Time: 1 week    Xiomara Borrero  Endocrinology/Metab/Diabetes  [address] [place] [address], [address]  [place] [address]  Phone: [phone]  Fax: [phone]  Follow Up Time: 1 week   Lisa Pisano  Family Medicine  [address] [facility], [address]  [place] [address]  Phone: [phone]  Fax: [phone]  Follow Up Time: 1 week    Yvan Ellis  Cardiovascular Disease  [address] [facility], [place] [address]  Phone: [phone]  Fax: [phone]  Follow Up Time: 1 week    Xiomara Borrero  Endocrinology/Metab/Diabetes  [address] [place] [address], [address]  [place] [address]  Phone: [phone]  Fax: [phone]  Follow Up Time: 1 week    Bony Dumont  Plastic Surgery  [address] [place] [address]  Phone: [phone]  Fax: [phone]  Follow Up Time: 1 month

## 2024-01-24 NOTE — PROGRESS NOTE ADULT - PROBLEM SELECTOR PLAN 5
Chronic.  A1c 8.8%on admission.   - Pt at home on Humulin R U-500, with 80 U AM and 80U PM.   - will c/w pt's home insulin that she brought from home, but at lower dose for now  - Diabetic diet, Accucheck and hypoglycemia protocol.   - Diabetes NP consult (Weil), recs appreciated

## 2024-01-24 NOTE — CONSULT NOTE ADULT - SUBJECTIVE AND OBJECTIVE BOX
"I have reviewed the surgical (or preoperative) H&P that is linked to this encounter, and examined the patient. There are no significant changes. It is an incomplete note and I reviewed several notes to piece together my evaluation.    Clinical Conditions Present on Arrival:  Clinically Significant Risk Factors Present on Admission                   # Overweight: Estimated body mass index is 25.67 kg/m  as calculated from the following:    Height as of this encounter: 1.702 m (5' 7\").    Weight as of this encounter: 74.3 kg (163 lb 14.4 oz).       "   Patient is a 73y old  Female who presents with a chief complaint of b/l LE wounds (24 Jan 2024 14:51)      Reason For Consult: dm2 uncontrolled    HPI:  74 y/o F with PMHx of atrial fibrillation on xarelto, atrial flutter s/p ablation, bradycardia and syncope s/p ppm implant, HTN, DM, cardiomyopathy with moderate LV dysfunction, hypothyroid, hyperparathyroidism, depression, pulmonary HTN presents to ED on 1/20 with progressively worsening SOB and worsening weeping b/l LE wounds.  Patient reports increasing shortness of breath over the past few weeks that has progressed to the level of affecting her daily activities. Lives alone and normally able to care for herself but has been increasingly unable to due to SOB.  Patient also notes increased lower extremity swelling with edema. Pt has baseline level of edema, but this is worse than usual and has been accompanied by weeping wounds that have progressively worsened. Of note pt was recently started on lasix 20mg PO which has not provided much help. Notes that swelling involves her legs up to the hip and her abdomen. Denies fever, chills, chest pain, cough, abdominal pain, nausea, vomiting, diarrhea, constipation, urinary frequency, urgency, or dysuria, headaches, changes in vision, dizziness, numbness, tingling.    ED Course:   Vitals: BP: 182/92, HR: 68 , Temp: 98.3 , RR: 16 , SpO2: 98% on RA   Labs:  INR 1.78, Pro BNP 1029,   UA: protein 300, trace LE  CXR: Mild CHF at this time. Stable cardiac findings.  LE Duplex: No evidence of deep venous thrombosis in either lower extremity.  EKG: A-paced 61, Wide QRS rhythm,  Bifascicular block, Left ventricular hypertrophy with repolarization abnormality   Received clindamycin 900mg IVPB x1 in the ED    (20 Jan 2024 16:22)      PAST MEDICAL & SURGICAL HISTORY:  Hypothyroid      DM (diabetes mellitus)      HTN (hypertension)      Atrial fibrillation and flutter  s/p ablation      Bradycardia  s/p MDT PPM      H/O pulmonary hypertension      H/O hyperparathyroidism      H/O cardiomyopathy      Cardiac pacemaker      H/O cardiac radiofrequency ablation      H/O carpal tunnel repair      History of parathyroid surgery          FAMILY HISTORY:        Social History:    MEDICATIONS  (STANDING):  carvedilol 12.5 milliGRAM(s) Oral every 12 hours  clotrimazole 1% Cream 1 Application(s) Topical two times a day  dextrose 5%. 1000 milliLiter(s) (50 mL/Hr) IV Continuous <Continuous>  dextrose 5%. 1000 milliLiter(s) (50 mL/Hr) IV Continuous <Continuous>  dextrose 5%. 1000 milliLiter(s) (100 mL/Hr) IV Continuous <Continuous>  dextrose 5%. 1000 milliLiter(s) (100 mL/Hr) IV Continuous <Continuous>  dextrose 5%. 1000 milliLiter(s) (50 mL/Hr) IV Continuous <Continuous>  dextrose 50% Injectable 25 Gram(s) IV Push once  dextrose 50% Injectable 12.5 Gram(s) IV Push once  dextrose 50% Injectable 12.5 Gram(s) IV Push once  dextrose 50% Injectable 25 Gram(s) IV Push once  dextrose 50% Injectable 25 Gram(s) IV Push once  digoxin     Tablet 250 MICROGram(s) Oral daily  furosemide    Tablet 40 milliGRAM(s) Oral two times a day  glucagon  Injectable 1 milliGRAM(s) IntraMuscular once  glucagon  Injectable 1 milliGRAM(s) IntraMuscular once  insulin lispro (ADMELOG) corrective regimen sliding scale   SubCutaneous three times a day before meals  insulin regular (concentrated) human recombinant U-500 45 Unit(s) SubCutaneous two times a day before meals  levothyroxine 125 MICROGram(s) Oral daily  losartan 75 milliGRAM(s) Oral daily  losartan 75 milliGRAM(s) Oral daily  magnesium oxide 400 milliGRAM(s) Oral once  mupirocin 2% Ointment 1 Application(s) Topical two times a day  potassium chloride    Tablet ER 40 milliEquivalent(s) Oral once  rivaroxaban 20 milliGRAM(s) Oral with dinner    MEDICATIONS  (PRN):  acetaminophen     Tablet .. 650 milliGRAM(s) Oral every 6 hours PRN Mild Pain (1 - 3)  dextrose Oral Gel 15 Gram(s) Oral once PRN Blood Glucose LESS THAN 70 milliGRAM(s)/deciliter  zolpidem 5 milliGRAM(s) Oral at bedtime PRN Insomnia  zolpidem 5 milliGRAM(s) Oral at bedtime PRN Insomnia        T(C): 36.4 (01-24-24 @ 11:36), Max: 36.4 (01-24-24 @ 04:12)  HR: 61 (01-24-24 @ 11:36) (60 - 63)  BP: 150/65 (01-24-24 @ 11:36) (150/65 - 163/74)  RR: 15 (01-24-24 @ 11:36) (15 - 18)  SpO2: 94% (01-24-24 @ 11:36) (90% - 94%)  Wt(kg): --    PHYSICAL EXAM:  CHEST/LUNG: Clear to percussion bilaterally; No rales, rhonchi, wheezing, or rubs  HEART: Regular rate and rhythm; No murmurs, rubs, or gallops  ABDOMEN: Soft, Nontender, Nondistended; Bowel sounds present  EXTREMITIES:  b/l le edema    CAPILLARY BLOOD GLUCOSE      POCT Blood Glucose.: 132 mg/dL (24 Jan 2024 20:58)  POCT Blood Glucose.: 189 mg/dL (24 Jan 2024 18:19)  POCT Blood Glucose.: 98 mg/dL (24 Jan 2024 16:42)  POCT Blood Glucose.: 184 mg/dL (24 Jan 2024 11:20)  POCT Blood Glucose.: 171 mg/dL (24 Jan 2024 07:59)  POCT Blood Glucose.: 81 mg/dL (24 Jan 2024 00:09)                            12.8   5.33  )-----------( 148      ( 24 Jan 2024 07:05 )             40.8       CMP:  01-24 @ 07:05  SGPT --  Albumin --   Alk Phos --   Anion Gap 1   SGOT --   Total Bili --   BUN 18   Calcium Total 8.6   CO2 36   Chloride 101   Creatinine 0.63   eGFR if AA --   eGFR if non AA --   Glucose 163   Potassium 3.6   Protein --   Sodium 138      Thyroid Function Tests:      Diabetes Tests:       Radiology:

## 2024-01-24 NOTE — PROGRESS NOTE ADULT - SUBJECTIVE AND OBJECTIVE BOX
Patient is a 73y old  Female who presents with a chief complaint of b/l LE wounds (24 Jan 2024 09:51)    updates: seen patient @ beside, reports feeling improved, NAD, notes hypoglycemia events @ HS, experience these at home, was taking 100units U500 BID no correctional, titrated down to 80 units BID @ home, will lower to 45 units BID here and add carb snack @ HS, discussed type onset and duration of u500, extended halflife. discussed BG montioring and trending, has freestyle elvin 2 @ home but unsure how to place, will reinforce education for patient prior to discharge.   reviewed s/s of hypoglycemia and management w/ 15/15 rule until >100mg/dL. consistent meal plans and carb intake. verbal education and handouts provided    HPI:  72 y/o F with PMHx of atrial fibrillation on xarelto, atrial flutter s/p ablation, bradycardia and syncope s/p ppm implant, HTN, DM, cardiomyopathy with moderate LV dysfunction, hypothyroid, hyperparathyroidism, depression, pulmonary HTN presents to ED on 1/20 with progressively worsening SOB and worsening weeping b/l LE wounds.  Patient reports increasing shortness of breath over the past few weeks that has progressed to the level of affecting her daily activities. Lives alone and normally able to care for herself but has been increasingly unable to due to SOB.  Patient also notes increased lower extremity swelling with edema. Pt has baseline level of edema, but this is worse than usual and has been accompanied by weeping wounds that have progressively worsened. Of note pt was recently started on lasix 20mg PO which has not provided much help. Notes that swelling involves her legs up to the hip and her abdomen. Denies fever, chills, chest pain, cough, abdominal pain, nausea, vomiting, diarrhea, constipation, urinary frequency, urgency, or dysuria, headaches, changes in vision, dizziness, numbness, tingling.    ED Course:   Vitals: BP: 182/92, HR: 68 , Temp: 98.3 , RR: 16 , SpO2: 98% on RA   Labs:  INR 1.78, Pro BNP 1029,   UA: protein 300, trace LE  CXR: Mild CHF at this time. Stable cardiac findings.  LE Duplex: No evidence of deep venous thrombosis in either lower extremity.  EKG: A-paced 61, Wide QRS rhythm,  Bifascicular block, Left ventricular hypertrophy with repolarization abnormality   Received clindamycin 900mg IVPB x1 in the ED    (20 Jan 2024 16:22)      PAST MEDICAL & SURGICAL HISTORY:  Hypothyroid      DM (diabetes mellitus)      HTN (hypertension)      Atrial fibrillation and flutter  s/p ablation      Bradycardia  s/p MDT PPM      H/O pulmonary hypertension      H/O hyperparathyroidism      H/O cardiomyopathy      Cardiac pacemaker      H/O cardiac radiofrequency ablation      H/O carpal tunnel repair      History of parathyroid surgery      Allergies    penicillin (Short breath; Rash)  statins (Vomiting)  vancomycin (Short breath; Rash)  erythromycin (Rash)    Intolerances        MEDICATIONS  (STANDING):  carvedilol 12.5 milliGRAM(s) Oral every 12 hours  clotrimazole 1% Cream 1 Application(s) Topical two times a day  dextrose 5%. 1000 milliLiter(s) (50 mL/Hr) IV Continuous <Continuous>  dextrose 5%. 1000 milliLiter(s) (50 mL/Hr) IV Continuous <Continuous>  dextrose 5%. 1000 milliLiter(s) (100 mL/Hr) IV Continuous <Continuous>  dextrose 5%. 1000 milliLiter(s) (100 mL/Hr) IV Continuous <Continuous>  dextrose 5%. 1000 milliLiter(s) (50 mL/Hr) IV Continuous <Continuous>  dextrose 50% Injectable 25 Gram(s) IV Push once  dextrose 50% Injectable 25 Gram(s) IV Push once  dextrose 50% Injectable 25 Gram(s) IV Push once  dextrose 50% Injectable 12.5 Gram(s) IV Push once  dextrose 50% Injectable 12.5 Gram(s) IV Push once  digoxin     Tablet 250 MICROGram(s) Oral daily  furosemide    Tablet 40 milliGRAM(s) Oral two times a day  glucagon  Injectable 1 milliGRAM(s) IntraMuscular once  glucagon  Injectable 1 milliGRAM(s) IntraMuscular once  insulin lispro (ADMELOG) corrective regimen sliding scale   SubCutaneous three times a day before meals  insulin regular (concentrated) human recombinant U-500 45 Unit(s) SubCutaneous two times a day before meals  levothyroxine 125 MICROGram(s) Oral daily  losartan 75 milliGRAM(s) Oral daily  losartan 75 milliGRAM(s) Oral daily  magnesium oxide 400 milliGRAM(s) Oral once  mupirocin 2% Ointment 1 Application(s) Topical two times a day  potassium chloride    Tablet ER 40 milliEquivalent(s) Oral once  rivaroxaban 20 milliGRAM(s) Oral with dinner

## 2024-01-24 NOTE — DISCHARGE NOTE PROVIDER - NSDCCPCAREPLAN_GEN_ALL_CORE_FT
PRINCIPAL DISCHARGE DIAGNOSIS  Diagnosis: Acute systolic congestive heart failure  Assessment and Plan of Treatment: You were admitted for acute congestive hear failure. You received IV diuretic - Lasix removing the extra fluid in your body and then transitioned to oral Lasix.   Echocardiogram was done and showed EF 45 to 50 % with  Global left ventricular hypokinesis, mild MR, mod AS, mod to sev TR, and severe pulmonary hypertension.   Please, continue to take   Follow up with your cardiologist after discharge.      SECONDARY DISCHARGE DIAGNOSES  Diagnosis: HTN (hypertension)  Assessment and Plan of Treatment: You presented sustained elevated blood pressure while in the hospital.     PRINCIPAL DISCHARGE DIAGNOSIS  Diagnosis: Acute systolic congestive heart failure  Assessment and Plan of Treatment: You were admitted for acute congestive hear failure. Cardiology was consulted.  You received IV diuretic - Lasix removing the extra fluid in your body and then transitioned to oral Lasix.   Echocardiogram was done and showed EF 45 to 50 % with  Global left ventricular hypokinesis, mild MR, mod AS, mod to sev TR, and severe pulmonary hypertension.   Please, continue to take FUROSEMIDE (Lasix) 40mg every morning.   Follow up with your cardiologist within 1 week after discharge.      SECONDARY DISCHARGE DIAGNOSES  Diagnosis: HTN (hypertension)  Assessment and Plan of Treatment: You presented sustained elevated blood pressure while in the hospital.  Please continue to take LOSARTAN 75 mg (one and a half tablet) every day.   Follow up with your cardiologist.       Diagnosis: DM (diabetes mellitus)  Assessment and Plan of Treatment: You presented few episodes of mild hypoglycemia in the evening.  Endocrinology and diabetic team was consulted.   Please, continue to use your home insulin Humulin U-500, 50 units twice a day (morning and evening).   Follow up with your endocrinologist after discharge.        PRINCIPAL DISCHARGE DIAGNOSIS  Diagnosis: Acute systolic congestive heart failure  Assessment and Plan of Treatment: You were admitted for acute congestive hear failure. Cardiology was consulted.  You received IV diuretic - Lasix removing the extra fluid in your body and then transitioned to oral Lasix.   Echocardiogram was done and showed EF 45 to 50 % with  Global left ventricular hypokinesis, mild MR, mod AS, mod to sev TR, and severe pulmonary hypertension.   Please, continue to take FUROSEMIDE (Lasix) 40mg every morning.   Continue potassium chloride 10mEq daily. repeat BMP next week with your primary care physician   Follow up with your cardiologist within 1 week after discharge.      SECONDARY DISCHARGE DIAGNOSES  Diagnosis: HTN (hypertension)  Assessment and Plan of Treatment: You presented sustained elevated blood pressure while in the hospital.  Please continue to take LOSARTAN 75 mg (one and a half tablet) every day.   Follow up with your cardiologist.       Diagnosis: DM (diabetes mellitus)  Assessment and Plan of Treatment: You presented few episodes of mild hypoglycemia in the evening.  Endocrinology and diabetic team was consulted.   Please, continue to use your home insulin Humulin U-500, 50 units twice a day (morning and evening).   Follow up with your endocrinologist after discharge.

## 2024-01-24 NOTE — DISCHARGE NOTE PROVIDER - NSDCMRMEDTOKEN_GEN_ALL_CORE_FT
carvedilol 12.5 mg oral tablet: 1 tab(s) orally 2 times a day  digoxin 250 mcg (0.25 mg) oral tablet: 1 tab(s) orally once a day  HumaLOG 100 units/mL injectable solution: 80 unit(s) injectable in the morning and at bedtime  Lasix 20 mg oral tablet: 1 tab(s) orally once a day  Synthroid 125 mcg (0.125 mg) oral tablet: 1 tab(s) orally once a day  Tylenol 325 mg oral tablet: 2 tab(s) orally every 4 hours  Xarelto 20 mg oral tablet: 1 tab(s) orally once a day (in the evening)  hold today and tomorrow and resume on 10/14 evening   zolpidem 12.5 mg oral tablet, extended release: 1 tab(s) orally once a day (at bedtime)   carvedilol 12.5 mg oral tablet: 1 tab(s) orally every 12 hours  clotrimazole 1% topical cream: Apply topically to affected area 2 times a day 1 Apply topically to affected area 2 times a day  digoxin 250 mcg (0.25 mg) oral tablet: 1 tab(s) orally once a day  furosemide 40 mg oral tablet: 1 tab(s) orally once a day Take 1 tablet every morning.  insulin regular (concentrated) 500 units/mL human recombinant subcutaneous solution: 50 unit(s) subcutaneous 2 times a day (before meals)  Lasix 40 mg oral tablet: 1 tab(s) orally once a day  losartan 50 mg oral tablet: 1.5 tab(s) orally once a day Take one and a half tablet every day.  losartan 50 mg oral tablet: 1.5 tab(s) orally once a day please take 1.5 tablets (75mg) daily  Potassium Chloride (Eqv-Klor-Con 10) 10 mEq oral tablet, extended release: 1 tab(s) orally once a day  Synthroid 125 mcg (0.125 mg) oral tablet: 1 tab(s) orally once a day  Tylenol 325 mg oral tablet: 2 tab(s) orally every 4 hours  Xarelto 20 mg oral tablet: 1 tab(s) orally once a day (in the evening) resume today  zolpidem 12.5 mg oral tablet, extended release: 1 tab(s) orally once a day (at bedtime)   carvedilol 12.5 mg oral tablet: 1 tab(s) orally every 12 hours  clotrimazole 1% topical cream: Apply topically to affected area 2 times a day 1 Apply topically to affected area 2 times a day  digoxin 250 mcg (0.25 mg) oral tablet: 1 tab(s) orally once a day  insulin regular (concentrated) 500 units/mL human recombinant subcutaneous solution: 50 unit(s) subcutaneous 2 times a day (before meals)  Lasix 40 mg oral tablet: 1 tab(s) orally once a day  losartan 50 mg oral tablet: 1.5 tab(s) orally once a day please take 1.5 tablets (75mg) daily  Potassium Chloride (Eqv-Klor-Con 10) 10 mEq oral tablet, extended release: 1 tab(s) orally once a day  Synthroid 125 mcg (0.125 mg) oral tablet: 1 tab(s) orally once a day  Tylenol 325 mg oral tablet: 2 tab(s) orally every 4 hours  Xarelto 20 mg oral tablet: 1 tab(s) orally once a day (in the evening) resume today  zolpidem 12.5 mg oral tablet, extended release: 1 tab(s) orally once a day (at bedtime)

## 2024-01-24 NOTE — PROGRESS NOTE ADULT - PROBLEM SELECTOR PLAN 3
Chronic HTN  - hypertensive urgency with SBP in 180s at times  - Pt was outpatient on HCTZ 25 mg qd and was discontinued 2 weeks ago by cardio and started Lasix.   - Persistent elevated BP since admission  - Continue Lasix 40mg PO BID  - Continue home coreg  - Continue losartan 75mg po daily  - Monitor BP

## 2024-01-24 NOTE — PROGRESS NOTE ADULT - PROBLEM SELECTOR PLAN 1
- acute on chronic systolic CHF  - At Presentation: Pt with progressive LE edema for the past 3 weeks to groin level and increased SOB with minimal activity. Seen by her cardio outpatient about 2 weeks ago and started Lasix 20mg qd but pt was able to start it about 1 week ago with no significant improvement.  - severely volume overloaded (especially peripherally) on exam. Normal O2 sats on RA. Improving LE edema from admission.  - CXR/CCT: Signs of fluid overload noted on R>L lung parenchyma c/w CHF exacerbation vs PNA  - pBNP 1029 in the setting of obesity.   - TTE 2/9/22 LVEF 40-45%, mild con LVH, mod inferior and posterior hypokinesis, mod MR, trace AI, mild PI, mod TR, RVSP 76. Repeat TTE 1/21/24 demonstrating similar findings w/ LVEF 35% and akinetic segments.  - Continue lasix 40mg PO BID per cardio. Monitor I&Os  - replete electrolytes -- hypokalemia and hypomagnesemia   - Continue coreg and digoxin. Digoxin level within normal range.   - Continue losartan 75mg po daily  - cardio consulted (Dr. Drake [facility]), appreciate recs

## 2024-01-24 NOTE — DISCHARGE NOTE PROVIDER - HOSPITAL COURSE
HPI:  74 y/o F with PMHx of atrial fibrillation on xarelto, atrial flutter s/p ablation, bradycardia and syncope s/p ppm implant, HTN, DM, cardiomyopathy with moderate LV dysfunction, hypothyroid, hyperparathyroidism, depression, pulmonary HTN presents to ED on 1/20 with progressively worsening SOB and worsening weeping b/l LE wounds.  Patient reports increasing shortness of breath over the past few weeks that has progressed to the level of affecting her daily activities. Lives alone and normally able to care for herself but has been increasingly unable to due to SOB.  Patient also notes increased lower extremity swelling with edema. Pt has baseline level of edema, but this is worse than usual and has been accompanied by weeping wounds that have progressively worsened. Of note pt was recently started on lasix 20mg PO which has not provided much help. Notes that swelling involves her legs up to the hip and her abdomen. Denies fever, chills, chest pain, cough, abdominal pain, nausea, vomiting, diarrhea, constipation, urinary frequency, urgency, or dysuria, headaches, changes in vision, dizziness, numbness, tingling.    ED Course:   Vitals: BP: 182/92, HR: 68 , Temp: 98.3 , RR: 16 , SpO2: 98% on RA   Labs:  INR 1.78, Pro BNP 1029,   UA: protein 300, trace LE  CXR: Mild CHF at this time. Stable cardiac findings.  LE Duplex: No evidence of deep venous thrombosis in either lower extremity.  EKG: A-paced 61, Wide QRS rhythm,  Bifascicular block, Left ventricular hypertrophy with repolarization abnormality   Received clindamycin 900mg IVPB x1 in the ED    (20 Jan 2024 16:22)    HOSPITAL COURSE: pt admitted for acute congestive heart failure and r/o LE wound infection. ID and cardiology were consulted. Pt was started on Lasix 40mg bid,  TTE showed EF 45 to 50 %. Global left ventricular hypokinesis, mild MR< mod AS, mod to sev TR, PASP 78, severe pulmonary hypertension  WBC remained normal with no fevers, and no concern for LE cellulitis/infection and pt was monitored off antibiotics. Blood culture from admission were negative.       CONSULTANTS:   Cardio   ID   Diabetes NP    TIME SPENT:  I, the attending physician, was physically present for the key portions of the evaluation and management (E/M) service provided. The total amount of time spent reviewing the hospital notes, laboratory values, imaging findings, assessing/counseling the patient, discussing with consultant physicians, social work, nursing staff was -- minutes    ---  Primary care provider was made aware of plan for discharge:      [  ] NO     [  ] YES   HPI:  74 y/o F with PMHx of atrial fibrillation on xarelto, atrial flutter s/p ablation, bradycardia and syncope s/p ppm implant, HTN, DM, cardiomyopathy with moderate LV dysfunction, hypothyroid, hyperparathyroidism, depression, pulmonary HTN presents to ED on 1/20 with progressively worsening SOB and worsening weeping b/l LE wounds.  Patient reports increasing shortness of breath over the past few weeks that has progressed to the level of affecting her daily activities. Lives alone and normally able to care for herself but has been increasingly unable to due to SOB.  Patient also notes increased lower extremity swelling with edema. Pt has baseline level of edema, but this is worse than usual and has been accompanied by weeping wounds that have progressively worsened. Of note pt was recently started on lasix 20mg PO which has not provided much help. Notes that swelling involves her legs up to the hip and her abdomen. Denies fever, chills, chest pain, cough, abdominal pain, nausea, vomiting, diarrhea, constipation, urinary frequency, urgency, or dysuria, headaches, changes in vision, dizziness, numbness, tingling.    ED Course:   Vitals: BP: 182/92, HR: 68 , Temp: 98.3 , RR: 16 , SpO2: 98% on RA   Labs:  INR 1.78, Pro BNP 1029,   UA: protein 300, trace LE  CXR: Mild CHF at this time. Stable cardiac findings.  LE Duplex: No evidence of deep venous thrombosis in either lower extremity.  EKG: A-paced 61, Wide QRS rhythm,  Bifascicular block, Left ventricular hypertrophy with repolarization abnormality   Received clindamycin 900mg IVPB x1 in the ED    (20 Jan 2024 16:22)    HOSPITAL COURSE: pt admitted for acute congestive heart failure and r/o LE wound infection. ID and cardiology were consulted. Pt was started on Lasix 40mg bid,  TTE showed EF 45 to 50 %. Global left ventricular hypokinesis, mild MR< mod AS, mod to sev TR, PASP 78, severe pulmonary hypertension  WBC remained normal with no fevers, and no concern for LE cellulitis/infection and pt was monitored off antibiotics. Blood culture from admission were negative. Responded appropriately to IV and then PO diuresis, cleared for home discharge w/ Lasix 40 daily and losartan 75 daily.      CONSULTANTS:   Cardio   ID   Diabetes NP    TIME SPENT:  I, the attending physician, was physically present for the key portions of the evaluation and management (E/M) service provided. The total amount of time spent reviewing the hospital notes, laboratory values, imaging findings, assessing/counseling the patient, discussing with consultant physicians, social work, nursing staff was -- minutes    ---  Primary care provider was made aware of plan for discharge:      [  ] NO     [  ] YES   HPI:  72 y/o F with PMHx of atrial fibrillation on xarelto, atrial flutter s/p ablation, bradycardia and syncope s/p ppm implant, HTN, DM, cardiomyopathy with moderate LV dysfunction, hypothyroid, hyperparathyroidism, depression, pulmonary HTN presents to ED on 1/20 with progressively worsening SOB and worsening weeping b/l LE wounds.  Patient reports increasing shortness of breath over the past few weeks that has progressed to the level of affecting her daily activities. Lives alone and normally able to care for herself but has been increasingly unable to due to SOB.  Patient also notes increased lower extremity swelling with edema. Pt has baseline level of edema, but this is worse than usual and has been accompanied by weeping wounds that have progressively worsened. Of note pt was recently started on lasix 20mg PO which has not provided much help. Notes that swelling involves her legs up to the hip and her abdomen. Denies fever, chills, chest pain, cough, abdominal pain, nausea, vomiting, diarrhea, constipation, urinary frequency, urgency, or dysuria, headaches, changes in vision, dizziness, numbness, tingling.    ED Course:   Vitals: BP: 182/92, HR: 68 , Temp: 98.3 , RR: 16 , SpO2: 98% on RA   Labs:  INR 1.78, Pro BNP 1029,   UA: protein 300, trace LE  CXR: Mild CHF at this time. Stable cardiac findings.  LE Duplex: No evidence of deep venous thrombosis in either lower extremity.  EKG: A-paced 61, Wide QRS rhythm,  Bifascicular block, Left ventricular hypertrophy with repolarization abnormality   Received clindamycin 900mg IVPB x1 in the ED    (20 Jan 2024 16:22)    HOSPITAL COURSE: pt admitted for acute congestive heart failure and r/o LE wound infection. ID, cardiology and endo were consulted. Pt was started on Lasix 40mg bid,  TTE showed EF 45 to 50 %, global left ventricular hypokinesis, mild MR< mod AS, mod to sev TR, PASP 78, severe pulmonary hypertension. WBC remained normal with no fevers, and no concern for LE cellulitis/infection and pt was monitored off antibiotics. Blood culture from admission were negative. Pt presented sustained elevated blood pressure and losartan was started and titrated  with better control. Pt presented as well episodes of mild hypoglycemia and Humulin was adjusted.  Pt presented clinical improvement, responded appropriately to IV and then PO diuresis, cleared for home discharge w/ Lasix 40 daily and losartan 75 daily. Initial PT evaluation recommended discharge to [facility]. Pt declined and continued to work with PT with better mobility once the volume overload improved.  Pt will continue home PT.     Pt seen and examined the day of discharge, stable to go home.      CONSULTANTS:   Cardio Dr. Patel ID Dr. Chan Endo Dr. Perlman  Diabetes NP    TIME SPENT:  I, the attending physician, was physically present for the key portions of the evaluation and management (E/M) service provided. The total amount of time spent reviewing the hospital notes, laboratory values, imaging findings, assessing/counseling the patient, discussing with consultant physicians, social work, nursing staff was -- minutes    ---  Primary care provider was made aware of plan for discharge:      [  ] NO     [  ] YES   HPI:  74 y/o F with PMHx of atrial fibrillation on xarelto, atrial flutter s/p ablation, bradycardia and syncope s/p ppm implant, HTN, DM, cardiomyopathy with moderate LV dysfunction, hypothyroid, hyperparathyroidism, depression, pulmonary HTN presents to ED on 1/20 with progressively worsening SOB and worsening weeping b/l LE wounds.  Patient reports increasing shortness of breath over the past few weeks that has progressed to the level of affecting her daily activities. Lives alone and normally able to care for herself but has been increasingly unable to due to SOB.  Patient also notes increased lower extremity swelling with edema. Pt has baseline level of edema, but this is worse than usual and has been accompanied by weeping wounds that have progressively worsened. Of note pt was recently started on lasix 20mg PO which has not provided much help. Notes that swelling involves her legs up to the hip and her abdomen. Denies fever, chills, chest pain, cough, abdominal pain, nausea, vomiting, diarrhea, constipation, urinary frequency, urgency, or dysuria, headaches, changes in vision, dizziness, numbness, tingling.    ED Course:   Vitals: BP: 182/92, HR: 68 , Temp: 98.3 , RR: 16 , SpO2: 98% on RA   Labs:  INR 1.78, Pro BNP 1029,   UA: protein 300, trace LE  CXR: Mild CHF at this time. Stable cardiac findings.  LE Duplex: No evidence of deep venous thrombosis in either lower extremity.  EKG: A-paced 61, Wide QRS rhythm,  Bifascicular block, Left ventricular hypertrophy with repolarization abnormality   Received clindamycin 900mg IVPB x1 in the ED    (20 Jan 2024 16:22)    HOSPITAL COURSE: pt admitted for acute congestive heart failure and r/o LE wound infection. ID, cardiology and endo were consulted. Pt was started on Lasix 40mg bid,  TTE showed EF 45 to 50 %, global left ventricular hypokinesis, mild MR< mod AS, mod to sev TR, PASP 78, severe pulmonary hypertension. WBC remained normal with no fevers, and no concern for LE cellulitis/infection and pt was monitored off antibiotics. Blood culture from admission were negative. Pt presented sustained elevated blood pressure and losartan was started and titrated  with better control. Pt presented as well episodes of mild hypoglycemia and Humulin was adjusted.  Pt presented clinical improvement, responded appropriately to IV and then PO diuresis, cleared for home discharge w/ Lasix 40 daily and losartan 75 daily. Initial PT evaluation recommended discharge to [facility]. Pt declined and continued to work with PT with better mobility once the volume overload improved.  Pt will continue home PT.     Pt seen and examined the day of discharge, stable to go home.      CONSULTANTS:   Cardio Dr. Patel ID Dr. Chan Endo Dr. Perlman  Diabetes NP    TIME SPENT:  I, the attending physician, was physically present for the key portions of the evaluation and management (E/M) service provided. The total amount of time spent reviewing the hospital notes, laboratory values, imaging findings, assessing/counseling the patient, discussing with consultant physicians, social work, nursing staff was 50 minutes    ---  Primary care provider was made aware of plan for discharge:      [  ] NO     [ x] YES

## 2024-01-24 NOTE — PROGRESS NOTE ADULT - ASSESSMENT
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 Cardiology called for SOB. Dr. Ellis: Cardio.     A fib/Flutter, HTN, Cardiomyopathy, mod LVSD, Pulm HTN, PPM  - presents with shortness of breath, chest pressure and LE edema  - She saw her cardiologist and was started on lasix 20 mg PO daily last week.   - CXR with diffuse mild congestive changes  - BNP 1029  - Known CHF w/ mod LVSD and pulm HTN   - Echo: TTE 2/9/22 LVEF 40-45%, LA 5.3cm, mild con LVH, mod inferior and posterior hypokinesis, mod MR, trace AI, mild PI, mod TR, RVSP 76   - Technically difficult ECHO showed mildly decreased w/ EF 45 to 50 %. Global left ventricular hypokinesis, mild MR< mod AS, mod to sev TR, PASP 78,  severe pulmonary hypertension  - S/p IV Lasix 40mg IV BID,   - Continue Lasix 40 mg PO BID  - Monitor strict i/o and daily weight     - EKG showed A paced rhythm/ Aflib @ 61  - Troponin HsI 50.7  - No evidence of any active ischemia     - known A fib  - Continue Coreg and digoxin  - Continue Xarelto    - BP acceptable 130-160s   - Continue increased Losartan 75 mg     - Monitor and replete lytes, keep K>4, Mg>2.  - Will continue to follow.    Flaquita Hansen, MS FNP, AGACNP  Nurse Practitioner- Cardiology   Please call on TEAMS

## 2024-01-24 NOTE — PROGRESS NOTE ADULT - SUBJECTIVE AND OBJECTIVE BOX
[facility] -- Josue Moore, Kenneth Gonzalez Savella, Goodger, Cohen: Office # [phone]    Follow Up:  LE edema     Subjective/Observations: Patient seen and examined. Patient awake, alert, resting in bed. No complaints of chest pain, dyspnea, palpitations or dizziness. No signs of orthopnea or PND. Tolerating room air. + LE edema, improving     REVIEW OF SYSTEMS: All other review of systems are negative unless indicated above    PAST MEDICAL & SURGICAL HISTORY:  Hypothyroid      DM (diabetes mellitus)      HTN (hypertension)      Atrial fibrillation and flutter  s/p ablation      Bradycardia  s/p MDT PPM      H/O pulmonary hypertension      H/O hyperparathyroidism      H/O cardiomyopathy      Cardiac pacemaker      H/O cardiac radiofrequency ablation      H/O carpal tunnel repair      History of parathyroid surgery    MEDICATIONS  (STANDING):  carvedilol 12.5 milliGRAM(s) Oral every 12 hours  clotrimazole 1% Cream 1 Application(s) Topical two times a day  dextrose 5%. 1000 milliLiter(s) (50 mL/Hr) IV Continuous <Continuous>  dextrose 5%. 1000 milliLiter(s) (100 mL/Hr) IV Continuous <Continuous>  dextrose 5%. 1000 milliLiter(s) (100 mL/Hr) IV Continuous <Continuous>  dextrose 5%. 1000 milliLiter(s) (50 mL/Hr) IV Continuous <Continuous>  dextrose 5%. 1000 milliLiter(s) (50 mL/Hr) IV Continuous <Continuous>  dextrose 50% Injectable 25 Gram(s) IV Push once  dextrose 50% Injectable 12.5 Gram(s) IV Push once  dextrose 50% Injectable 25 Gram(s) IV Push once  dextrose 50% Injectable 25 Gram(s) IV Push once  dextrose 50% Injectable 12.5 Gram(s) IV Push once  digoxin     Tablet 250 MICROGram(s) Oral daily  furosemide    Tablet 40 milliGRAM(s) Oral two times a day  glucagon  Injectable 1 milliGRAM(s) IntraMuscular once  glucagon  Injectable 1 milliGRAM(s) IntraMuscular once  insulin lispro (ADMELOG) corrective regimen sliding scale   SubCutaneous three times a day before meals  insulin regular (concentrated) human recombinant U-500 50 Unit(s) SubCutaneous two times a day with meals  levothyroxine 125 MICROGram(s) Oral daily  magnesium oxide 400 milliGRAM(s) Oral once  mupirocin 2% Ointment 1 Application(s) Topical two times a day  potassium chloride    Tablet ER 40 milliEquivalent(s) Oral once  rivaroxaban 20 milliGRAM(s) Oral with dinner    MEDICATIONS  (PRN):  acetaminophen     Tablet .. 650 milliGRAM(s) Oral every 6 hours PRN Mild Pain (1 - 3)  dextrose Oral Gel 15 Gram(s) Oral once PRN Blood Glucose LESS THAN 70 milliGRAM(s)/deciliter  zolpidem 5 milliGRAM(s) Oral at bedtime PRN Insomnia  zolpidem 5 milliGRAM(s) Oral at bedtime PRN Insomnia    Allergies  penicillin (Short breath; Rash)  statins (Vomiting)  vancomycin (Short breath; Rash)  erythromycin (Rash)    Vital Signs Last 24 Hrs  T(C): 36.4 (24 Jan 2024 04:12), Max: 36.9 (23 Jan 2024 12:53)  T(F): 97.5 (24 Jan 2024 04:12), Max: 98.5 (23 Jan 2024 12:53)  HR: 60 (24 Jan 2024 04:12) (60 - 63)  BP: 163/74 (24 Jan 2024 04:12) (133/79 - 169/66)  BP(mean): --  RR: 18 (24 Jan 2024 04:12) (17 - 18)  SpO2: 90% (24 Jan 2024 04:12) (90% - 95%)    Parameters below as of 23 Jan 2024 20:56  Patient On (Oxygen Delivery Method): room air      I&O's Summary    23 Jan 2024 07:01  -  24 Jan 2024 07:00  --------------------------------------------------------  IN: 220 mL / OUT: 2150 mL / NET: -1930 mL          TELE: Not on telemetry   PHYSICAL EXAM:  Constitutional: NAD, awake and alert, Obese   HEENT: Moist Mucous Membranes, Anicteric  Pulmonary: Non-labored, breath sounds are clear bilaterally, Diminished at bases No wheezing, rales or rhonchi  Cardiovascular: Regular, S1 and S2, + murmurs, No rubs, gallops or clicks  Gastrointestinal:  soft, nontender, nondistended   Lymph: +1-2 peripheral edema w/ redness No lymphadenopathy.   Skin: No visible rashes or ulcers.  Psych:  Mood & affect appropriate        LABS: All Labs Reviewed:                        13.3   6.65  )-----------( 155      ( 23 Jan 2024 07:48 )             41.6                         13.2   5.74  )-----------( 162      ( 22 Jan 2024 08:25 )             42.0     23 Jan 2024 07:48    138    |  102    |  18     ----------------------------<  168    4.0     |  34     |  0.80   22 Jan 2024 18:10    141    |  102    |  19     ----------------------------<  114    3.6     |  36     |  0.75   22 Jan 2024 08:25    139    |  100    |  16     ----------------------------<  129    3.4     |  34     |  0.72     Ca    9.0        23 Jan 2024 07:48  Ca    9.1        22 Jan 2024 18:10  Ca    9.0        22 Jan 2024 08:25  Phos  4.1       23 Jan 2024 07:48  Phos  3.9       22 Jan 2024 08:25  Mg     1.8       23 Jan 2024 07:48  Mg     1.5       22 Jan 2024 08:25       Troponin I, High Sensitivity Result: 50.7 ng/L (01-20-24 @ 13:30)  Cholesterol: 124 mg/dL (01-21-24 @ 06:28)  HDL Cholesterol: 43 mg/dL (01-21-24 @ 06:28)  Triglycerides, Serum: 90 mg/dL (01-21-24 @ 06:28)    12 Lead ECG:   Ventricular Rate 61 BPM    QRS Duration 168 ms    Q-T Interval 456 ms    QTC Calculation(Bazett) 459 ms    R Axis -54 degrees    T Axis 111 degrees    Diagnosis Line A-paced  Wide QRS rhythm  Right bundle branch block  Left anterior fascicular block  *** Bifascicular block ***  Left ventricular hypertrophy with repolarization abnormality ( R in aVL )  Abnormal ECG  No previous ECGs available  Confirmed by Shahrzad Drake ([id number]) on 1/20/2024 2:51:23 PM (01-20-24 @ 13:08)      TRANSTHORACIC ECHOCARDIOGRAM REPORT  ________________________________________________________________________________                                      _______       Pt. Name:       ROSITA GUTIERREZ Study Date:    1/22/2024  MRN:            [id number]          YOB: 1950  Accession #:    [id number]         Age:           73 years  Account#:       [id number]        Gender:        F  Heart Rate:                       Height:        ( )  Rhythm:                           Weight:   220.46 lb (100.00 kg)  Blood Pressure: 160/61 mmHg       BSA/BMI:       2.15 m² /  ________________________________________________________________________________________  Referring Physician:    [id number] Lexx Cuenca  Interpreting Physician: Serafin Cooper  Primary Sonographer:    Bernabe Heredia    CPT:               ECHO TTE WO CON COMP W DOPP - 68727.m  Indication(s):     Dyspnea, unspecified - R06.00  Procedure:         Transthoracic echocardiogram with 2-D, M-mode and complete          spectral and color flow Doppler.  Ordering Location: [facility]  Admission Status:  Inpatient  Study Information: Image quality for this study is technically difficult.    _______________________________________________________________________________________     CONCLUSIONS:      1. Technically difficult image quality.   2. Left ventricular systolic function is mildly decreased with an ejection fraction visually estimated at 45 to 50 %. Global left ventricular hypokinesis.   3. Based on visualassessment, the right ventricle appears mildly enlarged. borderline reduced systolic function.   4. Device lead is visualized in the right heart.   5. The left atrium is moderately dilated.   6. The right atrium is dilated in size.   7. Symmetric mitral valve leaflet tethering.   8. Mild mitral regurgitation.   9. Trileaflet aortic valve with reduced systolic excursion. There is calcification of the aortic valve leaflets. moderate aortic stenosis.  10. The DEVYN is estimated at 1.1sqcm.  11. Moderate to severe tricuspid regurgitation.  12. Estimated pulmonary artery systolic pressure is 78 mmHg, consistent with severe pulmonary hypertension.  13. The inferior vena cava is dilated measuring 2.67 cm in diameter, (dilated >2.1cm) with abnormal inspiratory collapse (abnormal <50%) consistent with elevated right atrial pressure (~15, range 10-20mmHg).    ________________________________________________________________________________________  FINDINGS:     Left Ventricle:  Left ventricular systolic function is mildly decreased with an ejection fraction visually estimated at 45 to 50%. There is global left ventricular hypokinesis.     Right Ventricle:  Based on visual assessment, the right ventricle appears mildly enlarged. Borderline reducedsystolic function. Tricuspid annular plane systolic excursion (TAPSE) is 2.3 cm (normal >=1.7 cm). A device lead is visualized in the right heart.     Left Atrium:  The left atrium is moderately dilated with an indexed volume of 45.99 ml/m².     Right Atrium:  The right atrium is dilated in size.     Aortic Valve:  The aortic valve appears trileaflet with reduced systolic excursion. There is calcification of the aortic valve leaflets. There is moderate aortic stenosis. The peak transaortic velocity is 2.61 m/s, peak transaortic gradient is 27.2 mmHg and mean transaortic gradient is 13.0 mmHg with an LVOT/aortic valve VTI ratio of 0.31. The aortic valve area is estimated at 1.09 cm² by the continuity equation. The DEVYN is estimated at 1.1sqcm. There is trace aortic regurgitation.     Mitral Valve:  There is calcification of the mitral valve annulus. Mitral valve leaflets are diffusely calcified. There is symmetric leaflet tethering. There is mild mitral regurgitation.     Tricuspid Valve:  Structurally normal tricuspid valve with normal leaflet excursion. There is moderate to severe tricuspid regurgitation. Estimated pulmonary artery systolic pressure is 78 mmHg, consistent with severe pulmonary hypertension.     Pulmonic Valve:  The pulmonic valve was not well visualized. There is mild pulmonic regurgitation.     Aorta:  The aortic root at the sinuses of Valsalva is normal in size, measuring 3.00 cm (indexed 1.39 cm/m²). The ascending aorta diameter is normal in size, measuring 2.60 cm (indexed 1.21 cm/m²).     Systemic Veins:  The inferior vena cava is dilated measuring 2.67 cm in diameter, (dilated >2.1cm) with abnormal inspiratory collapse (abnormal <50%) consistent with elevated right atrial pressure (~15, range 10-20mmHg).  ____________________________________________________________________  QUANTITATIVE DATA:  Left Ventricle Measurements: (Indexed to BSA)     IVSd (2D):   1.4 cm  LVPWd (2D):  1.3 cm  LVIDd (2D):  5.4 cm  LVIDs (2D):  4.5 cm  LV Mass:     314 g  145.8g/m²  Visualized LV EF%: 45 to 50%     MV E Vmax:    1.71 m/s  MV A Vmax:    0.75 m/s  MV E/A:       2.27  e' lateral:   6.42 cm/s  e' medial:    5.87 cm/s  E/e' lateral: 26.64  E/e' medial:  29.13  E/e' Average: 27.83  MV DT:        161 msec    Aorta Measurements: (normal range) (Indexed to BSA)     Sinuses of Valsalva: 3.00 cm (2.7 - 3.3 cm)  Ao Asc prox:         2.60 cm       Left Atrium Measurements: (Indexed to BSA)  LA Diam 2D: 5.00 cm    Right Ventricle Measurements:     TAPSE:            2.3 cm  RV Base (RVID1):  4.3 cm  RV Mid (RVID2):   3.2 cm  RV Major (RVID3): 7.5 cm       LVOT / RVOT/ Qp/Qs Data: (Indexed to BSA)  LVOT Diameter: 2.10 cm  LVOT Vmax:     1.02 m/s  LVOT VTI:      17.80 cm  LVOT SV:       61.7 ml  28.61 ml/m²    Aortic Valve Measurements:  AV Vmax:                2.6 m/s  AV Peak Gradient:       27.2 mmHg  AV Mean Gradient:       13.0 mmHg  AV VTI:                 56.6 cm  AV VTI Ratio:           0.31  AoV EOA, Contin:        1.09 cm²  AoV EOA, Contin i:      0.51 cm²/m²  AoV Dimensionless Index 0.31    Mitral Valve Measurements:     MV E Vmax: 1.7 m/s         MR Vmax:          5.05 m/s  MV A Vmax: 0.8 m/s         MR Peak Gradient: 102.0 mmHg  MV E/A:    2.3       Tricuspid Valve Measurements:     TR Vmax:      4.0 m/s  TR Peak Gradient: 62.7 mmHg  RA Pressure:      15 mmHg  PASP:             78 mmHg    ________________________________________________________________________________________  Electronically signed on 1/22/2024 at 5:39:34 PM by Serafin Cooper         *** Final ***

## 2024-01-24 NOTE — PROGRESS NOTE ADULT - SUBJECTIVE AND OBJECTIVE BOX
ROSITA GUTIERREZ is a 73yFemale , patient examined and chart reviewed.     INTERVAL HPI/ OVERNIGHT EVENTS:   No events. Afebrile.  NAD.    PAST MEDICAL & SURGICAL HISTORY:  Hypothyroid  DM (diabetes mellitus)  HTN (hypertension)  Atrial fibrillation and flutter  s/p ablation  Bradycardia  s/p MDT PPM  H/O pulmonary hypertension  H/O hyperparathyroidism  H/O cardiomyopathy  Cardiac pacemaker  H/O cardiac radiofrequency ablation  H/O carpal tunnel repair  History of parathyroid surgery    For details regarding the patient's social history, family history, and other miscellaneous elements, please refer the initial infectious diseases consultation and/or the admitting history and physical examination for this admission.    ROS:  CONSTITUTIONAL:  Negative fever or chills  EYES:  Negative  blurry vision or double vision  CARDIOVASCULAR:  Negative for chest pain or palpitations  RESPIRATORY:  Negative for cough, wheezing, or SOB   GASTROINTESTINAL:  Negative for nausea, vomiting, diarrhea, constipation, or abdominal pain  GENITOURINARY:  Negative frequency, urgency or dysuria  NEUROLOGIC:  No headache, confusion, dizziness, lightheadedness  All other systems were reviewed and are negative     ALLERGIES  penicillin (Short breath; Rash)  statins (Vomiting)  vancomycin (Short breath; Rash)  erythromycin (Rash)      Current inpatient medications :    ANTIBIOTICS/RELEVANT:    MEDICATIONS  (STANDING):  carvedilol 12.5 milliGRAM(s) Oral every 12 hours  clotrimazole 1% Cream 1 Application(s) Topical two times a day  dextrose 5%. 1000 milliLiter(s) (50 mL/Hr) IV Continuous <Continuous>  dextrose 5%. 1000 milliLiter(s) (50 mL/Hr) IV Continuous <Continuous>  dextrose 5%. 1000 milliLiter(s) (100 mL/Hr) IV Continuous <Continuous>  dextrose 5%. 1000 milliLiter(s) (50 mL/Hr) IV Continuous <Continuous>  dextrose 5%. 1000 milliLiter(s) (100 mL/Hr) IV Continuous <Continuous>  dextrose 50% Injectable 25 Gram(s) IV Push once  dextrose 50% Injectable 12.5 Gram(s) IV Push once  dextrose 50% Injectable 25 Gram(s) IV Push once  dextrose 50% Injectable 25 Gram(s) IV Push once  dextrose 50% Injectable 12.5 Gram(s) IV Push once  digoxin     Tablet 250 MICROGram(s) Oral daily  furosemide    Tablet 40 milliGRAM(s) Oral two times a day  glucagon  Injectable 1 milliGRAM(s) IntraMuscular once  glucagon  Injectable 1 milliGRAM(s) IntraMuscular once  insulin lispro (ADMELOG) corrective regimen sliding scale   SubCutaneous three times a day before meals  insulin regular (concentrated) human recombinant U-500 45 Unit(s) SubCutaneous two times a day before meals  levothyroxine 125 MICROGram(s) Oral daily  losartan 75 milliGRAM(s) Oral daily  losartan 75 milliGRAM(s) Oral daily  magnesium oxide 400 milliGRAM(s) Oral once  mupirocin 2% Ointment 1 Application(s) Topical two times a day  potassium chloride    Tablet ER 40 milliEquivalent(s) Oral once  rivaroxaban 20 milliGRAM(s) Oral with dinner    MEDICATIONS  (PRN):  acetaminophen     Tablet .. 650 milliGRAM(s) Oral every 6 hours PRN Mild Pain (1 - 3)  dextrose Oral Gel 15 Gram(s) Oral once PRN Blood Glucose LESS THAN 70 milliGRAM(s)/deciliter  zolpidem 5 milliGRAM(s) Oral at bedtime PRN Insomnia  zolpidem 5 milliGRAM(s) Oral at bedtime PRN Insomnia        Objective:  Vital Signs Last 24 Hrs  T(C): 36.4 (24 Jan 2024 11:36), Max: 36.4 (24 Jan 2024 04:12)  T(F): 97.6 (24 Jan 2024 11:36), Max: 97.6 (24 Jan 2024 11:36)  HR: 61 (24 Jan 2024 11:36) (60 - 63)  BP: 150/65 (24 Jan 2024 11:36) (133/79 - 163/74)  RR: 15 (24 Jan 2024 11:36) (15 - 18)  SpO2: 94% (24 Jan 2024 11:36) (90% - 94%)    Parameters below as of 23 Jan 2024 20:56  Patient On (Oxygen Delivery Method): room air        Physical Exam:  General: no acute distress  Neck: supple, trachea midline  Lungs: clear, no wheeze/rhonchi  Cardiovascular: regular rate and rhythm, S1 S2  Abdomen: soft, nontender,  bowel sounds normal  Neurological: alert and oriented x3  Skin: no rash  Extremities: Decreasing edema chronic stasis  Drainage resolved ulcer c/d/i      LABS:                        12.8   5.33  )-----------( 148      ( 24 Jan 2024 07:05 )             40.8   01-24    138  |  101  |  18  ----------------------------<  163<H>  3.6   |  36<H>  |  0.63    Ca    8.6      24 Jan 2024 07:05  Phos  4.3     01-24  Mg     1.7     01-24        MICROBIOLOGY:    Culture - Blood (collected 20 Jan 2024 13:30)  Source: .Blood Blood-Peripheral  Preliminary Report (21 Jan 2024 22:02):    No growth at 24 hours    Culture - Blood (collected 20 Jan 2024 13:25)  Source: .Blood Blood-Peripheral  Preliminary Report (21 Jan 2024 22:02):    No growth at 24 hours    RADIOLOGY & ADDITIONAL STUDIES:  ACC: [id number] EXAM:  US DPLX LWR EXT VEINS COMPL BI   ORDERED BY:   RAZA JESSICA     PROCEDURE DATE:  01/20/2024          INTERPRETATION:  CLINICAL INFORMATION: Bilateral leg swelling.    COMPARISON: None available.    TECHNIQUE: Duplex sonography of the BILATERAL LOWER extremity veins with   color and spectral Doppler, with and without compression.    FINDINGS:    RIGHT:  Normal compressibility of the RIGHT common femoral, femoral and popliteal   veins.  Doppler examination shows normal spontaneous and phasic flow.  Limited visualized of the RIGHT calf veins. No RIGHT calf vein thrombosis   detected.    LEFT:  Normal compressibility of the LEFT common femoral, femoral and popliteal   veins.  Doppler examination shows normal spontaneous and phasic flow.  Limited visualized of the LEFT calf veins. No LEFT calf vein thrombosis   detected.    IMPRESSION:  No evidence of deep venous thrombosis in either lower extremity.      ACC: [id number] EXAM:  XR CHEST PORTABLE IMMED 1V   ORDERED BY: RAZA JESSICA     PROCEDURE DATE:  01/20/2024          INTERPRETATION:  AP chest on January 20, 2024 at 1:17 PM. Patient has   sepsis.    Heart enlargement and left-sided pacemaker again noted.    There are diffuse mild congestive changes somewhat increased from March 8, 2012.    IMPRESSION: Mild CHF at this time. Stable cardiac findings.      Assessment :   74YO F with PMHx of atrial fibrillation on xarelto, atrial flutter s/p ablation, bradycardia and syncope s/p ppm implant, HTN, DM, cardiomyopathy with moderate LV dysfunction, hypothyroid, hyperparathyroidism, depression, pulmonary HTN presents to ED with progressively worsening SOB and worsening wander LE edema with weeping b/l LE wounds.  Fluid overload with CHD exacerbation wander LE edema/anasarca  CXR with CHF  No concern for leg cellulitis  Cultures NGTD  Clinically improving    Plan :   Monitor off antibiotics  Trend temps and cbc  Diurese per cardio  Elevate leg  Local wound care  Pulm toileting  Stable from ID standpoint  Dc planning per primary team    Continue with present regiment.  Appropriate use of antibiotics and adverse effects reviewed.      > 35 minutes were spent in direct patient care reviewing notes, medications ,labs data/ imaging , discussion with multidisciplinary team.    Thank you for allowing me to participate in care of your patient .    Gina Driscoll MD  Infectious Disease  [phone]

## 2024-01-24 NOTE — CASE MANAGEMENT PROGRESS NOTE - NSCMPROGRESSNOTE_GEN_ALL_CORE
Patient declining [facility] and wants to go home with [facility]. PT worked with patient today and now recommending HCPT and RW. Pt chose [facility], referral sent.  RW ref sent to [facility]. RW RX left in chart for Dr. Tiwari to sign. CM will attach to referral when completed. Anticipate DC home Friday as per Dr. Tiwari. Met with patient at bedside. Patient agrees with plan but states she only has 1 friend who can possibly drive her home. Patient requesting if hospital can cover taxi home if her friend cannot . Patient requesting [facility] wheels which [facility] is sending referral. CM will continue to follow.

## 2024-01-24 NOTE — PROGRESS NOTE ADULT - NEGATIVE RESPIRATORY AND THORAX SYMPTOMS
Patient: Gricelda Sabillon    Procedure(s):  Upper Endoscopy, Endoscopic Ultrasound, Endoscopic Mucosal Resection  Endoscopic Retrograde Cholangiopancreatogram    Diagnosis: Ampullary Adenoma  Diagnosis Additional Information: No value filed.    Anesthesia Type:   No value filed.     Note:  Airway :Face Mask  Patient transferred to:PACU  Handoff Report: Identifed the Patient, Identified the Reponsible Provider, Reviewed the pertinent medical history, Discussed the surgical course, Reviewed Intra-OP anesthesia mangement and issues during anesthesia, Set expectations for post-procedure period and Allowed opportunity for questions and acknowledgement of understanding      Vitals: (Last set prior to Anesthesia Care Transfer)    CRNA VITALS  2/28/2019 1908 - 2/28/2019 1942      2/28/2019             Resp Rate (observed):  29                Electronically Signed By: Charles Patrick Schlatter, APRN CRNA  February 28, 2019  7:42 PM   no wheezing/no dyspnea/no cough

## 2024-01-24 NOTE — PROGRESS NOTE ADULT - SUBJECTIVE AND OBJECTIVE BOX
Patient is a 73y old  Female who presents with a chief complaint of b/l LE wounds (24 Jan 2024 08:24)      INTERVAL HPI/OVERNIGHT EVENTS: Patient seen and examined at bedside. No overnight events occurred. Patient reports feeling better overall and expresses desire to go home. Improved LE swelling, chest pressure, and SOB (no longer at rest). Has not been able to walk much with PT. Denies fevers, chills, headache, lightheadedness, abdominal pain, n/v/d/c.    MEDICATIONS  (STANDING):  carvedilol 12.5 milliGRAM(s) Oral every 12 hours  clotrimazole 1% Cream 1 Application(s) Topical two times a day  dextrose 5%. 1000 milliLiter(s) (50 mL/Hr) IV Continuous <Continuous>  dextrose 5%. 1000 milliLiter(s) (50 mL/Hr) IV Continuous <Continuous>  dextrose 5%. 1000 milliLiter(s) (100 mL/Hr) IV Continuous <Continuous>  dextrose 5%. 1000 milliLiter(s) (100 mL/Hr) IV Continuous <Continuous>  dextrose 5%. 1000 milliLiter(s) (50 mL/Hr) IV Continuous <Continuous>  dextrose 50% Injectable 25 Gram(s) IV Push once  dextrose 50% Injectable 25 Gram(s) IV Push once  dextrose 50% Injectable 25 Gram(s) IV Push once  dextrose 50% Injectable 12.5 Gram(s) IV Push once  dextrose 50% Injectable 12.5 Gram(s) IV Push once  digoxin     Tablet 250 MICROGram(s) Oral daily  furosemide    Tablet 40 milliGRAM(s) Oral two times a day  glucagon  Injectable 1 milliGRAM(s) IntraMuscular once  glucagon  Injectable 1 milliGRAM(s) IntraMuscular once  insulin lispro (ADMELOG) corrective regimen sliding scale   SubCutaneous three times a day before meals  insulin regular (concentrated) human recombinant U-500 50 Unit(s) SubCutaneous two times a day with meals  levothyroxine 125 MICROGram(s) Oral daily  losartan 75 milliGRAM(s) Oral daily  magnesium oxide 400 milliGRAM(s) Oral once  mupirocin 2% Ointment 1 Application(s) Topical two times a day  potassium chloride    Tablet ER 40 milliEquivalent(s) Oral once  rivaroxaban 20 milliGRAM(s) Oral with dinner    MEDICATIONS  (PRN):  acetaminophen     Tablet .. 650 milliGRAM(s) Oral every 6 hours PRN Mild Pain (1 - 3)  dextrose Oral Gel 15 Gram(s) Oral once PRN Blood Glucose LESS THAN 70 milliGRAM(s)/deciliter  zolpidem 5 milliGRAM(s) Oral at bedtime PRN Insomnia  zolpidem 5 milliGRAM(s) Oral at bedtime PRN Insomnia      Allergies    penicillin (Short breath; Rash)  statins (Vomiting)  vancomycin (Short breath; Rash)  erythromycin (Rash)    Intolerances        REVIEW OF SYSTEMS:  CONSTITUTIONAL: No fever or chills  HEENT:  No headache, no sore throat  RESPIRATORY: No cough, wheezing; no SOB at rest; +SALCIDO  CARDIOVASCULAR: No chest pain, palpitations  GASTROINTESTINAL: No abd pain, nausea, vomiting, or diarrhea  GENITOURINARY: No dysuria  NEUROLOGICAL: no focal weakness or dizziness  MUSCULOSKELETAL: leg swelling (improving); no myalgias      Vital Signs Last 24 Hrs  T(C): 36.4 (24 Jan 2024 04:12), Max: 36.9 (23 Jan 2024 12:53)  T(F): 97.5 (24 Jan 2024 04:12), Max: 98.5 (23 Jan 2024 12:53)  HR: 60 (24 Jan 2024 04:12) (60 - 63)  BP: 163/74 (24 Jan 2024 04:12) (133/79 - 169/66)  BP(mean): --  RR: 18 (24 Jan 2024 04:12) (17 - 18)  SpO2: 90% (24 Jan 2024 04:12) (90% - 95%)    Parameters below as of 23 Jan 2024 20:56  Patient On (Oxygen Delivery Method): room air      PHYSICAL EXAM:  GENERAL: NAD, on RA breathing well and normal O2 sat while sitting up, obese  HEENT: moist mucous membranes, anicteric  CHEST/LUNG: Decreased respiratory sounds in bases with clear sounds b/l upper lung fields, no rales, wheezes, or rhonchi appreciated  HEART:  RRR, S1, S2, +murmur   ABDOMEN:  BS+, soft, nontender, nondistended  MUSCULOSKELETAL: 2+ B/L LE pitting edema (improving), no cyanosis, or calf tenderness; ruptured blisters now scabbing over without surrounding warmth/tenderness  NEUROLOGIC: answers questions and follows commands appropriately    LABS:                        12.8   5.33  )-----------( 148      ( 24 Jan 2024 07:05 )             40.8     CBC Full  -  ( 24 Jan 2024 07:05 )  WBC Count : 5.33 K/uL  Hemoglobin : 12.8 g/dL  Hematocrit : 40.8 %  Platelet Count - Automated : 148 K/uL  Mean Cell Volume : 92.3 fl  Mean Cell Hemoglobin : 29.0 pg  Mean Cell Hemoglobin Concentration : 31.4 gm/dL  Auto Neutrophil # : x  Auto Lymphocyte # : x  Auto Monocyte # : x  Auto Eosinophil # : x  Auto Basophil # : x  Auto Neutrophil % : x  Auto Lymphocyte % : x  Auto Monocyte % : x  Auto Eosinophil % : x  Auto Basophil % : x    24 Jan 2024 07:05    138    |  101    |  18     ----------------------------<  163    3.6     |  36     |  0.63     Ca    8.6        24 Jan 2024 07:05  Phos  4.3       24 Jan 2024 07:05  Mg     1.7       24 Jan 2024 07:05        Urinalysis Basic - ( 24 Jan 2024 07:05 )    Color: x / Appearance: x / SG: x / pH: x  Gluc: 163 mg/dL / Ketone: x  / Bili: x / Urobili: x   Blood: x / Protein: x / Nitrite: x   Leuk Esterase: x / RBC: x / WBC x   Sq Epi: x / Non Sq Epi: x / Bacteria: x      CAPILLARY BLOOD GLUCOSE      POCT Blood Glucose.: 171 mg/dL (24 Jan 2024 07:59)  POCT Blood Glucose.: 81 mg/dL (24 Jan 2024 00:09)  POCT Blood Glucose.: 109 mg/dL (23 Jan 2024 20:21)  POCT Blood Glucose.: 69 mg/dL (23 Jan 2024 20:00)  POCT Blood Glucose.: 51 mg/dL (23 Jan 2024 19:48)  POCT Blood Glucose.: 85 mg/dL (23 Jan 2024 17:34)  POCT Blood Glucose.: 187 mg/dL (23 Jan 2024 12:27)        Culture - Blood (collected 01-20-24 @ 13:30)  Source: .Blood Blood-Peripheral  Preliminary Report (01-23-24 @ 22:01):    No growth at 72 Hours    Culture - Blood (collected 01-20-24 @ 13:25)  Source: .Blood Blood-Peripheral  Preliminary Report (01-23-24 @ 22:01):    No growth at 72 Hours        RADIOLOGY & ADDITIONAL TESTS:    Personally reviewed.     Consultant(s) Notes Reviewed:  [x] YES  [ ] NO

## 2024-01-25 ENCOUNTER — TRANSCRIPTION ENCOUNTER (OUTPATIENT)
Age: 74
End: 2024-01-25

## 2024-01-25 VITALS
OXYGEN SATURATION: 94 % | SYSTOLIC BLOOD PRESSURE: 147 MMHG | TEMPERATURE: 98 F | HEART RATE: 63 BPM | DIASTOLIC BLOOD PRESSURE: 53 MMHG | RESPIRATION RATE: 17 BRPM

## 2024-01-25 LAB
ANION GAP SERPL CALC-SCNC: 5 MMOL/L — SIGNIFICANT CHANGE UP (ref 5–17)
BUN SERPL-MCNC: 22 MG/DL — SIGNIFICANT CHANGE UP (ref 7–23)
CALCIUM SERPL-MCNC: 8.7 MG/DL — SIGNIFICANT CHANGE UP (ref 8.5–10.1)
CHLORIDE SERPL-SCNC: 98 MMOL/L — SIGNIFICANT CHANGE UP (ref 96–108)
CO2 SERPL-SCNC: 37 MMOL/L — HIGH (ref 22–31)
CREAT SERPL-MCNC: 0.86 MG/DL — SIGNIFICANT CHANGE UP (ref 0.5–1.3)
CULTURE RESULTS: SIGNIFICANT CHANGE UP
CULTURE RESULTS: SIGNIFICANT CHANGE UP
EGFR: 70 ML/MIN/1.73M2 — SIGNIFICANT CHANGE UP
GLUCOSE SERPL-MCNC: 256 MG/DL — HIGH (ref 70–99)
HCT VFR BLD CALC: 41.4 % — SIGNIFICANT CHANGE UP (ref 34.5–45)
HGB BLD-MCNC: 12.9 G/DL — SIGNIFICANT CHANGE UP (ref 11.5–15.5)
MAGNESIUM SERPL-MCNC: 2.1 MG/DL — SIGNIFICANT CHANGE UP (ref 1.6–2.6)
MCHC RBC-ENTMCNC: 28.7 PG — SIGNIFICANT CHANGE UP (ref 27–34)
MCHC RBC-ENTMCNC: 31.2 GM/DL — LOW (ref 32–36)
MCV RBC AUTO: 92.2 FL — SIGNIFICANT CHANGE UP (ref 80–100)
NRBC # BLD: 0 /100 WBCS — SIGNIFICANT CHANGE UP (ref 0–0)
PHOSPHATE SERPL-MCNC: 3.1 MG/DL — SIGNIFICANT CHANGE UP (ref 2.5–4.5)
PLATELET # BLD AUTO: 158 K/UL — SIGNIFICANT CHANGE UP (ref 150–400)
POTASSIUM SERPL-MCNC: 3.7 MMOL/L — SIGNIFICANT CHANGE UP (ref 3.5–5.3)
POTASSIUM SERPL-SCNC: 3.7 MMOL/L — SIGNIFICANT CHANGE UP (ref 3.5–5.3)
RBC # BLD: 4.49 M/UL — SIGNIFICANT CHANGE UP (ref 3.8–5.2)
RBC # FLD: 14.9 % — HIGH (ref 10.3–14.5)
SODIUM SERPL-SCNC: 140 MMOL/L — SIGNIFICANT CHANGE UP (ref 135–145)
SPECIMEN SOURCE: SIGNIFICANT CHANGE UP
SPECIMEN SOURCE: SIGNIFICANT CHANGE UP
WBC # BLD: 5.18 K/UL — SIGNIFICANT CHANGE UP (ref 3.8–10.5)
WBC # FLD AUTO: 5.18 K/UL — SIGNIFICANT CHANGE UP (ref 3.8–10.5)

## 2024-01-25 PROCEDURE — 99239 HOSP IP/OBS DSCHRG MGMT >30: CPT

## 2024-01-25 PROCEDURE — 99232 SBSQ HOSP IP/OBS MODERATE 35: CPT

## 2024-01-25 RX ORDER — FUROSEMIDE 40 MG
1 TABLET ORAL
Qty: 30 | Refills: 0
Start: 2024-01-25

## 2024-01-25 RX ORDER — INSULIN HUMAN 100 [IU]/ML
50 INJECTION, SOLUTION SUBCUTANEOUS
Qty: 0 | Refills: 0 | DISCHARGE
Start: 2024-01-25

## 2024-01-25 RX ORDER — POTASSIUM CHLORIDE 20 MEQ
1 PACKET (EA) ORAL
Qty: 30 | Refills: 0
Start: 2024-01-25 | End: 2024-02-23

## 2024-01-25 RX ORDER — LIDOCAINE 4 G/100G
1 CREAM TOPICAL ONCE
Refills: 0 | Status: COMPLETED | OUTPATIENT
Start: 2024-01-25 | End: 2024-01-25

## 2024-01-25 RX ORDER — CARVEDILOL PHOSPHATE 80 MG/1
1 CAPSULE, EXTENDED RELEASE ORAL
Qty: 0 | Refills: 0 | DISCHARGE
Start: 2024-01-25

## 2024-01-25 RX ORDER — LOSARTAN POTASSIUM 100 MG/1
1.5 TABLET, FILM COATED ORAL
Qty: 45 | Refills: 0
Start: 2024-01-25 | End: 2024-02-23

## 2024-01-25 RX ADMIN — Medication 40 MILLIGRAM(S): at 05:46

## 2024-01-25 RX ADMIN — Medication 3: at 08:09

## 2024-01-25 RX ADMIN — LOSARTAN POTASSIUM 75 MILLIGRAM(S): 100 TABLET, FILM COATED ORAL at 05:45

## 2024-01-25 RX ADMIN — Medication 1 APPLICATION(S): at 05:46

## 2024-01-25 RX ADMIN — MUPIROCIN 1 APPLICATION(S): 20 OINTMENT TOPICAL at 05:46

## 2024-01-25 RX ADMIN — INSULIN HUMAN 45 UNIT(S): 100 INJECTION, SOLUTION SUBCUTANEOUS at 08:10

## 2024-01-25 RX ADMIN — CARVEDILOL PHOSPHATE 12.5 MILLIGRAM(S): 80 CAPSULE, EXTENDED RELEASE ORAL at 05:46

## 2024-01-25 RX ADMIN — Medication 650 MILLIGRAM(S): at 00:54

## 2024-01-25 RX ADMIN — Medication 40 MILLIGRAM(S): at 12:01

## 2024-01-25 RX ADMIN — Medication 125 MICROGRAM(S): at 05:46

## 2024-01-25 RX ADMIN — LIDOCAINE 1 PATCH: 4 CREAM TOPICAL at 08:35

## 2024-01-25 RX ADMIN — Medication 250 MICROGRAM(S): at 06:00

## 2024-01-25 RX ADMIN — Medication 3: at 12:01

## 2024-01-25 NOTE — DISCHARGE NOTE NURSING/CASE MANAGEMENT/SOCIAL WORK - NSDCFUADDAPPT_GEN_ALL_CORE_FT
please follow up with wound care center in 1-2 weeks for monitoring of lower extremity wounds.  please follow up with wound care center in 1-2 weeks for monitoring of lower extremity wounds.     Appointment scheduled with Dr. Pisano (Primary Care Physician) 2/8/24 at 1:15pm  An appointment has been scheduled for a transitional care management NP to do a home visit on Monday 1/29/24 between 11am-1pm.

## 2024-01-25 NOTE — PROGRESS NOTE ADULT - PROBLEM SELECTOR PLAN 1
- acute on chronic systolic CHF  - At Presentation: Pt with progressive LE edema for the past 3 weeks to groin level and increased SOB with minimal activity. Seen by her cardio outpatient about 2 weeks ago and started Lasix 20mg qd but pt was able to start it about 1 week ago with no significant improvement.  - severely volume overloaded (especially peripherally) on exam. Normal O2 sats on RA. Improving LE edema from admission.  - CXR/CCT: Signs of fluid overload noted on R>L lung parenchyma c/w CHF exacerbation vs PNA  - pBNP 1029 in the setting of obesity.   - TTE 2/9/22 LVEF 40-45%, mild con LVH, mod inferior and posterior hypokinesis, mod MR, trace AI, mild PI, mod TR, RVSP 76. Repeat TTE 1/21/24 demonstrating similar findings w/ LVEF 35% and akinetic segments.  - Continue lasix 40mg PO BID per cardio. Monitor I&Os  - replete electrolytes -- hypokalemia and hypomagnesemia   - Continue coreg and digoxin. Digoxin level within normal range.   - Continue losartan 75mg po daily  - cardio consulted (Dr. Drake group), appreciate recs - acute on chronic systolic CHF  - At Presentation: Pt with progressive LE edema for the past 3 weeks to groin level and increased SOB with minimal activity. Seen by her cardio outpatient about 2 weeks ago and started Lasix 20mg qd but pt was able to start it about 1 week ago with no significant improvement.  - severely volume overloaded (especially peripherally) on exam. Normal O2 sats on RA. Improving LE edema from admission.  - CXR/CCT: Signs of fluid overload noted on R>L lung parenchyma c/w CHF exacerbation vs PNA  - pBNP 1029 in the setting of obesity.   - TTE 2/9/22 LVEF 40-45%, mild con LVH, mod inferior and posterior hypokinesis, mod MR, trace AI, mild PI, mod TR, RVSP 76. Repeat TTE 1/21/24 demonstrating similar findings w/ LVEF 35% and akinetic segments.  - Can D/C w/ lasix 40mg PO daily per cardio. Monitor I&Os  - replete electrolytes -- hypokalemia and hypomagnesemia   - Continue coreg and digoxin. Digoxin level within normal range.   - Can D/C w/ losartan 75mg po daily per cardio.  - cardio consulted (Dr. Drake group), appreciate recs

## 2024-01-25 NOTE — PROGRESS NOTE ADULT - ATTENDING COMMENTS
Patient seen at bedside.   ambulated with PT.   reports that she is feeling better.   reports episodes of hypoglycemia at home as well at times.     No rales on exam   + 1 edema bilateral lower extremity   + scabs noted bilateral shin     A/P  Acute on chronic systolic CHF   afib on Xarelto, s/p ablation  bradycardia s/p PPM    - lasix changed to po 20mg BID     - continue I+Os     - cont coreg and dig    - cont losartan 75mg daily. BP was elevated    - cardio following     type 2 DM    - hgba1c 8.8%    - on humulin R. changed to 45units BID     - endo NP following     hypothyroidism    - cont levothyroxine     DVT proph: Xarelto     discharge planning. patient would like to go home.
see below
Patient seen at bedside.   Reports feeling better  ambulated with RW  leg swelling improved.     stable for discharge with outpt follow up.   has an appointment with her PMD next week.   Follow up with cardiology and endo  continue humulin 50 units BID
see below
see below

## 2024-01-25 NOTE — PROGRESS NOTE ADULT - NS ATTEND AMEND GEN_ALL_CORE FT
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     decomp hf in the setting of sev pulm htn and mild lv dysfxn     feeling more dry, can try and change lasix to po  BP uncontrolled, would increase Losartan and continue to uptitrate  Continue Coreg  Known AF, BB as above  Continue Xarelto.
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 Cardiology called for SOB. Dr. Ellis: Cardio.     - presents with shortness of breath, chest pressure and LE edema  - CXR with diffuse mild congestive changes  - BNP 1029  - Known CHF w/ mod LVSD and pulm HTN   - Technically difficult ECHO showed mildly decreased w/ EF 45 to 50 %. Global left ventricular hypokinesis, mild MR< mod AS, mod to sev TR, PASP 78,  severe pulmonary hypertension  - S/p IV Lasix 40mg IV BID,   - Continue Lasix 40 mg PO BID  - Continue Coreg and digoxin  - Continue Xarelto  - Continue increased Losartan 75 mg
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     Presenting in [facility]  Echo: TTE 2/9/22 LVEF 40-45%, LA 5.3cm, mild con LVH, mod inferior and posterior hypokinesis, mod MR, trace AI, mild PI, mod TR, RVSP 76  Would increase LAsix to 60mg IV BID after aggressive K repletion  BP uncontrolled, would increase Losartan and continue to uptitrate  Continue Coreg  Known AF, BB as above  Continue Xarelto
I have personally seen and examined the patient in detail.  I have spoken to the provider regarding the assessment and plan of care.  I have made changes to the note accordingly.
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 Cardiology called for SOB. Dr. Ellis: Cardio.     - presents with shortness of breath, chest pressure and LE edema  - CXR with diffuse mild congestive changes  - BNP 1029  - Known CHF w/ mod LVSD and pulm HTN   - Technically difficult ECHO showed mildly decreased w/ EF 45 to 50 %. Global left ventricular hypokinesis, mild MR< mod AS, mod to sev TR, PASP 78,  severe pulmonary hypertension  - S/p IV Lasix 40mg IV BID,   - Continue Lasix 40 mg PO BID  - Continue Coreg and digoxin  - Continue Xarelto  - Continue increased Losartan 75 mg. can increase to 100mg Qday for bp control.

## 2024-01-25 NOTE — PATIENT CHOICE NOTE. - NSPTCHOICESTATE_GEN_ALL_CORE

## 2024-01-25 NOTE — PHARMACOTHERAPY INTERVENTION NOTE - COMMENTS
[facility] CHF Counseling and Discharge Reconciliation    Discussed current and likely discharge medication list with the patient including indications, directions, and adverse effects to monitor. Additionally discussed CHF action plan with the patient including the importance of daily weights and salt and fluid restriction.    Counseling materials provided: [employer] CHF action plan  Time spent counseling: 10 minutes

## 2024-01-25 NOTE — DISCHARGE NOTE NURSING/CASE MANAGEMENT/SOCIAL WORK - PATIENT PORTAL LINK FT
You can access the FollowMyHealth Patient Portal offered by [facility] by registering at the following website: [email or web address]. By joining [employer]’s FollowMyHealth portal, you will also be able to view your health information using other applications (apps) compatible with our system.

## 2024-01-25 NOTE — PROGRESS NOTE ADULT - ASSESSMENT
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 Cardiology called for SOB. Dr. Ellis: Cardio.     A fib/Flutter, HTN, Cardiomyopathy, mod LVSD, Pulm HTN, PPM  - presents with shortness of breath, chest pressure and LE edema  - She saw her cardiologist and was started on Lasix 20 mg PO daily last week.   - CXR with diffuse mild congestive changes  - BNP 1029  - Known CHF w/ mod LVSD and pulm HTN   - Echo: TTE 2/9/22 LVEF 40-45%, LA 5.3cm, mild con LVH, mod inferior and posterior hypokinesis, mod MR, trace AI, mild PI, mod TR, RVSP 76   - Technically difficult ECHO showed mildly decreased w/ EF 45 to 50 %. Global left ventricular hypokinesis, mild MR< mod AS, mod to sev TR, PASP 78,  severe pulmonary hypertension  - S/p IV Lasix 40mg IV BID  - Continue Lasix 40 mg PO BID  - Creatinine:  <--0.63; Bicarb: 36  - Monitor strict i/o and daily weight     - EKG showed A paced rhythm/ Afib @ 61  - Troponin HsI 50.7  - No evidence of any active ischemia     - known A fib  - Continue Coreg and digoxin  - Continue Xarelto    - BP acceptable 140-150s   - Continue Losartan 75 mg     - Monitor and replete lytes, keep K>4, Mg>2.  - Will continue to follow.    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 Cardiology called for SOB. Dr. Ellis: Cardio.     A fib/Flutter, HTN, Cardiomyopathy, mod LVSD, Pulm HTN, PPM  - Pt presents with shortness of breath, chest pressure and LE edema  - She saw her cardiologist and was started on Lasix 20 mg PO daily last week.   - CXR with diffuse mild congestive changes  - BNP 1029  - Known CHF w/ mod LVSD and pulm HTN   - Echo: TTE 2/9/22 LVEF 40-45%, LA 5.3cm, mild con LVH, mod inferior and posterior hypokinesis, mod MR, trace AI, mild PI, mod TR, RVSP 76   - Technically difficult ECHO showed mildly decreased w/ EF 45 to 50 %. Global left ventricular hypokinesis, mild MR< mod AS, mod to sev TR, PASP 78,  severe pulmonary hypertension  - S/p IV Lasix 40mg IV BID  - Continue Lasix 40 mg PO BID  - Creatinine:  <--0.63; Bicarb: 36  - Monitor strict i/o and daily weight     - EKG showed A paced rhythm/ Afib @ 61  - Troponin HsI 50.7  - No evidence of any active ischemia     - known A fib  - Continue Coreg and digoxin  - Continue Xarelto    - BP acceptable 140-150s   - Continue Losartan 75 mg     - D/c planning per primary team.   - Monitor and replete lytes, keep K>4, Mg>2.  - Will continue to follow.    Flaquita Hansen, MS FNP, AGACNP  Nurse Practitioner- Cardiology   Please call on TEAMS

## 2024-01-25 NOTE — PROGRESS NOTE ADULT - SUBJECTIVE AND OBJECTIVE BOX
Patient is a 73y old  Female who presents with a chief complaint of b/l LE wounds (25 Jan 2024 07:12)      INTERVAL HPI/OVERNIGHT EVENTS: Patient seen and examined at bedside. No overnight events occurred. Patient has no complaints at this time. Denies fevers, chills, headache, lightheadedness, chest pain, dyspnea, abdominal pain, n/v/d/c.    MEDICATIONS  (STANDING):  carvedilol 12.5 milliGRAM(s) Oral every 12 hours  clotrimazole 1% Cream 1 Application(s) Topical two times a day  dextrose 5%. 1000 milliLiter(s) (50 mL/Hr) IV Continuous <Continuous>  dextrose 5%. 1000 milliLiter(s) (100 mL/Hr) IV Continuous <Continuous>  dextrose 5%. 1000 milliLiter(s) (50 mL/Hr) IV Continuous <Continuous>  dextrose 5%. 1000 milliLiter(s) (50 mL/Hr) IV Continuous <Continuous>  dextrose 5%. 1000 milliLiter(s) (100 mL/Hr) IV Continuous <Continuous>  dextrose 50% Injectable 25 Gram(s) IV Push once  dextrose 50% Injectable 12.5 Gram(s) IV Push once  dextrose 50% Injectable 12.5 Gram(s) IV Push once  dextrose 50% Injectable 25 Gram(s) IV Push once  dextrose 50% Injectable 25 Gram(s) IV Push once  digoxin     Tablet 250 MICROGram(s) Oral daily  furosemide    Tablet 40 milliGRAM(s) Oral two times a day  glucagon  Injectable 1 milliGRAM(s) IntraMuscular once  glucagon  Injectable 1 milliGRAM(s) IntraMuscular once  insulin lispro (ADMELOG) corrective regimen sliding scale   SubCutaneous three times a day before meals  insulin regular (concentrated) human recombinant U-500 45 Unit(s) SubCutaneous two times a day before meals  levothyroxine 125 MICROGram(s) Oral daily  losartan 75 milliGRAM(s) Oral daily  magnesium oxide 400 milliGRAM(s) Oral once  mupirocin 2% Ointment 1 Application(s) Topical two times a day  potassium chloride    Tablet ER 40 milliEquivalent(s) Oral once  rivaroxaban 20 milliGRAM(s) Oral with dinner    MEDICATIONS  (PRN):  acetaminophen     Tablet .. 650 milliGRAM(s) Oral every 6 hours PRN Mild Pain (1 - 3)  dextrose Oral Gel 15 Gram(s) Oral once PRN Blood Glucose LESS THAN 70 milliGRAM(s)/deciliter  zolpidem 5 milliGRAM(s) Oral at bedtime PRN Insomnia  zolpidem 5 milliGRAM(s) Oral at bedtime PRN Insomnia      Allergies    penicillin (Short breath; Rash)  statins (Vomiting)  vancomycin (Short breath; Rash)  erythromycin (Rash)    Intolerances        REVIEW OF SYSTEMS:  CONSTITUTIONAL: No fever or chills  HEENT:  No headache, no sore throat  RESPIRATORY: No cough, wheezing; no SOB at rest; +SALCIDO  CARDIOVASCULAR: No chest pain, palpitations  GASTROINTESTINAL: No abd pain, nausea, vomiting, or diarrhea  GENITOURINARY: No dysuria  NEUROLOGICAL: no focal weakness or dizziness  MUSCULOSKELETAL: leg swelling (improving); no myalgias       Vital Signs Last 24 Hrs  T(C): 37.1 (25 Jan 2024 04:15), Max: 37.1 (25 Jan 2024 04:15)  T(F): 98.7 (25 Jan 2024 04:15), Max: 98.7 (25 Jan 2024 04:15)  HR: 60 (25 Jan 2024 04:15) (60 - 63)  BP: 152/79 (25 Jan 2024 04:15) (148/69 - 152/79)  BP(mean): --  RR: 18 (25 Jan 2024 04:15) (15 - 18)  SpO2: 94% (25 Jan 2024 04:15) (94% - 94%)    Parameters below as of 25 Jan 2024 04:15  Patient On (Oxygen Delivery Method): room air        PHYSICAL EXAM:  GENERAL: NAD, on RA breathing well and normal O2 sat while sitting up, obese  HEENT: moist mucous membranes, anicteric  CHEST/LUNG: Decreased respiratory sounds in bases with clear sounds b/l upper lung fields, no rales, wheezes, or rhonchi appreciated  HEART:  RRR, S1, S2, +murmur   ABDOMEN:  BS+, soft, nontender, nondistended  MUSCULOSKELETAL: 2+ B/L LE pitting edema (improving), no cyanosis, or calf tenderness; ruptured blisters now scabbing over without surrounding warmth/tenderness  NEUROLOGIC: answers questions and follows commands appropriately    LABS:    CBC Full  -  ( 24 Jan 2024 07:05 )  WBC Count : 5.33 K/uL  Hemoglobin : 12.8 g/dL  Hematocrit : 40.8 %  Platelet Count - Automated : 148 K/uL  Mean Cell Volume : 92.3 fl  Mean Cell Hemoglobin : 29.0 pg  Mean Cell Hemoglobin Concentration : 31.4 gm/dL  Auto Neutrophil # : x  Auto Lymphocyte # : x  Auto Monocyte # : x  Auto Eosinophil # : x  Auto Basophil # : x  Auto Neutrophil % : x  Auto Lymphocyte % : x  Auto Monocyte % : x  Auto Eosinophil % : x  Auto Basophil % : x      Ca    8.6        24 Jan 2024 07:05        Urinalysis Basic - ( 24 Jan 2024 07:05 )    Color: x / Appearance: x / SG: x / pH: x  Gluc: 163 mg/dL / Ketone: x  / Bili: x / Urobili: x   Blood: x / Protein: x / Nitrite: x   Leuk Esterase: x / RBC: x / WBC x   Sq Epi: x / Non Sq Epi: x / Bacteria: x      CAPILLARY BLOOD GLUCOSE      POCT Blood Glucose.: 258 mg/dL (25 Jan 2024 08:03)  POCT Blood Glucose.: 132 mg/dL (24 Jan 2024 20:58)  POCT Blood Glucose.: 189 mg/dL (24 Jan 2024 18:19)  POCT Blood Glucose.: 98 mg/dL (24 Jan 2024 16:42)  POCT Blood Glucose.: 184 mg/dL (24 Jan 2024 11:20)        Culture - Blood (collected 01-20-24 @ 13:30)  Source: .Blood Blood-Peripheral  Preliminary Report (01-24-24 @ 22:00):    No growth at 4 days    Culture - Blood (collected 01-20-24 @ 13:25)  Source: .Blood Blood-Peripheral  Preliminary Report (01-24-24 @ 22:00):    No growth at 4 days        RADIOLOGY & ADDITIONAL TESTS:    Personally reviewed.     Consultant(s) Notes Reviewed:  [x] YES  [ ] NO

## 2024-01-25 NOTE — PROGRESS NOTE ADULT - PROBLEM SELECTOR PLAN 3
Chronic HTN  - hypertensive urgency with SBP in 180s at times  - Pt was outpatient on HCTZ 25 mg qd and was discontinued 2 weeks ago by cardio and started Lasix.   - Persistent elevated BP since admission  - Continue Lasix 40mg PO BID  - Continue home coreg  - Continue losartan 75mg po daily  - Monitor BP Chronic HTN  - hypertensive urgency with SBP in 180s at times  - Pt was outpatient on HCTZ 25 mg qd and was discontinued 2 weeks ago by cardio and started Lasix.   - Persistent elevated BP since admission  - Continue Lasix 40mg PO daily  - Continue home coreg  - Continue losartan 75mg po daily  - Monitor BP

## 2024-01-25 NOTE — PROGRESS NOTE ADULT - PROVIDER SPECIALTY LIST ADULT
Cardiology
Hospitalist
Infectious Disease
Infectious Disease
Cardiology
Cardiology
Infectious Disease
Infectious Disease
Cardiology
Cardiology
Diabetes
Hospitalist

## 2024-01-25 NOTE — PROGRESS NOTE ADULT - PROBLEM SELECTOR PLAN 8
DVT ppx: on home xarelto

## 2024-01-25 NOTE — CASE MANAGEMENT PROGRESS NOTE - NSCMPROGRESSNOTE_GEN_ALL_CORE
Discussed patient on rounds this morning and with MD. Anticipate discharge home today. Referral to [facility] for SOC 1/26/24 has been accepted. Per [facility] the patient had a RW delivered on 9/7/23 through [employer]. CM met with the patient at the bedside to discuss transition planning for today and advised her that referral to [facility] was accepted for tomorrow. Patient confirmed that she received a Rollator through [employer] and also has a RW in the home. Patient is a CMS STAR patient/ program explained/contact card provided. AMY attempted to schedule follow up appointments. CM made contact with Dr. Yvan Carrasco's (Cardiologist) office who offered an appointment for 1/29/24 at 8:30am-patient stated it was too early. No other appointments available until the scheduled appointment for 2/21/24. Office stated if something sooner becomes available they will contact the patient. CM attempted to schedule an appointment for the patient's PCP Dr. Pisano- soonest appointment is 2/8/24 at 1:15pm-scheduled and patient made aware. AMY left a message for transition care team GERTRUDIS Venegas requesting a return call to assist with scheduling a home visit from TCM NP. Patient stated she will take a Taxi home today. Patient verbalized understanding of the transition plan and is in agreement. CM remains available.

## 2024-01-25 NOTE — DISCHARGE NOTE NURSING/CASE MANAGEMENT/SOCIAL WORK - NSDCPEFALRISK_GEN_ALL_CORE
For information on Fall & Injury Prevention, visit: [email or web address] OR  [email or web address] OR  https://www.cdc.gov/steadi/patient.html

## 2024-01-25 NOTE — PROGRESS NOTE ADULT - PROBLEM SELECTOR PLAN 7
continue home zolpidem

## 2024-01-25 NOTE — PROGRESS NOTE ADULT - PROBLEM SELECTOR PLAN 5
Chronic.  A1c 8.8%on admission.   - Pt at home on Humulin R U-500, with 80 U AM and 80U PM.   - will c/w pt's home insulin that she brought from home, but at lower dose for now  - Diabetic diet, Accucheck and hypoglycemia protocol.   - Diabetes NP consult (Weil), recs appreciated Chronic.  A1c 8.8% on admission.   - Pt at home on Humulin R U-500, with 80U AM and 80U PM.   - will c/w pt's home insulin that she brought from home, but at lower dose for now  - Diabetic diet, Accucheck and hypoglycemia protocol.   - Diabetes NP consult (Weil), recs appreciated

## 2024-01-25 NOTE — DISCHARGE NOTE NURSING/CASE MANAGEMENT/SOCIAL WORK - NSSCTYPOFSERV_GEN_ALL_CORE
Visiting Nurse- you should receive a call within 24-48 hours to schedule the first visit. If you have not received a call please contact the agency at the number listed above.

## 2024-01-25 NOTE — PROGRESS NOTE ADULT - SUBJECTIVE AND OBJECTIVE BOX
[facility] -- Josue Moore, Kenneth Gonzalez Savella, Goodger, Cohen: Office # [phone]    Follow Up:  LE edema     Subjective/Observations: Patient seen and examined. Patient awake, alert, resting in bed. No complaints of chest pain, dyspnea, palpitations or dizziness. No signs of orthopnea or PND. Tolerating room air. + LE edema, improving.     REVIEW OF SYSTEMS: All other review of systems are negative unless indicated above    PAST MEDICAL & SURGICAL HISTORY:  Hypothyroid      DM (diabetes mellitus)      HTN (hypertension)      Atrial fibrillation and flutter  s/p ablation      Bradycardia  s/p MDT PPM      H/O pulmonary hypertension      H/O hyperparathyroidism      H/O cardiomyopathy      Cardiac pacemaker      H/O cardiac radiofrequency ablation      H/O carpal tunnel repair      History of parathyroid surgery    MEDICATIONS  (STANDING):  carvedilol 12.5 milliGRAM(s) Oral every 12 hours  clotrimazole 1% Cream 1 Application(s) Topical two times a day  dextrose 5%. 1000 milliLiter(s) (50 mL/Hr) IV Continuous <Continuous>  dextrose 5%. 1000 milliLiter(s) (50 mL/Hr) IV Continuous <Continuous>  dextrose 5%. 1000 milliLiter(s) (100 mL/Hr) IV Continuous <Continuous>  dextrose 5%. 1000 milliLiter(s) (100 mL/Hr) IV Continuous <Continuous>  dextrose 5%. 1000 milliLiter(s) (50 mL/Hr) IV Continuous <Continuous>  dextrose 50% Injectable 25 Gram(s) IV Push once  dextrose 50% Injectable 12.5 Gram(s) IV Push once  dextrose 50% Injectable 25 Gram(s) IV Push once  dextrose 50% Injectable 25 Gram(s) IV Push once  dextrose 50% Injectable 12.5 Gram(s) IV Push once  digoxin     Tablet 250 MICROGram(s) Oral daily  furosemide    Tablet 40 milliGRAM(s) Oral two times a day  glucagon  Injectable 1 milliGRAM(s) IntraMuscular once  glucagon  Injectable 1 milliGRAM(s) IntraMuscular once  insulin lispro (ADMELOG) corrective regimen sliding scale   SubCutaneous three times a day before meals  insulin regular (concentrated) human recombinant U-500 45 Unit(s) SubCutaneous two times a day before meals  levothyroxine 125 MICROGram(s) Oral daily  losartan 75 milliGRAM(s) Oral daily  magnesium oxide 400 milliGRAM(s) Oral once  mupirocin 2% Ointment 1 Application(s) Topical two times a day  potassium chloride    Tablet ER 40 milliEquivalent(s) Oral once  rivaroxaban 20 milliGRAM(s) Oral with dinner    MEDICATIONS  (PRN):  acetaminophen     Tablet .. 650 milliGRAM(s) Oral every 6 hours PRN Mild Pain (1 - 3)  dextrose Oral Gel 15 Gram(s) Oral once PRN Blood Glucose LESS THAN 70 milliGRAM(s)/deciliter  zolpidem 5 milliGRAM(s) Oral at bedtime PRN Insomnia  zolpidem 5 milliGRAM(s) Oral at bedtime PRN Insomnia    Allergies  penicillin (Short breath; Rash)  statins (Vomiting)  vancomycin (Short breath; Rash)  erythromycin (Rash)    Vital Signs Last 24 Hrs  T(C): 37.1 (25 Jan 2024 04:15), Max: 37.1 (25 Jan 2024 04:15)  T(F): 98.7 (25 Jan 2024 04:15), Max: 98.7 (25 Jan 2024 04:15)  HR: 60 (25 Jan 2024 04:15) (60 - 63)  BP: 152/79 (25 Jan 2024 04:15) (148/69 - 152/79)  BP(mean): --  RR: 18 (25 Jan 2024 04:15) (15 - 18)  SpO2: 94% (25 Jan 2024 04:15) (94% - 94%)    Parameters below as of 25 Jan 2024 04:15  Patient On (Oxygen Delivery Method): room air      I&O's Summary    24 Jan 2024 07:01  -  25 Jan 2024 07:00  --------------------------------------------------------  IN: 0 mL / OUT: 650 mL / NET: -650 mL    TELE: Not on telemetry   PHYSICAL EXAM:  Constitutional: NAD, awake and alert, Obese   HEENT: Moist Mucous Membranes, Anicteric  Pulmonary: Non-labored, breath sounds are clear bilaterally, Diminished at bases No wheezing, rales or rhonchi  Cardiovascular: Regular, S1 and S2, + murmurs, No rubs, gallops or clicks  Gastrointestinal:  soft, nontender, nondistended   Lymph: +1-2 peripheral edema w/ redness No lymphadenopathy.   Skin: No visible rashes or ulcers.  Psych:  Mood & affect appropriate    LABS: All Labs Reviewed:                        12.8   5.33  )-----------( 148      ( 24 Jan 2024 07:05 )             40.8                         13.3   6.65  )-----------( 155      ( 23 Jan 2024 07:48 )             41.6                         13.2   5.74  )-----------( 162      ( 22 Jan 2024 08:25 )             42.0     24 Jan 2024 07:05    138    |  101    |  18     ----------------------------<  163    3.6     |  36     |  0.63   23 Jan 2024 07:48    138    |  102    |  18     ----------------------------<  168    4.0     |  34     |  0.80   22 Jan 2024 18:10    141    |  102    |  19     ----------------------------<  114    3.6     |  36     |  0.75     Ca    8.6        24 Jan 2024 07:05  Ca    9.0        23 Jan 2024 07:48  Ca    9.1        22 Jan 2024 18:10  Phos  4.3       24 Jan 2024 07:05  Phos  4.1       23 Jan 2024 07:48  Phos  3.9       22 Jan 2024 08:25  Mg     1.7       24 Jan 2024 07:05  Mg     1.8       23 Jan 2024 07:48  Mg     1.5       22 Jan 2024 08:25       Troponin I, High Sensitivity Result: 50.7 ng/L (01-20-24 @ 13:30)  Cholesterol: 124 mg/dL (01-21-24 @ 06:28)  HDL Cholesterol: 43 mg/dL (01-21-24 @ 06:28)  Triglycerides, Serum: 90 mg/dL (01-21-24 @ 06:28)    12 Lead ECG:   Ventricular Rate 61 BPM    QRS Duration 168 ms    Q-T Interval 456 ms    QTC Calculation(Bazett) 459 ms    R Axis -54 degrees    T Axis 111 degrees    Diagnosis Line A-paced  Wide QRS rhythm  Right bundle branch block  Left anterior fascicular block  *** Bifascicular block ***  Left ventricular hypertrophy with repolarization abnormality ( R in aVL )  Abnormal ECG  No previous ECGs available  Confirmed by Shahrzad Drake ([id number]) on 1/20/2024 2:51:23 PM (01-20-24 @ 13:08)      TRANSTHORACIC ECHOCARDIOGRAM REPORT  ________________________________________________________________________________                                      _______       Pt. Name:       ROSITA GUTIERREZ Study Date:    1/22/2024  MRN:            [id number]          YOB: 1950  Accession #:    [id number]         Age:           73 years  Account#:       [id number]        Gender:        F  Heart Rate:                       Height:        ( )  Rhythm:                           Weight:   220.46 lb (100.00 kg)  Blood Pressure: 160/61 mmHg       BSA/BMI:       2.15 m² /  ________________________________________________________________________________________  Referring Physician:    [id number] Lexx Cuenca  Interpreting Physician: Serafin Cooper  Primary Sonographer:    Bernabe Heredia    CPT:               ECHO TTE WO CON COMP W DOPP - 81695.m  Indication(s):     Dyspnea, unspecified - R06.00  Procedure:         Transthoracic echocardiogram with 2-D, M-mode and complete          spectral and color flow Doppler.  Ordering Location: [facility]  Admission Status:  Inpatient  Study Information: Image quality for this study is technically difficult.    _______________________________________________________________________________________     CONCLUSIONS:      1. Technically difficult image quality.   2. Left ventricular systolic function is mildly decreased with an ejection fraction visually estimated at 45 to 50 %. Global left ventricular hypokinesis.   3. Based on visualassessment, the right ventricle appears mildly enlarged. borderline reduced systolic function.   4. Device lead is visualized in the right heart.   5. The left atrium is moderately dilated.   6. The right atrium is dilated in size.   7. Symmetric mitral valve leaflet tethering.   8. Mild mitral regurgitation.   9. Trileaflet aortic valve with reduced systolic excursion. There is calcification of the aortic valve leaflets. moderate aortic stenosis.  10. The DEVYN is estimated at 1.1sqcm.  11. Moderate to severe tricuspid regurgitation.  12. Estimated pulmonary artery systolic pressure is 78 mmHg, consistent with severe pulmonary hypertension.  13. The inferior vena cava is dilated measuring 2.67 cm in diameter, (dilated >2.1cm) with abnormal inspiratory collapse (abnormal <50%) consistent with elevated right atrial pressure (~15, range 10-20mmHg).    ________________________________________________________________________________________  FINDINGS:     Left Ventricle:  Left ventricular systolic function is mildly decreased with an ejection fraction visually estimated at 45 to 50%. There is global left ventricular hypokinesis.     Right Ventricle:  Based on visual assessment, the right ventricle appears mildly enlarged. Borderline reducedsystolic function. Tricuspid annular plane systolic excursion (TAPSE) is 2.3 cm (normal >=1.7 cm). A device lead is visualized in the right heart.     Left Atrium:  The left atrium is moderately dilated with an indexed volume of 45.99 ml/m².     Right Atrium:  The right atrium is dilated in size.     Aortic Valve:  The aortic valve appears trileaflet with reduced systolic excursion. There is calcification of the aortic valve leaflets. There is moderate aortic stenosis. The peak transaortic velocity is 2.61 m/s, peak transaortic gradient is 27.2 mmHg and mean transaortic gradient is 13.0 mmHg with an LVOT/aortic valve VTI ratio of 0.31. The aortic valve area is estimated at 1.09 cm² by the continuity equation. The DEVYN is estimated at 1.1sqcm. There is trace aortic regurgitation.     Mitral Valve:  There is calcification of the mitral valve annulus. Mitral valve leaflets are diffusely calcified. There is symmetric leaflet tethering. There is mild mitral regurgitation.     Tricuspid Valve:  Structurally normal tricuspid valve with normal leaflet excursion. There is moderate to severe tricuspid regurgitation. Estimated pulmonary artery systolic pressure is 78 mmHg, consistent with severe pulmonary hypertension.     Pulmonic Valve:  The pulmonic valve was not well visualized. There is mild pulmonic regurgitation.     Aorta:  The aortic root at the sinuses of Valsalva is normal in size, measuring 3.00 cm (indexed 1.39 cm/m²). The ascending aorta diameter is normal in size, measuring 2.60 cm (indexed 1.21 cm/m²).     Systemic Veins:  The inferior vena cava is dilated measuring 2.67 cm in diameter, (dilated >2.1cm) with abnormal inspiratory collapse (abnormal <50%) consistent with elevated right atrial pressure (~15, range 10-20mmHg).  ____________________________________________________________________  QUANTITATIVE DATA:  Left Ventricle Measurements: (Indexed to BSA)     IVSd (2D):   1.4 cm  LVPWd (2D):  1.3 cm  LVIDd (2D):  5.4 cm  LVIDs (2D):  4.5 cm  LV Mass:     314 g  145.8g/m²  Visualized LV EF%: 45 to 50%     MV E Vmax:    1.71 m/s  MV A Vmax:    0.75 m/s  MV E/A:       2.27  e' lateral:   6.42 cm/s  e' medial:    5.87 cm/s  E/e' lateral: 26.64  E/e' medial:  29.13  E/e' Average: 27.83  MV DT:        161 msec    Aorta Measurements: (normal range) (Indexed to BSA)     Sinuses of Valsalva: 3.00 cm (2.7 - 3.3 cm)  Ao Asc prox:         2.60 cm       Left Atrium Measurements: (Indexed to BSA)  LA Diam 2D: 5.00 cm    Right Ventricle Measurements:     TAPSE:            2.3 cm  RV Base (RVID1):  4.3 cm  RV Mid (RVID2):   3.2 cm  RV Major (RVID3): 7.5 cm       LVOT / RVOT/ Qp/Qs Data: (Indexed to BSA)  LVOT Diameter: 2.10 cm  LVOT Vmax:     1.02 m/s  LVOT VTI:      17.80 cm  LVOT SV:       61.7 ml  28.61 ml/m²    Aortic Valve Measurements:  AV Vmax:                2.6 m/s  AV Peak Gradient:       27.2 mmHg  AV Mean Gradient:       13.0 mmHg  AV VTI:                 56.6 cm  AV VTI Ratio:           0.31  AoV EOA, Contin:        1.09 cm²  AoV EOA, Contin i:      0.51 cm²/m²  AoV Dimensionless Index 0.31    Mitral Valve Measurements:     MV E Vmax: 1.7 m/s         MR Vmax:          5.05 m/s  MV A Vmax: 0.8 m/s         MR Peak Gradient: 102.0 mmHg  MV E/A:    2.3       Tricuspid Valve Measurements:     TR Vmax:      4.0 m/s  TR Peak Gradient: 62.7 mmHg  RA Pressure:      15 mmHg  PASP:             78 mmHg    ________________________________________________________________________________________  Electronically signed on 1/22/2024 at 5:39:34 PM by Serafin Cooper  *** Final **

## 2024-01-25 NOTE — PROGRESS NOTE ADULT - PROBLEM SELECTOR PROBLEM 2
Multiple open wounds of lower leg

## 2024-01-26 PROCEDURE — 36415 COLL VENOUS BLD VENIPUNCTURE: CPT

## 2024-01-26 PROCEDURE — 87040 BLOOD CULTURE FOR BACTERIA: CPT

## 2024-01-26 PROCEDURE — 83735 ASSAY OF MAGNESIUM: CPT

## 2024-01-26 PROCEDURE — 83605 ASSAY OF LACTIC ACID: CPT

## 2024-01-26 PROCEDURE — 97165 OT EVAL LOW COMPLEX 30 MIN: CPT

## 2024-01-26 PROCEDURE — 93970 EXTREMITY STUDY: CPT

## 2024-01-26 PROCEDURE — 93306 TTE W/DOPPLER COMPLETE: CPT

## 2024-01-26 PROCEDURE — 85027 COMPLETE CBC AUTOMATED: CPT

## 2024-01-26 PROCEDURE — 83880 ASSAY OF NATRIURETIC PEPTIDE: CPT

## 2024-01-26 PROCEDURE — 97535 SELF CARE MNGMENT TRAINING: CPT

## 2024-01-26 PROCEDURE — 81001 URINALYSIS AUTO W/SCOPE: CPT

## 2024-01-26 PROCEDURE — 84484 ASSAY OF TROPONIN QUANT: CPT

## 2024-01-26 PROCEDURE — 82962 GLUCOSE BLOOD TEST: CPT

## 2024-01-26 PROCEDURE — 97110 THERAPEUTIC EXERCISES: CPT

## 2024-01-26 PROCEDURE — 80162 ASSAY OF DIGOXIN TOTAL: CPT

## 2024-01-26 PROCEDURE — 97530 THERAPEUTIC ACTIVITIES: CPT

## 2024-01-26 PROCEDURE — 71045 X-RAY EXAM CHEST 1 VIEW: CPT

## 2024-01-26 PROCEDURE — 85730 THROMBOPLASTIN TIME PARTIAL: CPT

## 2024-01-26 PROCEDURE — 99285 EMERGENCY DEPT VISIT HI MDM: CPT | Mod: 25

## 2024-01-26 PROCEDURE — 97162 PT EVAL MOD COMPLEX 30 MIN: CPT

## 2024-01-26 PROCEDURE — 80048 BASIC METABOLIC PNL TOTAL CA: CPT

## 2024-01-26 PROCEDURE — 85025 COMPLETE CBC W/AUTO DIFF WBC: CPT

## 2024-01-26 PROCEDURE — 80061 LIPID PANEL: CPT

## 2024-01-26 PROCEDURE — 84100 ASSAY OF PHOSPHORUS: CPT

## 2024-01-26 PROCEDURE — 86803 HEPATITIS C AB TEST: CPT

## 2024-01-26 PROCEDURE — 97116 GAIT TRAINING THERAPY: CPT

## 2024-01-26 PROCEDURE — 96374 THER/PROPH/DIAG INJ IV PUSH: CPT

## 2024-01-26 PROCEDURE — 0225U NFCT DS DNA&RNA 21 SARSCOV2: CPT

## 2024-01-26 PROCEDURE — 83036 HEMOGLOBIN GLYCOSYLATED A1C: CPT

## 2024-01-26 PROCEDURE — 93005 ELECTROCARDIOGRAM TRACING: CPT

## 2024-01-26 PROCEDURE — 80053 COMPREHEN METABOLIC PANEL: CPT

## 2024-01-26 PROCEDURE — 85652 RBC SED RATE AUTOMATED: CPT

## 2024-01-26 PROCEDURE — 85610 PROTHROMBIN TIME: CPT

## 2024-01-26 RX ORDER — LOSARTAN POTASSIUM 100 MG/1
1.5 TABLET, FILM COATED ORAL
Qty: 45 | Refills: 0
Start: 2024-01-26 | End: 2024-02-24

## 2024-01-28 ENCOUNTER — TRANSCRIPTION ENCOUNTER (OUTPATIENT)
Age: 74
End: 2024-01-28

## 2024-01-29 ENCOUNTER — APPOINTMENT (OUTPATIENT)
Dept: CARE COORDINATION | Facility: HOME HEALTH | Age: 74
End: 2024-01-29
Payer: MEDICARE

## 2024-01-29 VITALS
SYSTOLIC BLOOD PRESSURE: 160 MMHG | BODY MASS INDEX: 36.61 KG/M2 | OXYGEN SATURATION: 95 % | RESPIRATION RATE: 17 BRPM | HEART RATE: 62 BPM | DIASTOLIC BLOOD PRESSURE: 80 MMHG | WEIGHT: 220 LBS

## 2024-01-29 DIAGNOSIS — I50.9 HEART FAILURE, UNSPECIFIED: ICD-10-CM

## 2024-01-29 PROCEDURE — 99495 TRANSJ CARE MGMT MOD F2F 14D: CPT

## 2024-01-29 RX ORDER — PREGABALIN 75 MG/1
75 CAPSULE ORAL
Qty: 60 | Refills: 0 | Status: DISCONTINUED | COMMUNITY
Start: 2023-07-26 | End: 2024-01-29

## 2024-01-29 NOTE — REVIEW OF SYSTEMS
[Lower Ext Edema] : lower extremity edema [Muscle Weakness] : muscle weakness [Skin Rash] : skin rash [Depression] : depression [Negative] : Heme/Lymph

## 2024-01-30 ENCOUNTER — TRANSCRIPTION ENCOUNTER (OUTPATIENT)
Age: 74
End: 2024-01-30

## 2024-01-30 NOTE — HISTORY OF PRESENT ILLNESS
[Post-hospitalization from ___ Hospital] : Post-hospitalization from [unfilled] Hospital [Admitted on: ___] : The patient was admitted on [unfilled] [Discharged on ___] : discharged on [unfilled] [Discharge Summary] : discharge summary [Discharge Med List] : discharge medication list [Med Reconciliation] : medication reconciliation has been completed [Patient Contacted By: ____] : and contacted by [unfilled] [FreeTextEntry3] : Patient was contacted by TCM RN on 1/26/24  for 24/48 hour call and documentation is present in the Clinical Viewer  [FreeTextEntry2] : 72 y/o F with PMHx of atrial fibrillation on xarelto, atrial flutter s/p ablation, bradycardia and syncope s/p ppm implant, HTN, DM, cardiomyopathy with moderate LV dysfunction, hypothyroid, hyperparathyroidism, depression, pulmonary HTN presented to ED on 1/20 with progressively worsening SOB and worsening weeping b/l LE wounds. pt admitted for acute congestive heart failure and r/o LE wound infection. ID, cardiology and endo were consulted. Pt was started on Lasix 40mg bid, TTE showed EF 45 to 50 %, global left ventricular hypokinesis, mild MR< mod AS, mod to sev TR, PASP 78, severe pulmonary hypertension. WBC remained normal with no fevers, and no concern for LE cellulitis/infection and pt was monitored off antibiotics. Blood culture from admission were negative. Pt presented sustained elevated blood pressure and losartan was started and titrated  with better control. Pt presented as well episodes of mild hypoglycemia and Humulin was adjusted.    Since dc, pt denies cp/sob/pnd/orthopnea/ferrari.  She states her breathing has been ok but she still feels physically deconditioned.  Ambulating without assistance or DME at time of visit.  Pt seems depressed from loss of partner, family members, and home she shared with partner, behavioral counseling provided, TCM RN made BH referral for further services.  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  She is asymptomatic for elevated BP.  Most needs are psychosocial.  She drives, but is worried to drive, she does not want to go get groceries or order groceries so has been eating out for meals.  TCM RN arranging meals on wheels for pt.  She has chronic lower extremity edema with BLLE wounds, all in various forms of healing.  Homecare has been assessing, has cream to treat.  Has PCP f/u with Dr. Pisano 2/8, cardio Dr. Sow 2/21.  HC had SOC, pending PT.  TCM numbers provided for any questions/concerns.

## 2024-01-30 NOTE — PHYSICAL EXAM
[No Acute Distress] : no acute distress [Normal Sclera/Conjunctiva] : normal sclera/conjunctiva [Normal Outer Ear/Nose] : the outer ears and nose were normal in appearance [No JVD] : no jugular venous distention [No Respiratory Distress] : no respiratory distress  [Normal Rate] : normal rate  [Coordination Grossly Intact] : coordination grossly intact [No Focal Deficits] : no focal deficits [Normal Affect] : the affect was normal [Normal Insight/Judgement] : insight and judgment were intact [de-identified] : LLL diminished  [de-identified] : +3 BLLE edema  [de-identified] : Left and right lower extremity wounds in various stages of healing

## 2024-02-08 ENCOUNTER — APPOINTMENT (OUTPATIENT)
Dept: FAMILY MEDICINE | Facility: CLINIC | Age: 74
End: 2024-02-08
Payer: MEDICARE

## 2024-02-08 VITALS
WEIGHT: 211 LBS | OXYGEN SATURATION: 96 % | SYSTOLIC BLOOD PRESSURE: 132 MMHG | DIASTOLIC BLOOD PRESSURE: 78 MMHG | BODY MASS INDEX: 34.73 KG/M2 | TEMPERATURE: 98.3 F | HEART RATE: 64 BPM | HEIGHT: 65.5 IN

## 2024-02-08 VITALS — DIASTOLIC BLOOD PRESSURE: 80 MMHG | SYSTOLIC BLOOD PRESSURE: 124 MMHG

## 2024-02-08 PROCEDURE — 99214 OFFICE O/P EST MOD 30 MIN: CPT

## 2024-02-08 RX ORDER — LOSARTAN POTASSIUM 50 MG/1
50 TABLET, FILM COATED ORAL
Qty: 90 | Refills: 0 | Status: DISCONTINUED | COMMUNITY
Start: 2024-01-29 | End: 2024-02-08

## 2024-02-08 NOTE — PLAN
[FreeTextEntry1] : # HFrEF - recent hospitalization for CHF exacerbation - TTE showed EF 45 to 50 %, global left ventricular hypokinesis, mild MR, mod AS, mod to severe TR, PASP 78, severe pulmonary hypertension - today pt still with significant LE edema - continue Lasix 60 mg PO daily per Cardio, then transition back to Lasix 40 mg daily - FU BMP today to evaluate renal function  - FU with Cardiology for further management  # HTN - BP wnl today - continue Losartan 75 mg daily, carvedilol 12.5 mg BID, and Lasix 40 mg daily  Diabetes - patient has follow up scheduled with endocrine this month

## 2024-02-08 NOTE — HISTORY OF PRESENT ILLNESS
[FreeTextEntry8] : 72 y/o F with PMHx of atrial fibrillation on xarelto, atrial flutter s/p ablation, bradycardia and syncope s/p PPM, HTN, DM2, HFrEF w/ moderate LV dysfunction, hypothyroid, hyperparathyroidism, depression, pulmonary HTN, presents after hospital visit.   She was admitted to [facility] from 1/20/24-1/25/24 for acute congestive heart failure exacerbation and worsening weeping edema, r/o LE wound infection.  She was treated with Lasix 40mg IV bid.  TTE showed EF 45 to 50 %, global left ventricular hypokinesis, mild MR, mod AS, mod to severe TR, PASP 78, severe pulmonary hypertension.  She had no leukocytosis and there was no concern for LE cellulitis/infection and pt was monitored off antibiotics. Blood culture from admission were negative.  Wounds are suspected to be from chronic venous insufficiency. Pt had HTN and her losartan was titrated with better control.  Pt presented clinical improvement, responded appropriately to IV and then PO diuresis, cleared for home discharge w/ Lasix 40 daily and losartan 75 daily. Initial PT evaluation recommended discharge to [facility]. Pt declined and continued to work with PT with better mobility once the volume overload improved.  Pt will continue home PT.  Medication changes: Lasix 40 mg PO daily, Losartan 50 mg daily 1.5 tab daily for total 75 mg daily, Potassium chloride  She FU with Cardio with Dr. Ellis on 2/5, who recommended increase Lasix to 80 mg daily for a few days, and then go back to 40 mg daily. She took 80 mg for 1 day, and felt that it made her tired due to having to go to the bathroom very frequently, so now she is taking 60 mg daily for a few days. There is potential plan for valve procedure per patient.  Today the pt states that she feels tired from having to get up and go to the bathroom frequently. The leg sores are improving, the leg selling is improving.  Denies SOB, CP, leg pain. She states that she is no longer having blood in stool. Wheelchair/Stroller

## 2024-02-08 NOTE — PHYSICAL EXAM
[No Acute Distress] : no acute distress [Well-Appearing] : well-appearing [Normal Rate] : normal rate  [Regular Rhythm] : with a regular rhythm [Normal S1, S2] : normal S1 and S2 [Normal] : affect was normal and insight and judgment were intact [de-identified] : + systolic murmur [de-identified] : 2+ pitting edema bilat LE [de-identified] : + healing wounds bilat LE without erythema and drainage

## 2024-02-14 ENCOUNTER — TRANSCRIPTION ENCOUNTER (OUTPATIENT)
Age: 74
End: 2024-02-14

## 2024-02-14 ENCOUNTER — NON-APPOINTMENT (OUTPATIENT)
Age: 74
End: 2024-02-14

## 2024-02-14 LAB
ANION GAP SERPL CALC-SCNC: 13 MMOL/L
BUN SERPL-MCNC: 20 MG/DL
CALCIUM SERPL-MCNC: 9.2 MG/DL
CHLORIDE SERPL-SCNC: 96 MMOL/L
CO2 SERPL-SCNC: 29 MMOL/L
CREAT SERPL-MCNC: 0.72 MG/DL
EGFR: 87 ML/MIN/1.73M2
GLUCOSE SERPL-MCNC: 316 MG/DL
POTASSIUM SERPL-SCNC: 4.2 MMOL/L
SODIUM SERPL-SCNC: 138 MMOL/L

## 2024-02-28 ENCOUNTER — APPOINTMENT (OUTPATIENT)
Dept: WOUND CARE | Facility: HOSPITAL | Age: 74
End: 2024-02-28
Payer: MEDICARE

## 2024-02-28 ENCOUNTER — OUTPATIENT (OUTPATIENT)
Dept: OUTPATIENT SERVICES | Facility: HOSPITAL | Age: 74
LOS: 1 days | Discharge: HOME | End: 2024-02-28
Payer: MEDICARE

## 2024-02-28 VITALS
HEART RATE: 63 BPM | WEIGHT: 211 LBS | OXYGEN SATURATION: 94 % | DIASTOLIC BLOOD PRESSURE: 78 MMHG | RESPIRATION RATE: 18 BRPM | SYSTOLIC BLOOD PRESSURE: 187 MMHG | HEIGHT: 65 IN | TEMPERATURE: 98.8 F | BODY MASS INDEX: 35.16 KG/M2

## 2024-02-28 DIAGNOSIS — Z86.79 PERSONAL HISTORY OF OTHER DISEASES OF THE CIRCULATORY SYSTEM: ICD-10-CM

## 2024-02-28 DIAGNOSIS — Z95.0 PRESENCE OF CARDIAC PACEMAKER: ICD-10-CM

## 2024-02-28 DIAGNOSIS — Z95.0 PRESENCE OF CARDIAC PACEMAKER: Chronic | ICD-10-CM

## 2024-02-28 DIAGNOSIS — E03.9 HYPOTHYROIDISM, UNSPECIFIED: ICD-10-CM

## 2024-02-28 DIAGNOSIS — I87.2 VENOUS INSUFFICIENCY (CHRONIC) (PERIPHERAL): ICD-10-CM

## 2024-02-28 DIAGNOSIS — I27.0 PRIMARY PULMONARY HYPERTENSION: ICD-10-CM

## 2024-02-28 DIAGNOSIS — Z98.890 OTHER SPECIFIED POSTPROCEDURAL STATES: Chronic | ICD-10-CM

## 2024-02-28 DIAGNOSIS — Z86.69 PERSONAL HISTORY OF OTHER DISEASES OF THE NERVOUS SYSTEM AND SENSE ORGANS: ICD-10-CM

## 2024-02-28 DIAGNOSIS — Z79.899 OTHER LONG TERM (CURRENT) DRUG THERAPY: ICD-10-CM

## 2024-02-28 DIAGNOSIS — Z98.890 OTHER SPECIFIED POSTPROCEDURAL STATES: ICD-10-CM

## 2024-02-28 DIAGNOSIS — Z86.59 PERSONAL HISTORY OF OTHER MENTAL AND BEHAVIORAL DISORDERS: ICD-10-CM

## 2024-02-28 DIAGNOSIS — Z86.39 PERSONAL HISTORY OF OTHER ENDOCRINE, NUTRITIONAL AND METABOLIC DISEASE: ICD-10-CM

## 2024-02-28 DIAGNOSIS — L97.811 NON-PRESSURE CHRONIC ULCER OF OTHER PART OF RIGHT LOWER LEG LIMITED TO BREAKDOWN OF SKIN: ICD-10-CM

## 2024-02-28 DIAGNOSIS — Z79.4 LONG TERM (CURRENT) USE OF INSULIN: ICD-10-CM

## 2024-02-28 DIAGNOSIS — Z79.01 LONG TERM (CURRENT) USE OF ANTICOAGULANTS: ICD-10-CM

## 2024-02-28 DIAGNOSIS — Z87.898 PERSONAL HISTORY OF OTHER SPECIFIED CONDITIONS: ICD-10-CM

## 2024-02-28 DIAGNOSIS — I50.20 UNSPECIFIED SYSTOLIC (CONGESTIVE) HEART FAILURE: ICD-10-CM

## 2024-02-28 DIAGNOSIS — I48.91 UNSPECIFIED ATRIAL FIBRILLATION: ICD-10-CM

## 2024-02-28 DIAGNOSIS — I45.10 UNSPECIFIED RIGHT BUNDLE-BRANCH BLOCK: ICD-10-CM

## 2024-02-28 DIAGNOSIS — I11.0 HYPERTENSIVE HEART DISEASE WITH HEART FAILURE: ICD-10-CM

## 2024-02-28 DIAGNOSIS — L97.801 NON-PRESSURE CHRONIC ULCER OF OTHER PART OF UNSPECIFIED LOWER LEG LIMITED TO BREAKDOWN OF SKIN: ICD-10-CM

## 2024-02-28 PROCEDURE — 99203 OFFICE O/P NEW LOW 30 MIN: CPT

## 2024-02-28 PROCEDURE — G0463: CPT

## 2024-02-28 RX ORDER — SYRINGE-NEEDLE,INSULIN,0.5 ML 31 GX5/16"
31G X 5/16" SYRINGE, EMPTY DISPOSABLE MISCELLANEOUS
Qty: 99 | Refills: 0 | Status: ACTIVE | COMMUNITY
Start: 2022-06-17

## 2024-02-28 RX ORDER — BLOOD-GLUCOSE METER
W/DEVICE EACH MISCELLANEOUS
Qty: 1 | Refills: 0 | Status: ACTIVE | COMMUNITY
Start: 2023-07-28

## 2024-02-28 RX ORDER — PEN NEEDLE, DIABETIC 29 G X1/2"
31G X 5 MM NEEDLE, DISPOSABLE MISCELLANEOUS
Qty: 100 | Refills: 0 | Status: ACTIVE | COMMUNITY
Start: 2023-10-01

## 2024-02-28 RX ORDER — POTASSIUM CHLORIDE 750 MG/1
10 TABLET, EXTENDED RELEASE ORAL
Refills: 0 | Status: ACTIVE | COMMUNITY

## 2024-02-28 RX ORDER — ALPRAZOLAM 0.25 MG/1
0.25 TABLET ORAL
Qty: 30 | Refills: 0 | Status: ACTIVE | COMMUNITY
Start: 2023-11-07

## 2024-02-28 RX ORDER — FUROSEMIDE 40 MG/1
40 TABLET ORAL
Qty: 10 | Refills: 0 | Status: ACTIVE | COMMUNITY
Start: 2024-01-02

## 2024-02-28 RX ORDER — INSULIN HUMAN 500 [IU]/ML
500 INJECTION, SOLUTION SUBCUTANEOUS
Qty: 3 | Refills: 0 | Status: ACTIVE | COMMUNITY
Start: 2023-07-26

## 2024-02-28 RX ORDER — ETOH/EUC OIL/MENTH/PEP/WINTERG
1 SPRAY, NON-AEROSOL (ML) MUCOUS MEMBRANE 3 TIMES DAILY
Refills: 0 | Status: COMPLETED | COMMUNITY
Start: 2024-01-29 | End: 2024-02-28

## 2024-02-28 RX ORDER — ZOLPIDEM TARTRATE 12.5 MG/1
12.5 TABLET, EXTENDED RELEASE ORAL
Refills: 0 | Status: ACTIVE | COMMUNITY
Start: 2022-06-17

## 2024-02-28 NOTE — REVIEW OF SYSTEMS
[Negative] : Heme/Lymph [FreeTextEntry2] : over weight [FreeTextEntry5] : A Fib, hypertension,RBBB,CHF,Cardiomyopathy, Aortic valve insufficiency [FreeTextEntry6] : pulmonary hypertension [de-identified] : DM 2 insulin dependent, hypothyroid [FreeTextEntry1] : venous insufficiency

## 2024-02-28 NOTE — HISTORY OF PRESENT ILLNESS
[FreeTextEntry1] : 74 y/o F with PMHx of atrial fibrillation on xarelto, atrial flutter s/p ablation, bradycardia and syncope s/p PPM, HTN, DM2, HFrEF w/ moderate LV dysfunction, hypothyroid, hyperparathyroidism, depression, pulmonary HTN, preents today with lower leg swelling and edema  She was admitted to [facility] from 1/20/24-1/25/24 for acute congestive heart failure exacerbation and worsening weeping edema, r/o LE wound infection. She was treated with Lasix 40mg IV bid. TTE showed EF 45 to 50 %, global left  Wounds are suspected to be from chronic venous insufficiency. Pt had HTN and her losartan was titrated with better control. Pt presented clinical improvement, responded appropriately to IV and then PO diuresis, cleared for home discharge w/ Lasix 40 daily and losartan 75 daily. Initial PT evaluation recommended discharge to [facility]. Pt declined and continued to work with PT with better mobility once the volume overload improved. Pt will continue home PT. Medication changes: Lasix 40 mg PO daily, Losartan 50 mg daily 1.5 tab daily for total 75 mg daily, Potassium chloride  She FU with Cardio with Dr. Ellis on 2/5, who recommended increase Lasix to 80 mg daily for a few days, and then go back to 40 mg daily. She took 80 mg for 1 day, and felt that it made her tired due to having to go to the bathroom very frequently, so now she is taking 60 mg daily for a few days.

## 2024-02-28 NOTE — PLAN
[FreeTextEntry1] : bilateral stasis ulcers lower legs and bilateral lower leg edema bilateral adaptic,AgAlginate,DD,QOD, Loose ace wraps arterial studies. Patient stated she had venous study when she was hospitalized vascular consult elevate lower legs patient to contact her Cardiologist about increased diuretic return 1 week 35 minutes spent in review,evaluation and treatment planning

## 2024-02-28 NOTE — VITALS
[] : Yes [de-identified] : 0/10 [FreeTextEntry3] : Left Lower Anterior Leg & Both Feet (from neuropathy) [FreeTextEntry1] : Tylenol [FreeTextEntry4] : Pain Management [FreeTextEntry2] : "Nothing - it's just there" [FreeTextEntry5] : Initial Visit - Meds Reconcilled

## 2024-02-28 NOTE — PHYSICAL EXAM
[4 x 4] : 4 x 4  [0] : left 0 [Ankle Swelling (On Exam)] : present [Ankle Swelling Bilaterally] : bilaterally  [Ankle Swelling On The Left] : moderate [] : bilaterally [Alert] : alert [Oriented to Person] : oriented to person [Oriented to Place] : oriented to place [Oriented to Time] : oriented to time [Anxious] : anxious [Varicose Veins Of Lower Extremities] : not present [de-identified] : WD overweight 74 Y/O white female in NAD [de-identified] : bilateral lower leg superficial ulcers left greater then right with 3+ pitting edema, No SOI [FreeTextEntry1] : Left Lower Leg - Superior [FreeTextEntry3] : 1.2 [FreeTextEntry2] : 1.4 [FreeTextEntry4] : 0.1 [de-identified] : Serous [de-identified] : Patient expressed comfort post ACE application. Circulation/neuromuscular functions WNL post application. [de-identified] : Adaptic Touch & Silver Alginate [FreeTextEntry7] : Left Lower Leg - Inferior [de-identified] : Mechanically cleansed with sterile gauze and 0.9% normal saline. Dry Dressing Kerlix  CIRCULATION Pulses nonpalpable via palpation. Doppler used:  Dorsalis Pedis: - R Palpable, Regular, 2+ - L Palpable, Regular, 2+ Posterior Tibialis: - R Palpable, Regular, 2+ - L Palpable, Regular, 2+  Extremity Color: Normal Extremity Temperature: Warm Capillary Refill: < 3 seconds bilaterally  [FreeTextEntry8] : 4.0 [FreeTextEntry9] : 2.4 [de-identified] : 0.1 [de-identified] : Serous [de-identified] : Patient expressed comfort post ACE application. Circulation/neuromuscular functions WNL post application. [de-identified] : Adaptic Touch & Silver Alginate [de-identified] : Mechanically cleansed with sterile gauze and 0.9% normal saline. Dry Dressing Kerlix  CIRCULATION Pulses nonpalpable via palpation. Doppler used:  Dorsalis Pedis: - R Palpable, Regular, 2+ - L Palpable, Regular, 2+ Posterior Tibialis: - R Palpable, Regular, 2+ - L Palpable, Regular, 2+  Extremity Color: Normal Extremity Temperature: Warm Capillary Refill: < 3 seconds bilaterally  [de-identified] : Right Lower Leg - ACE Only [de-identified] : Patient expressed comfort post ACE application. Circulation/neuromuscular functions WNL post application. [de-identified] : No Product [de-identified] : Mechanically cleansed with sterile gauze and 0.9% normal saline. Dry Dressing Kerlix  CIRCULATION Pulses nonpalpable via palpation. Doppler used:  Dorsalis Pedis: - R Palpable, Regular, 2+ - L Palpable, Regular, 2+ Posterior Tibialis: - R Palpable, Regular, 2+ - L Palpable, Regular, 2+  Extremity Color: Normal Extremity Temperature: Warm Capillary Refill: < 3 seconds bilaterally  [TWNoteComboBox4] : Moderate [TWNoteComboBox5] : No [de-identified] : No [de-identified] : Erythema [de-identified] : None [de-identified] : None [de-identified] : 100% [de-identified] : No [de-identified] : Every other day [de-identified] : Ace wraps [de-identified] : Primary Dressing [de-identified] : No [de-identified] : Moderate [de-identified] : No [de-identified] : None [de-identified] : Erythema [de-identified] : None [de-identified] : 100% [de-identified] : Ace wraps [de-identified] : No [de-identified] : Every other day [de-identified] : Ace wraps [de-identified] : Primary Dressing

## 2024-02-28 NOTE — ASSESSMENT
[Verbal] : Verbal [Patient] : Patient [Good - alert, interested, motivated] : Good - alert, interested, motivated [Dressing changes] : dressing changes [Verbalizes knowledge/Understanding] : Verbalizes knowledge/understanding [Skin Care] : skin care [Nutrition] : nutrition [Signs and symptoms of infection] : sign and symptoms of infection [Patient responsibility to plan of care] : patient responsibility to plan of care [How and When to Call] : how and when to call [] : Yes [Stable] : stable [Home] : Home [Cane] : Cane [Faxed - Long Term Care/Home Health Agency] : Long Term Care/Home Health Agency: Faxed [Demo] : Demo [Written] : Written [FreeTextEntry2] : Compression Therapy Elevation & Low Sodium Compliance Glycemic Control Infection Prevention Maintain Optimal Skin Integrity to High Pressure Areas Nutrition & Wound Healing Offload & Pressure Relief Promote & Restore Optimal Skin Integrity Protect & Guard Wound Site Weight reduction strategies Wound Care (Dressing Changes) Vascular consult / Vascular studies. [FreeTextEntry4] : Pt advised she has compression stockings at home but doesn't wear them because they're too tight. MD assessed wound and advised arterial vascular studies required - auth submitted. Pt already diagnosed w/ chronic venous insuffiency. MD advised pt to s/w cardiologist to perhaps increase the dosage of Lasix. \ [facility] instructions signed and faxed w/ supply order to [facility]. Some supplies given to patient. ***NOTE TO NEXT NURSE - DON'T PUT ACE WRAPS ON WITH TOO MUCH COMPRESSION PER MD*** F/U 1 week [FreeTextEntry3] : Initial Visit [FreeTextEntry1] : [facility]

## 2024-03-07 ENCOUNTER — NON-APPOINTMENT (OUTPATIENT)
Age: 74
End: 2024-03-07

## 2024-03-07 ENCOUNTER — RX RENEWAL (OUTPATIENT)
Age: 74
End: 2024-03-07

## 2024-03-08 ENCOUNTER — OUTPATIENT (OUTPATIENT)
Dept: OUTPATIENT SERVICES | Facility: HOSPITAL | Age: 74
LOS: 1 days | Discharge: HOME | End: 2024-03-08
Payer: MEDICARE

## 2024-03-08 ENCOUNTER — APPOINTMENT (OUTPATIENT)
Dept: WOUND CARE | Facility: HOSPITAL | Age: 74
End: 2024-03-08
Payer: MEDICARE

## 2024-03-08 ENCOUNTER — NON-APPOINTMENT (OUTPATIENT)
Age: 74
End: 2024-03-08

## 2024-03-08 VITALS
SYSTOLIC BLOOD PRESSURE: 192 MMHG | RESPIRATION RATE: 18 BRPM | BODY MASS INDEX: 35.16 KG/M2 | WEIGHT: 211 LBS | OXYGEN SATURATION: 94 % | HEART RATE: 68 BPM | TEMPERATURE: 97.7 F | DIASTOLIC BLOOD PRESSURE: 83 MMHG | HEIGHT: 65 IN

## 2024-03-08 DIAGNOSIS — Z98.890 OTHER SPECIFIED POSTPROCEDURAL STATES: Chronic | ICD-10-CM

## 2024-03-08 DIAGNOSIS — L97.801 NON-PRESSURE CHRONIC ULCER OF OTHER PART OF UNSPECIFIED LOWER LEG LIMITED TO BREAKDOWN OF SKIN: ICD-10-CM

## 2024-03-08 DIAGNOSIS — Z95.0 PRESENCE OF CARDIAC PACEMAKER: Chronic | ICD-10-CM

## 2024-03-08 PROCEDURE — 99213 OFFICE O/P EST LOW 20 MIN: CPT

## 2024-03-08 PROCEDURE — G0463: CPT

## 2024-03-08 NOTE — PHYSICAL EXAM
[4 x 4] : 4 x 4  [Abdominal Pad] : Abdominal Pad [0] : left 0 [Ankle Swelling (On Exam)] : present [Ankle Swelling Bilaterally] : bilaterally  [Ankle Swelling On The Left] : moderate [] : bilaterally [Alert] : alert [Oriented to Person] : oriented to person [Oriented to Place] : oriented to place [Oriented to Time] : oriented to time [Anxious] : anxious [Varicose Veins Of Lower Extremities] : not present [de-identified] : WD overweight 74 Y/O white female in NAD [de-identified] : bilateral lower leg superficial ulcers left greater then right with 3+ pitting edema, No SOI [de-identified] : Circ neuromuscular function WNL post ace compression application, pt expressed comfort. [FreeTextEntry1] : left lower leg- superior weeping ulcer [FreeTextEntry2] : 1.3 [FreeTextEntry4] : 0.1 [FreeTextEntry3] : 0.8 [de-identified] : serous  [de-identified] : intact/pitting edema +1 [de-identified] : dermis  [de-identified] : adaptic touch and silver algiante  [de-identified] : Mechanically cleansed with Sterile gauze and 0.9% Normal Saline [de-identified] : Circ neuromuscular function WNL post ace compression application, pt expressed comfort. [FreeTextEntry7] : left lower leg- inferior weeping ulcer [FreeTextEntry8] : 2.0 [FreeTextEntry9] : 2.8 [de-identified] : 0.1 [de-identified] : serous  [de-identified] : intact/pitting edema+1 [de-identified] : red moist granulation tissue  [de-identified] : adaptic touch and silver alginate  [de-identified] : Mechanically cleansed with Sterile gauze and 0.9% Normal Saline [TWNoteComboBox4] : Moderate [TWNoteComboBox5] : No [TWNoteComboBox6] : Venous [de-identified] : No [de-identified] : other [de-identified] : 100% [de-identified] : None [de-identified] : None [de-identified] : No [de-identified] : Ace wraps [de-identified] : Primary Dressing [de-identified] : 3x Weekly [de-identified] : Moderate [de-identified] : No [de-identified] : Venous [de-identified] : No [de-identified] : other [de-identified] : None [de-identified] : None [de-identified] : No [de-identified] : 100% [de-identified] : Ace wraps [de-identified] : 3x Weekly [de-identified] : Primary Dressing

## 2024-03-08 NOTE — ASSESSMENT
[Verbal] : Verbal [Written] : Written [Demo] : Demo [Patient] : Patient [Good - alert, interested, motivated] : Good - alert, interested, motivated [Verbalizes knowledge/Understanding] : Verbalizes knowledge/understanding [Dressing changes] : dressing changes [Skin Care] : skin care [Signs and symptoms of infection] : sign and symptoms of infection [Venous Disease] : venous disease [Nutrition] : nutrition [How and When to Call] : how and when to call [Compression Therapy] : compression therapy [Off-loading] : off-loading [Patient responsibility to plan of care] : patient responsibility to plan of care [] : Yes [Stable] : stable [Home] : Home [Faxed - Long Term Care/Home Health Agency] : Long Term Care/Home Health Agency: Faxed [Cane] : Cane [FreeTextEntry2] : Infection Prevention Promote Skin Integrity Offloading Elevation Compression Compliance Low Na+ Diet Maintain acceptable levels of pain Demonstrates use of both pharmacological and nonpharmacological pain management interventions diuretic compliance cardiovascular f/u vascular testing  [FreeTextEntry1] : [facility] [FreeTextEntry4] : F/U 1 week  Reinforced elevation, low sodium diet, and compression compliance for edema control Vascular testing scheduled today after this visit

## 2024-03-08 NOTE — PLAN
[FreeTextEntry1] : bilateral stasis ulcers lower legs and bilateral lower leg edema bilateral adaptic,AgAlginate,DD,QOD, Loose ace wraps arterial studies. Patient stated she had venous study when she was hospitalized vascular consult elevate lower legs return 1 week 25 minutes spent in review,evaluation and treatment planning

## 2024-03-08 NOTE — HISTORY OF PRESENT ILLNESS
[FreeTextEntry1] : 74 y/o F with PMHx of atrial fibrillation on xarelto, atrial flutter s/p ablation, bradycardia and syncope s/p PPM, HTN, DM2, HFrEF w/ moderate LV dysfunction, hypothyroid, hyperparathyroidism, depression, pulmonary HTN, preents today with lower leg swelling and edema  She was admitted to [facility] from 1/20/24-1/25/24 for acute congestive heart failure exacerbation and worsening weeping edema, r/o LE wound infection. She was treated with Lasix 40mg IV bid. TTE showed EF 45 to 50 %, global left  Wounds are suspected to be from chronic venous insufficiency. Pt had HTN and her losartan was titrated with better control. Pt presented clinical improvement, responded appropriately to IV and then PO diuresis, cleared for home discharge w/ Lasix 40 daily and losartan 75 daily. Initial PT evaluation recommended discharge to [facility]. Pt declined and continued to work with PT with better mobility once the volume overload improved. Pt will continue home PT. Medication changes: Lasix 40 mg PO daily, Losartan 50 mg daily 1.5 tab daily for total 75 mg daily, Potassium chloride  She FU with Cardio with Dr. Ellis on 2/5, who recommended increase Lasix to 80 mg daily for a few days, and then go back to 40 mg daily. She took 80 mg for 1 day, and felt that it made her tired due to having to go to the bathroom very frequently, so now she is taking 60 mg daily for a few days.  3/8/24 left lower leg ulcers improved. patient finds its difficult to elevate her legs. No SOI

## 2024-03-09 DIAGNOSIS — Z79.899 OTHER LONG TERM (CURRENT) DRUG THERAPY: ICD-10-CM

## 2024-03-09 DIAGNOSIS — Z95.0 PRESENCE OF CARDIAC PACEMAKER: ICD-10-CM

## 2024-03-09 DIAGNOSIS — Z98.890 OTHER SPECIFIED POSTPROCEDURAL STATES: ICD-10-CM

## 2024-03-09 DIAGNOSIS — I45.10 UNSPECIFIED RIGHT BUNDLE-BRANCH BLOCK: ICD-10-CM

## 2024-03-09 DIAGNOSIS — Z79.01 LONG TERM (CURRENT) USE OF ANTICOAGULANTS: ICD-10-CM

## 2024-03-09 DIAGNOSIS — I87.2 VENOUS INSUFFICIENCY (CHRONIC) (PERIPHERAL): ICD-10-CM

## 2024-03-09 DIAGNOSIS — I27.0 PRIMARY PULMONARY HYPERTENSION: ICD-10-CM

## 2024-03-09 DIAGNOSIS — L97.821 NON-PRESSURE CHRONIC ULCER OF OTHER PART OF LEFT LOWER LEG LIMITED TO BREAKDOWN OF SKIN: ICD-10-CM

## 2024-03-09 DIAGNOSIS — E03.9 HYPOTHYROIDISM, UNSPECIFIED: ICD-10-CM

## 2024-03-09 DIAGNOSIS — I50.20 UNSPECIFIED SYSTOLIC (CONGESTIVE) HEART FAILURE: ICD-10-CM

## 2024-03-09 DIAGNOSIS — I48.91 UNSPECIFIED ATRIAL FIBRILLATION: ICD-10-CM

## 2024-03-09 DIAGNOSIS — Z79.4 LONG TERM (CURRENT) USE OF INSULIN: ICD-10-CM

## 2024-03-09 DIAGNOSIS — L97.811 NON-PRESSURE CHRONIC ULCER OF OTHER PART OF RIGHT LOWER LEG LIMITED TO BREAKDOWN OF SKIN: ICD-10-CM

## 2024-03-09 DIAGNOSIS — I11.0 HYPERTENSIVE HEART DISEASE WITH HEART FAILURE: ICD-10-CM

## 2024-03-13 ENCOUNTER — NON-APPOINTMENT (OUTPATIENT)
Age: 74
End: 2024-03-13

## 2024-03-18 ENCOUNTER — APPOINTMENT (OUTPATIENT)
Dept: FAMILY MEDICINE | Facility: CLINIC | Age: 74
End: 2024-03-18

## 2024-03-29 ENCOUNTER — APPOINTMENT (OUTPATIENT)
Dept: WOUND CARE | Facility: HOSPITAL | Age: 74
End: 2024-03-29
Payer: MEDICARE

## 2024-03-29 ENCOUNTER — OUTPATIENT (OUTPATIENT)
Dept: OUTPATIENT SERVICES | Facility: HOSPITAL | Age: 74
LOS: 1 days | Discharge: HOME | End: 2024-03-29
Payer: MEDICARE

## 2024-03-29 VITALS
WEIGHT: 211 LBS | HEART RATE: 57 BPM | TEMPERATURE: 99.1 F | SYSTOLIC BLOOD PRESSURE: 167 MMHG | DIASTOLIC BLOOD PRESSURE: 80 MMHG | OXYGEN SATURATION: 94 % | HEIGHT: 65 IN | RESPIRATION RATE: 18 BRPM | BODY MASS INDEX: 35.16 KG/M2

## 2024-03-29 DIAGNOSIS — Z95.0 PRESENCE OF CARDIAC PACEMAKER: Chronic | ICD-10-CM

## 2024-03-29 DIAGNOSIS — Z98.890 OTHER SPECIFIED POSTPROCEDURAL STATES: Chronic | ICD-10-CM

## 2024-03-29 DIAGNOSIS — I87.2 VENOUS INSUFFICIENCY (CHRONIC) (PERIPHERAL): ICD-10-CM

## 2024-03-29 PROCEDURE — G0463: CPT

## 2024-03-29 PROCEDURE — 99213 OFFICE O/P EST LOW 20 MIN: CPT

## 2024-03-29 NOTE — REVIEW OF SYSTEMS
[Negative] : Psychiatric [FreeTextEntry2] : over weight [FreeTextEntry5] : A Fib, hypertension,RBBB,CHF,Cardiomyopathy, Aortic valve insufficiency [FreeTextEntry6] : pulmonary hypertension [FreeTextEntry1] : venous insufficiency [de-identified] : DM 2 insulin dependent, hypothyroid

## 2024-03-29 NOTE — PLAN
[FreeTextEntry1] : leg elevation advised vascular studies ag alginate, DD, ace 3x a wk f/u 1 wk  time spent 25 mins.

## 2024-03-29 NOTE — PHYSICAL EXAM
[4 x 4] : 4 x 4  [JVD] : no jugular venous distention  [Normal Thyroid] : the thyroid was normal [Normal Heart Sounds] : normal heart sounds [Normal Breath Sounds] : Normal breath sounds [Normal Rate and Rhythm] : normal rate and rhythm [Ankle Swelling Bilaterally] : severe [] : of the left leg [Ankle Swelling On The Right] : mild [Abdomen Masses] : No abdominal massess [Abdomen Tenderness] : ~T ~M No abdominal tenderness [Tender] : nontender [Alert] : alert [Enlarged] : not enlarged [Oriented to Person] : oriented to person [Oriented to Place] : oriented to place [Oriented to Time] : oriented to time [Calm] : calm [de-identified] : elderlyWF, NAD, alert, Ox3. [FreeTextEntry1] : Left Lower Leg  [FreeTextEntry2] : 0.7 [FreeTextEntry3] : 1.1 [FreeTextEntry4] : 0.1 [de-identified] : Small serous drainage noted [de-identified] : none [de-identified] : venous [de-identified] : none [de-identified] : intact [de-identified] : none [de-identified] : none [de-identified] : 100% [de-identified] : none [de-identified] : none [de-identified] : Pt expressed comfort post ACE application. Circ/Neuromuscular functions WNL post application. [de-identified] : Alginate Ag [de-identified] : Mechanically cleansed with sterile gauze and normal saline 0.9% Dry Dressing [TWNoteComboBox5] : No [de-identified] : Ace wraps [de-identified] : 3x Weekly [de-identified] : Primary Dressing

## 2024-03-29 NOTE — ASSESSMENT
[Verbal] : Verbal [Demo] : Demo [Patient] : Patient [Good - alert, interested, motivated] : Good - alert, interested, motivated [Demonstrates independently] : demonstrates independently [Dressing changes] : dressing changes [Skin Care] : skin care [Signs and symptoms of infection] : sign and symptoms of infection [Nutrition] : nutrition [How and When to Call] : how and when to call [Off-loading] : off-loading [Compression Therapy] : compression therapy [Patient responsibility to plan of care] : patient responsibility to plan of care [] : Yes [Stable] : stable [Home] : Home [Faxed - Long Term Care/Home Health Agency] : Long Term Care/Home Health Agency: Faxed [Ambulatory] : Ambulatory [FreeTextEntry2] : Infection prevention  Wound care (dressing changes) Compression therapy Nutrition and wound healing Elevation and low sodium compliance. [FreeTextEntry4] : Patient unable to receive vascular studies. Patient registered with [facility]  Discussed importance of LE Elevation, compression, and low sodium compliance. Patient understanding of same F/U 1-2 Weeks [FreeTextEntry1] : [facility]

## 2024-03-29 NOTE — HISTORY OF PRESENT ILLNESS
[FreeTextEntry1] : 74 y/o F with PMHx of atrial fibrillation on xarelto, atrial flutter s/p ablation, bradycardia and syncope s/p PPM, HTN, DM2, HFrEF w/ moderate LV dysfunction, hypothyroid, hyperparathyroidism, depression, pulmonary HTN, preents today with lower leg swelling and edema  She was admitted to [facility] from 1/20/24-1/25/24 for acute congestive heart failure exacerbation and worsening weeping edema, r/o LE wound infection. She was treated with Lasix 40mg IV bid. TTE showed EF 45 to 50 %, global left Wounds are suspected to be from chronic venous insufficiency. Pt had HTN and her losartan was titrated with better control. Pt presented clinical improvement, responded appropriately to IV and then PO diuresis, cleared for home discharge w/ Lasix 40 daily and losartan 75 daily. Initial PT evaluation recommended discharge to [facility]. Pt declined and continued to work with PT with better mobility once the volume overload improved. Pt will continue home PT. Medication changes: Lasix 40 mg PO daily, Losartan 50 mg daily 1.5 tab daily for total 75 mg daily, Potassium chloride  She FU with Cardio with Dr. Ellis on 2/5, who recommended increase Lasix to 80 mg daily for a few days, and then go back to 40 mg daily. She took 80 mg for 1 day, and felt that it made her tired due to having to go to the bathroom very frequently, so now she is taking 60 mg daily for a few days.       On today's visit, pt continues to have mod to severe edema in BLE. New small weeping ulcers have formed on the LLE. No SOI. THe RLE has no ulcers. Pt unable to go for vascular studies because she is unable to lay down for a period of time. I emphasized the importance of leg elevation and compression to help these ulcers heal and for preventative measures. No SOI.

## 2024-03-30 DIAGNOSIS — I50.20 UNSPECIFIED SYSTOLIC (CONGESTIVE) HEART FAILURE: ICD-10-CM

## 2024-03-30 DIAGNOSIS — I45.10 UNSPECIFIED RIGHT BUNDLE-BRANCH BLOCK: ICD-10-CM

## 2024-03-30 DIAGNOSIS — Z79.4 LONG TERM (CURRENT) USE OF INSULIN: ICD-10-CM

## 2024-03-30 DIAGNOSIS — I11.0 HYPERTENSIVE HEART DISEASE WITH HEART FAILURE: ICD-10-CM

## 2024-03-30 DIAGNOSIS — I27.0 PRIMARY PULMONARY HYPERTENSION: ICD-10-CM

## 2024-03-30 DIAGNOSIS — L97.821 NON-PRESSURE CHRONIC ULCER OF OTHER PART OF LEFT LOWER LEG LIMITED TO BREAKDOWN OF SKIN: ICD-10-CM

## 2024-03-30 DIAGNOSIS — Z95.0 PRESENCE OF CARDIAC PACEMAKER: ICD-10-CM

## 2024-03-30 DIAGNOSIS — Z79.01 LONG TERM (CURRENT) USE OF ANTICOAGULANTS: ICD-10-CM

## 2024-03-30 DIAGNOSIS — L97.811 NON-PRESSURE CHRONIC ULCER OF OTHER PART OF RIGHT LOWER LEG LIMITED TO BREAKDOWN OF SKIN: ICD-10-CM

## 2024-03-30 DIAGNOSIS — E03.9 HYPOTHYROIDISM, UNSPECIFIED: ICD-10-CM

## 2024-03-30 DIAGNOSIS — I87.2 VENOUS INSUFFICIENCY (CHRONIC) (PERIPHERAL): ICD-10-CM

## 2024-03-30 DIAGNOSIS — Z79.899 OTHER LONG TERM (CURRENT) DRUG THERAPY: ICD-10-CM

## 2024-03-30 DIAGNOSIS — Z98.890 OTHER SPECIFIED POSTPROCEDURAL STATES: ICD-10-CM

## 2024-03-30 DIAGNOSIS — I48.91 UNSPECIFIED ATRIAL FIBRILLATION: ICD-10-CM

## 2024-03-30 DIAGNOSIS — R60.0 LOCALIZED EDEMA: ICD-10-CM

## 2024-04-08 ENCOUNTER — APPOINTMENT (OUTPATIENT)
Dept: WOUND CARE | Facility: HOSPITAL | Age: 74
End: 2024-04-08

## 2024-04-15 ENCOUNTER — NON-APPOINTMENT (OUTPATIENT)
Age: 74
End: 2024-04-15

## 2024-04-16 ENCOUNTER — APPOINTMENT (OUTPATIENT)
Dept: WOUND CARE | Facility: HOSPITAL | Age: 74
End: 2024-04-16
Payer: MEDICARE

## 2024-04-16 ENCOUNTER — OUTPATIENT (OUTPATIENT)
Dept: OUTPATIENT SERVICES | Facility: HOSPITAL | Age: 74
LOS: 1 days | Discharge: HOME | End: 2024-04-16
Payer: MEDICARE

## 2024-04-16 ENCOUNTER — NON-APPOINTMENT (OUTPATIENT)
Age: 74
End: 2024-04-16

## 2024-04-16 VITALS
RESPIRATION RATE: 18 BRPM | WEIGHT: 211 LBS | OXYGEN SATURATION: 97 % | HEIGHT: 65 IN | HEART RATE: 66 BPM | DIASTOLIC BLOOD PRESSURE: 82 MMHG | TEMPERATURE: 97 F | SYSTOLIC BLOOD PRESSURE: 166 MMHG | BODY MASS INDEX: 35.16 KG/M2

## 2024-04-16 DIAGNOSIS — I87.2 VENOUS INSUFFICIENCY (CHRONIC) (PERIPHERAL): ICD-10-CM

## 2024-04-16 DIAGNOSIS — Z95.0 PRESENCE OF CARDIAC PACEMAKER: Chronic | ICD-10-CM

## 2024-04-16 DIAGNOSIS — L97.811 NON-PRESSURE CHRONIC ULCER OF OTHER PART OF RIGHT LOWER LEG LIMITED TO BREAKDOWN OF SKIN: ICD-10-CM

## 2024-04-16 DIAGNOSIS — Z98.890 OTHER SPECIFIED POSTPROCEDURAL STATES: Chronic | ICD-10-CM

## 2024-04-16 DIAGNOSIS — L97.821 NON-PRESSURE CHRONIC ULCER OF OTHER PART OF LEFT LOWER LEG LIMITED TO BREAKDOWN OF SKIN: ICD-10-CM

## 2024-04-16 PROCEDURE — G0463: CPT

## 2024-04-16 PROCEDURE — 99213 OFFICE O/P EST LOW 20 MIN: CPT

## 2024-04-16 NOTE — PHYSICAL EXAM
[Normal Thyroid] : the thyroid was normal [Normal Breath Sounds] : Normal breath sounds [Normal Heart Sounds] : normal heart sounds [Normal Rate and Rhythm] : normal rate and rhythm [Ankle Swelling Bilaterally] : severe [] : of the left leg [Ankle Swelling On The Right] : mild [Alert] : alert [Oriented to Person] : oriented to person [Oriented to Place] : oriented to place [Oriented to Time] : oriented to time [Calm] : calm [JVD] : no jugular venous distention  [Abdomen Masses] : No abdominal massess [Abdomen Tenderness] : ~T ~M No abdominal tenderness [Tender] : nontender [Enlarged] : not enlarged [de-identified] : elderlyWF, NAD, alert, Ox3. [4 x 4] : 4 x 4  [FreeTextEntry1] : lower leg(scattered areas) [de-identified] : serosanguinous drainage [de-identified] : Adaptic touch, silver alginate [de-identified] : Mechanically cleansed with normal saline. Adaptic touch and silver alginate applied, DSD, Kerlix and ace wrap to left leg.  [TWNoteComboBox1] : Left [TWNoteComboBox4] : Large [TWNoteComboBox5] : No [TWNoteComboBox6] : Venous [de-identified] : No [de-identified] : Normal [de-identified] : None [de-identified] : None [de-identified] : 100% [de-identified] : No [de-identified] : Ace wraps [de-identified] : 3x Weekly [de-identified] : Primary Dressing

## 2024-04-16 NOTE — HISTORY OF PRESENT ILLNESS
[FreeTextEntry1] : 72 y/o F with PMHx of atrial fibrillation on xarelto, atrial flutter s/p ablation, bradycardia and syncope s/p PPM, HTN, DM2, HFrEF w/ moderate LV dysfunction, hypothyroid, hyperparathyroidism, depression, pulmonary HTN, preents today with lower leg swelling and edema  She was admitted to [facility] from 1/20/24-1/25/24 for acute congestive heart failure exacerbation and worsening weeping edema, r/o LE wound infection. She was treated with Lasix 40mg IV bid. TTE showed EF 45 to 50 %, global left Wounds are suspected to be from chronic venous insufficiency. Pt had HTN and her losartan was titrated with better control. Pt presented clinical improvement, responded appropriately to IV and then PO diuresis, cleared for home discharge w/ Lasix 40 daily and losartan 75 daily. Initial PT evaluation recommended discharge to [facility]. Pt declined and continued to work with PT with better mobility once the volume overload improved. Pt will continue home PT. Medication changes: Lasix 40 mg PO daily, Losartan 50 mg daily 1.5 tab daily for total 75 mg daily, Potassium chloride  She FU with Cardio with Dr. Ellis on 2/5, who recommended increase Lasix to 80 mg daily for a few days, and then go back to 40 mg daily. She took 80 mg for 1 day, and felt that it made her tired due to having to go to the bathroom very frequently, so now she is taking 60 mg daily for a few days.       On today's visit, pt continues to have mod to severe edema in BLE. New small weeping ulcers have formed on the LLE. No SOI. THe RLE has no ulcers. Pt unable to go for vascular studies because she is unable to lay down for a period of time. I emphasized the importance of leg elevation and compression to help these ulcers heal and for preventative measures. No SOI. 4/16/24 left lower leg with some blistering and open areas. No signs of infection. 3+ pitting edema in both lower legs. Patient states that the edema goes to her thighs. Saw her cardiologist 3 days ago and is on Lasix 20mg was not increased.

## 2024-04-16 NOTE — ASSESSMENT
[Verbal] : Verbal [Written] : Written [Patient] : Patient [Good - alert, interested, motivated] : Good - alert, interested, motivated [Verbalizes knowledge/Understanding] : Verbalizes knowledge/understanding [Foot Care] : foot care [Skin Care] : skin care [Signs and symptoms of infection] : sign and symptoms of infection [Venous Disease] : venous disease [How and When to Call] : how and when to call [Compression Therapy] : compression therapy [] : Yes [FreeTextEntry4] : Patient will apply adaptic touch and silver alginate to lower legs. Will elevate legs at home. Will follow up with Dr. Dumont in 3 weeks. NAD noted. Task sent to manager to order supplies.   [FreeTextEntry2] : 1. Maintain skin integrity. 2. Proper nutrition. 3. Prevent infection. [Stable] : stable [Home] : Home [Walker] : Walker

## 2024-04-16 NOTE — VITALS
[Pain related to present condition?] : The patient's  pain is not related to present condition. [] : No [de-identified] : 0/10

## 2024-04-16 NOTE — REVIEW OF SYSTEMS
[Negative] : Heme/Lymph [FreeTextEntry2] : over weight [FreeTextEntry5] : A Fib, hypertension,RBBB,CHF,Cardiomyopathy, Aortic valve insufficiency [FreeTextEntry6] : pulmonary hypertension [de-identified] : DM 2 insulin dependent, hypothyroid [FreeTextEntry1] : venous insufficiency

## 2024-04-16 NOTE — PLAN
[FreeTextEntry1] : leg elevation stressed ag alginate, adaptic, DD, ace 3x a wk patient offered to come in for dressing changes as she has lost her home care f/u 2 wk  time spent 25 mins.

## 2024-04-17 DIAGNOSIS — I87.2 VENOUS INSUFFICIENCY (CHRONIC) (PERIPHERAL): ICD-10-CM

## 2024-04-17 DIAGNOSIS — I50.20 UNSPECIFIED SYSTOLIC (CONGESTIVE) HEART FAILURE: ICD-10-CM

## 2024-04-17 DIAGNOSIS — E03.9 HYPOTHYROIDISM, UNSPECIFIED: ICD-10-CM

## 2024-04-17 DIAGNOSIS — L97.811 NON-PRESSURE CHRONIC ULCER OF OTHER PART OF RIGHT LOWER LEG LIMITED TO BREAKDOWN OF SKIN: ICD-10-CM

## 2024-04-17 DIAGNOSIS — I45.10 UNSPECIFIED RIGHT BUNDLE-BRANCH BLOCK: ICD-10-CM

## 2024-04-17 DIAGNOSIS — L97.821 NON-PRESSURE CHRONIC ULCER OF OTHER PART OF LEFT LOWER LEG LIMITED TO BREAKDOWN OF SKIN: ICD-10-CM

## 2024-04-17 DIAGNOSIS — Z79.01 LONG TERM (CURRENT) USE OF ANTICOAGULANTS: ICD-10-CM

## 2024-04-17 DIAGNOSIS — Z79.4 LONG TERM (CURRENT) USE OF INSULIN: ICD-10-CM

## 2024-04-17 DIAGNOSIS — I11.0 HYPERTENSIVE HEART DISEASE WITH HEART FAILURE: ICD-10-CM

## 2024-04-17 DIAGNOSIS — I48.91 UNSPECIFIED ATRIAL FIBRILLATION: ICD-10-CM

## 2024-04-17 DIAGNOSIS — Z95.0 PRESENCE OF CARDIAC PACEMAKER: ICD-10-CM

## 2024-04-17 DIAGNOSIS — R60.0 LOCALIZED EDEMA: ICD-10-CM

## 2024-04-17 DIAGNOSIS — Z98.890 OTHER SPECIFIED POSTPROCEDURAL STATES: ICD-10-CM

## 2024-04-17 DIAGNOSIS — Z79.899 OTHER LONG TERM (CURRENT) DRUG THERAPY: ICD-10-CM

## 2024-04-25 ENCOUNTER — INPATIENT (INPATIENT)
Facility: HOSPITAL | Age: 74
LOS: 6 days | Discharge: HOME | DRG: 291 | End: 2024-05-02
Attending: STUDENT IN AN ORGANIZED HEALTH CARE EDUCATION/TRAINING PROGRAM | Admitting: STUDENT IN AN ORGANIZED HEALTH CARE EDUCATION/TRAINING PROGRAM
Payer: MEDICARE

## 2024-04-25 VITALS
HEIGHT: 65 IN | WEIGHT: 240.08 LBS | DIASTOLIC BLOOD PRESSURE: 104 MMHG | SYSTOLIC BLOOD PRESSURE: 170 MMHG | HEART RATE: 66 BPM | OXYGEN SATURATION: 96 % | TEMPERATURE: 98 F | RESPIRATION RATE: 18 BRPM

## 2024-04-25 DIAGNOSIS — G47.00 INSOMNIA, UNSPECIFIED: ICD-10-CM

## 2024-04-25 DIAGNOSIS — I50.9 HEART FAILURE, UNSPECIFIED: ICD-10-CM

## 2024-04-25 DIAGNOSIS — Z98.890 OTHER SPECIFIED POSTPROCEDURAL STATES: Chronic | ICD-10-CM

## 2024-04-25 DIAGNOSIS — Z95.0 PRESENCE OF CARDIAC PACEMAKER: Chronic | ICD-10-CM

## 2024-04-25 DIAGNOSIS — I48.91 UNSPECIFIED ATRIAL FIBRILLATION: ICD-10-CM

## 2024-04-25 DIAGNOSIS — E11.9 TYPE 2 DIABETES MELLITUS WITHOUT COMPLICATIONS: ICD-10-CM

## 2024-04-25 DIAGNOSIS — R60.0 LOCALIZED EDEMA: ICD-10-CM

## 2024-04-25 DIAGNOSIS — E03.9 HYPOTHYROIDISM, UNSPECIFIED: ICD-10-CM

## 2024-04-25 DIAGNOSIS — Z29.9 ENCOUNTER FOR PROPHYLACTIC MEASURES, UNSPECIFIED: ICD-10-CM

## 2024-04-25 LAB
ALBUMIN SERPL ELPH-MCNC: 3.2 G/DL — LOW (ref 3.3–5)
ALP SERPL-CCNC: 130 U/L — HIGH (ref 40–120)
ALT FLD-CCNC: 17 U/L — SIGNIFICANT CHANGE UP (ref 12–78)
ANION GAP SERPL CALC-SCNC: 5 MMOL/L — SIGNIFICANT CHANGE UP (ref 5–17)
APTT BLD: 34.4 SEC — SIGNIFICANT CHANGE UP (ref 24.5–35.6)
AST SERPL-CCNC: 25 U/L — SIGNIFICANT CHANGE UP (ref 15–37)
BASOPHILS # BLD AUTO: 0.05 K/UL — SIGNIFICANT CHANGE UP (ref 0–0.2)
BASOPHILS NFR BLD AUTO: 1.1 % — SIGNIFICANT CHANGE UP (ref 0–2)
BILIRUB SERPL-MCNC: 0.7 MG/DL — SIGNIFICANT CHANGE UP (ref 0.2–1.2)
BUN SERPL-MCNC: 17 MG/DL — SIGNIFICANT CHANGE UP (ref 7–23)
CALCIUM SERPL-MCNC: 8.3 MG/DL — LOW (ref 8.5–10.1)
CHLORIDE SERPL-SCNC: 101 MMOL/L — SIGNIFICANT CHANGE UP (ref 96–108)
CO2 SERPL-SCNC: 34 MMOL/L — HIGH (ref 22–31)
CREAT SERPL-MCNC: 0.77 MG/DL — SIGNIFICANT CHANGE UP (ref 0.5–1.3)
EGFR: 80 ML/MIN/1.73M2 — SIGNIFICANT CHANGE UP
EOSINOPHIL # BLD AUTO: 0.24 K/UL — SIGNIFICANT CHANGE UP (ref 0–0.5)
EOSINOPHIL NFR BLD AUTO: 5.2 % — SIGNIFICANT CHANGE UP (ref 0–6)
GLUCOSE SERPL-MCNC: 179 MG/DL — HIGH (ref 70–99)
HCT VFR BLD CALC: 41.7 % — SIGNIFICANT CHANGE UP (ref 34.5–45)
HGB BLD-MCNC: 12.5 G/DL — SIGNIFICANT CHANGE UP (ref 11.5–15.5)
IMM GRANULOCYTES NFR BLD AUTO: 0.4 % — SIGNIFICANT CHANGE UP (ref 0–0.9)
INR BLD: 1.46 RATIO — HIGH (ref 0.85–1.18)
LYMPHOCYTES # BLD AUTO: 1.31 K/UL — SIGNIFICANT CHANGE UP (ref 1–3.3)
LYMPHOCYTES # BLD AUTO: 28.6 % — SIGNIFICANT CHANGE UP (ref 13–44)
MAGNESIUM SERPL-MCNC: 2.1 MG/DL — SIGNIFICANT CHANGE UP (ref 1.6–2.6)
MCHC RBC-ENTMCNC: 28.2 PG — SIGNIFICANT CHANGE UP (ref 27–34)
MCHC RBC-ENTMCNC: 30 GM/DL — LOW (ref 32–36)
MCV RBC AUTO: 93.9 FL — SIGNIFICANT CHANGE UP (ref 80–100)
MONOCYTES # BLD AUTO: 0.45 K/UL — SIGNIFICANT CHANGE UP (ref 0–0.9)
MONOCYTES NFR BLD AUTO: 9.8 % — SIGNIFICANT CHANGE UP (ref 2–14)
NEUTROPHILS # BLD AUTO: 2.51 K/UL — SIGNIFICANT CHANGE UP (ref 1.8–7.4)
NEUTROPHILS NFR BLD AUTO: 54.9 % — SIGNIFICANT CHANGE UP (ref 43–77)
NRBC # BLD: 0 /100 WBCS — SIGNIFICANT CHANGE UP (ref 0–0)
NT-PROBNP SERPL-SCNC: 1034 PG/ML — HIGH (ref 0–450)
PLATELET # BLD AUTO: 156 K/UL — SIGNIFICANT CHANGE UP (ref 150–400)
POTASSIUM SERPL-MCNC: 4.1 MMOL/L — SIGNIFICANT CHANGE UP (ref 3.5–5.3)
POTASSIUM SERPL-SCNC: 4.1 MMOL/L — SIGNIFICANT CHANGE UP (ref 3.5–5.3)
PROT SERPL-MCNC: 7.4 G/DL — SIGNIFICANT CHANGE UP (ref 6–8.3)
PROTHROM AB SERPL-ACNC: 16.9 SEC — HIGH (ref 9.5–13)
RBC # BLD: 4.44 M/UL — SIGNIFICANT CHANGE UP (ref 3.8–5.2)
RBC # FLD: 15.3 % — HIGH (ref 10.3–14.5)
SODIUM SERPL-SCNC: 140 MMOL/L — SIGNIFICANT CHANGE UP (ref 135–145)
TROPONIN I, HIGH SENSITIVITY RESULT: 38 NG/L — SIGNIFICANT CHANGE UP
WBC # BLD: 4.58 K/UL — SIGNIFICANT CHANGE UP (ref 3.8–10.5)
WBC # FLD AUTO: 4.58 K/UL — SIGNIFICANT CHANGE UP (ref 3.8–10.5)

## 2024-04-25 PROCEDURE — 99285 EMERGENCY DEPT VISIT HI MDM: CPT

## 2024-04-25 PROCEDURE — 99223 1ST HOSP IP/OBS HIGH 75: CPT | Mod: GC

## 2024-04-25 PROCEDURE — 93010 ELECTROCARDIOGRAM REPORT: CPT

## 2024-04-25 PROCEDURE — 71045 X-RAY EXAM CHEST 1 VIEW: CPT | Mod: 26

## 2024-04-25 RX ORDER — ACETAMINOPHEN 500 MG
650 TABLET ORAL EVERY 6 HOURS
Refills: 0 | Status: DISCONTINUED | OUTPATIENT
Start: 2024-04-25 | End: 2024-05-02

## 2024-04-25 RX ORDER — INSULIN GLARGINE 100 [IU]/ML
23 INJECTION, SOLUTION SUBCUTANEOUS AT BEDTIME
Refills: 0 | Status: DISCONTINUED | OUTPATIENT
Start: 2024-04-26 | End: 2024-04-26

## 2024-04-25 RX ORDER — DEXTROSE 50 % IN WATER 50 %
25 SYRINGE (ML) INTRAVENOUS ONCE
Refills: 0 | Status: DISCONTINUED | OUTPATIENT
Start: 2024-04-25 | End: 2024-04-25

## 2024-04-25 RX ORDER — DEXTROSE 10 % IN WATER 10 %
125 INTRAVENOUS SOLUTION INTRAVENOUS ONCE
Refills: 0 | Status: DISCONTINUED | OUTPATIENT
Start: 2024-04-25 | End: 2024-05-02

## 2024-04-25 RX ORDER — DEXTROSE 50 % IN WATER 50 %
12.5 SYRINGE (ML) INTRAVENOUS ONCE
Refills: 0 | Status: DISCONTINUED | OUTPATIENT
Start: 2024-04-25 | End: 2024-05-02

## 2024-04-25 RX ORDER — DEXTROSE 10 % IN WATER 10 %
125 INTRAVENOUS SOLUTION INTRAVENOUS ONCE
Refills: 0 | Status: DISCONTINUED | OUTPATIENT
Start: 2024-04-25 | End: 2024-04-25

## 2024-04-25 RX ORDER — DEXTROSE 50 % IN WATER 50 %
15 SYRINGE (ML) INTRAVENOUS ONCE
Refills: 0 | Status: DISCONTINUED | OUTPATIENT
Start: 2024-04-25 | End: 2024-05-02

## 2024-04-25 RX ORDER — SODIUM CHLORIDE 9 MG/ML
1000 INJECTION, SOLUTION INTRAVENOUS
Refills: 0 | Status: DISCONTINUED | OUTPATIENT
Start: 2024-04-25 | End: 2024-04-25

## 2024-04-25 RX ORDER — INSULIN LISPRO 100/ML
VIAL (ML) SUBCUTANEOUS AT BEDTIME
Refills: 0 | Status: DISCONTINUED | OUTPATIENT
Start: 2024-04-25 | End: 2024-04-25

## 2024-04-25 RX ORDER — INSULIN LISPRO 100/ML
7 VIAL (ML) SUBCUTANEOUS
Refills: 0 | Status: DISCONTINUED | OUTPATIENT
Start: 2024-04-25 | End: 2024-05-01

## 2024-04-25 RX ORDER — DEXTROSE 50 % IN WATER 50 %
15 SYRINGE (ML) INTRAVENOUS ONCE
Refills: 0 | Status: DISCONTINUED | OUTPATIENT
Start: 2024-04-25 | End: 2024-04-25

## 2024-04-25 RX ORDER — ZOLPIDEM TARTRATE 10 MG/1
5 TABLET ORAL AT BEDTIME
Refills: 0 | Status: DISCONTINUED | OUTPATIENT
Start: 2024-04-25 | End: 2024-05-01

## 2024-04-25 RX ORDER — GLUCAGON INJECTION, SOLUTION 0.5 MG/.1ML
1 INJECTION, SOLUTION SUBCUTANEOUS ONCE
Refills: 0 | Status: DISCONTINUED | OUTPATIENT
Start: 2024-04-25 | End: 2024-05-02

## 2024-04-25 RX ORDER — SODIUM CHLORIDE 9 MG/ML
1000 INJECTION, SOLUTION INTRAVENOUS
Refills: 0 | Status: DISCONTINUED | OUTPATIENT
Start: 2024-04-25 | End: 2024-05-02

## 2024-04-25 RX ORDER — INSULIN LISPRO 100/ML
VIAL (ML) SUBCUTANEOUS
Refills: 0 | Status: DISCONTINUED | OUTPATIENT
Start: 2024-04-25 | End: 2024-05-01

## 2024-04-25 RX ORDER — INSULIN GLARGINE 100 [IU]/ML
37.5 INJECTION, SOLUTION SUBCUTANEOUS AT BEDTIME
Refills: 0 | Status: DISCONTINUED | OUTPATIENT
Start: 2024-04-25 | End: 2024-04-25

## 2024-04-25 RX ORDER — LANOLIN ALCOHOL/MO/W.PET/CERES
3 CREAM (GRAM) TOPICAL AT BEDTIME
Refills: 0 | Status: DISCONTINUED | OUTPATIENT
Start: 2024-04-25 | End: 2024-05-02

## 2024-04-25 RX ORDER — LEVOTHYROXINE SODIUM 125 MCG
125 TABLET ORAL DAILY
Refills: 0 | Status: DISCONTINUED | OUTPATIENT
Start: 2024-04-25 | End: 2024-05-02

## 2024-04-25 RX ORDER — FUROSEMIDE 40 MG
40 TABLET ORAL
Refills: 0 | Status: DISCONTINUED | OUTPATIENT
Start: 2024-04-25 | End: 2024-04-28

## 2024-04-25 RX ORDER — GLUCAGON INJECTION, SOLUTION 0.5 MG/.1ML
1 INJECTION, SOLUTION SUBCUTANEOUS ONCE
Refills: 0 | Status: DISCONTINUED | OUTPATIENT
Start: 2024-04-25 | End: 2024-04-25

## 2024-04-25 RX ORDER — DEXTROSE 50 % IN WATER 50 %
25 SYRINGE (ML) INTRAVENOUS ONCE
Refills: 0 | Status: DISCONTINUED | OUTPATIENT
Start: 2024-04-25 | End: 2024-05-02

## 2024-04-25 RX ORDER — INSULIN LISPRO 100/ML
VIAL (ML) SUBCUTANEOUS
Refills: 0 | Status: DISCONTINUED | OUTPATIENT
Start: 2024-04-25 | End: 2024-04-25

## 2024-04-25 RX ORDER — ONDANSETRON 8 MG/1
4 TABLET, FILM COATED ORAL EVERY 8 HOURS
Refills: 0 | Status: DISCONTINUED | OUTPATIENT
Start: 2024-04-25 | End: 2024-05-02

## 2024-04-25 RX ORDER — CARVEDILOL PHOSPHATE 80 MG/1
12.5 CAPSULE, EXTENDED RELEASE ORAL EVERY 12 HOURS
Refills: 0 | Status: DISCONTINUED | OUTPATIENT
Start: 2024-04-25 | End: 2024-05-02

## 2024-04-25 RX ORDER — DEXTROSE 50 % IN WATER 50 %
12.5 SYRINGE (ML) INTRAVENOUS ONCE
Refills: 0 | Status: DISCONTINUED | OUTPATIENT
Start: 2024-04-25 | End: 2024-04-25

## 2024-04-25 RX ORDER — DIGOXIN 250 MCG
250 TABLET ORAL DAILY
Refills: 0 | Status: DISCONTINUED | OUTPATIENT
Start: 2024-04-25 | End: 2024-05-02

## 2024-04-25 RX ORDER — FUROSEMIDE 40 MG
40 TABLET ORAL ONCE
Refills: 0 | Status: COMPLETED | OUTPATIENT
Start: 2024-04-25 | End: 2024-04-25

## 2024-04-25 RX ORDER — RIVAROXABAN 15 MG-20MG
20 KIT ORAL
Refills: 0 | Status: DISCONTINUED | OUTPATIENT
Start: 2024-04-25 | End: 2024-05-02

## 2024-04-25 RX ORDER — INSULIN LISPRO 100/ML
VIAL (ML) SUBCUTANEOUS AT BEDTIME
Refills: 0 | Status: DISCONTINUED | OUTPATIENT
Start: 2024-04-25 | End: 2024-05-01

## 2024-04-25 RX ADMIN — Medication 40 MILLIGRAM(S): at 19:12

## 2024-04-25 RX ADMIN — ZOLPIDEM TARTRATE 5 MILLIGRAM(S): 10 TABLET ORAL at 23:12

## 2024-04-25 RX ADMIN — Medication 650 MILLIGRAM(S): at 22:51

## 2024-04-25 RX ADMIN — Medication 650 MILLIGRAM(S): at 23:50

## 2024-04-25 NOTE — ED ADULT NURSE NOTE - OBJECTIVE STATEMENT
A/ox4 patient BIBEMS for shortness of breath and "fluid retention" according to patient. Patient has b/l lower extremity weeping edema. Sat 98% room air, no O2 needed. Patient states she takes lasix every morning. Pending further labs and radiology reports.

## 2024-04-25 NOTE — ED PROVIDER NOTE - OBJECTIVE STATEMENT
Patient with a past medical history of atrial fibrillation on Xarelto, bradycardia status post pacemaker, hypertension, diabetes, cardiomyopathy, pulmonary hypertension is presenting with lower extremity edema.  States that this similar presentation happened to her previously when she was admitted to the hospital.  Endorse some shortness of breath with her symptoms.  Denies any chest pain.  States that she saw her endocrinologist and was told she had gained 35 pounds in water weight due to the swelling in her legs.  Denies any pain in her legs.  States that ambulation has been more difficult for her recently due to her symptoms.

## 2024-04-25 NOTE — ED PROVIDER NOTE - PROGRESS NOTE DETAILS
Patient with likely heart failure exacerbation.  proBNP elevated.  Patient given IV Lasix.  Spoke with Dr. Ching for admission.  Morning cardiology consult placed.  Bridger Madrid MD.

## 2024-04-25 NOTE — ED PROVIDER NOTE - PHYSICAL EXAMINATION
Constitutional: Awake, Alert, non-toxic. No acute distress.  HEAD: Normocephalic, atraumatic.   EYES: PERRL, EOM intact, conjunctiva and sclera are clear bilaterally.  ENT: External ears normal. No rhinorrhea, no tracheal deviation   NECK: Supple, non-tender  CARDIOVASCULAR: regular rate and rhythm.  RESPIRATORY: Normal respiratory effort; breath sounds bibasilar crackles, Speaking in full sentences. No accessory muscle use.   ABDOMEN: Soft; non-tender, non-distended. No rebound or guarding.   MSK:  2+ b/l lower extremity edema with some weeping, no deformities  SKIN: Warm, dry  NEURO: A&O x3. Sensory and motor functions are grossly intact. Speech is normal. No facial droop.  PSYCH: Appearance and judgement seem appropriate for gender and age.

## 2024-04-25 NOTE — ED ADULT NURSE REASSESSMENT NOTE - NS ED NURSE REASSESS COMMENT FT1
Patient requested for warm blanket at this time ; call bell provided and reminded with call bell use; advised patient is admitted waiting for bed assignment

## 2024-04-25 NOTE — H&P ADULT - PROBLEM SELECTOR PLAN 3
DM on humalong 90U AM and 90U PM at home  - start on 30U BID  - low dose ISS Recommended cont humulin R U-500 45 units bid by Endo last admission   Will continue conversion Lantus 23 units at bedtime with Lispro 7 units premeal with MDISS (discussed with pharmacy)

## 2024-04-25 NOTE — H&P ADULT - NSHPPHYSICALEXAM_GEN_ALL_CORE
T(C): 36.9 (04-25-24 @ 16:38), Max: 36.9 (04-25-24 @ 16:38)  HR: 66 (04-25-24 @ 16:38) (66 - 66)  BP: 170/104 (04-25-24 @ 16:38) (170/104 - 170/104)  RR: 18 (04-25-24 @ 16:38) (18 - 18)  SpO2: 96% (04-25-24 @ 16:38) (96% - 96%)    PHYSICAL EXAM:  GENERAL: NAD, lying in bed comfortably  HEAD:  Atraumatic, Normocephalic  EYES: EOMI, PERRLA, conjunctiva and sclera clear  ENT: Moist mucous membranes  NECK: Supple, No JVD  CHEST/LUNG:  + Bibasilar crackles,  HEART: Regular rate and rhythm; No murmurs, rubs, or gallops  ABDOMEN: Bowel sounds present; Soft, Nontender, Nondistended. No hepatomegally  EXTREMITIES:  2+ pitting edema b/l with weeping   NERVOUS SYSTEM:  Alert & Oriented X3, speech clear. No deficits

## 2024-04-25 NOTE — H&P ADULT - ATTENDING COMMENTS
Hospitalist attending  I have personally seen and examined the patient.  I fully participated in the care of this patient.  I have made amendments to the documentation where necessary, and agree with the history, physical exam, and plan as documented by the resident.    Vital Signs Last 24 Hrs  T(F): 98.1 (25 Apr 2024 22:07), Max: 98.5 (25 Apr 2024 16:38)  HR: 60 (25 Apr 2024 22:07) (60 - 66)  BP: 137/62 (25 Apr 2024 22:07) (137/62 - 170/104)  RR: 18 (25 Apr 2024 22:07) (18 - 18)  SpO2: 95% (25 Apr 2024 22:07) (95% - 96%)    Physical Exam:  GENERAL: NAD, lying in bed comfortably  HEAD:  Atraumatic, Normocephalic  EYES: EOMI, PERRLA, conjunctiva and sclera clear  ENT: Moist mucous membranes  NECK: Supple, No JVD  CHEST/LUNG:  + Bibasilar crackles,  HEART: Regular rate and rhythm; No murmurs, rubs, or gallops  ABDOMEN: Bowel sounds present; Soft, Nontender, Nondistended. No hepatomegaly  EXTREMITIES:  2+ pitting edema b/l with weeping   NERVOUS SYSTEM:  Alert & Oriented X3, speech clear. No deficits    73 year old female with a past medical history of atrial fibrillation on Xarelto, bradycardia status post pacemaker, hypertension, diabetes, cardiomyopathy, pulmonary hypertension is presenting with lower extremity edema. Admit for CHF exacerbation.     #Acute on chronic diastolic CHF exacerbation- will c/w IV lasix 40mg BID. F/u TTE. F/u cards recs (Dr. Grewal). Tele monitoring.  #Afib- will c/w coreg, digoxin, and xarelto. F/u digoxin level in AM.  #T2DM- will c/w insulin    PT eval pending

## 2024-04-25 NOTE — ED ADULT NURSE NOTE - CAS TRG GEN SKIN COLOR
Render In Strict Bullet Format?: No Detail Level: Zone Initiate Treatment: Doxycycline 100mg bid x 21 days Continue Regimen: clobetasol 0.05 % topical cream: Apply twice daily to rash up to 2 weeks/month as needed.\\n\\nButenafine bid mixed with clobetasol Normal for race

## 2024-04-25 NOTE — H&P ADULT - ASSESSMENT
73 year old female with a past medical history of atrial fibrillation on Xarelto, bradycardia status post pacemaker, hypertension, diabetes, cardiomyopathy, pulmonary hypertension is presenting with lower extremity edema. Admit for CHF exacerbation.

## 2024-04-25 NOTE — H&P ADULT - PROBLEM SELECTOR PLAN 2
atrial fibrillation on xarelto, atrial flutter s/p ablation, bradycardia and syncope s/p ppm implant,   - continue home xarelto  - remote tele.

## 2024-04-25 NOTE — H&P ADULT - NSHPSOCIALHISTORY_GEN_ALL_CORE
Denies alcohol, tobacco and recreational drug use  Lives alone, not vaccinated.. Partner passed away in July, 2023.

## 2024-04-25 NOTE — ED PROVIDER NOTE - CLINICAL SUMMARY MEDICAL DECISION MAKING FREE TEXT BOX
Patient with significant bilateral lower extremity edema, likely in setting of her known heart failure.  Reports recent medication changes which may be the cause of this but states she is still taking Lasix.  Reassured without chest pain this leaves ACS though she does endorse some orthopnea which is also likely consistent with this.  She is not hypoxic here.  History and exam is otherwise not consistent with PE.  Will start with lab work, chest x-ray, IV diuresis and plan on admission.

## 2024-04-25 NOTE — PATIENT PROFILE ADULT - FALL HARM RISK - HARM RISK INTERVENTIONS

## 2024-04-25 NOTE — H&P ADULT - PROBLEM SELECTOR PLAN 1
Acute diastolic congestive heart failure.   - TTE 1/2024 LVEF 45-50%  - now with LE pitting edema, sob and orthopnea  - gave lasix 40mg IVP in ED  - continue lasix 40mg IVP BID  - f/u TTE  - continue coreg and digoxin  - f/u AM digoxin level  - cardio consulted ([facility]), appreciate recs.

## 2024-04-25 NOTE — H&P ADULT - NSHPREVIEWOFSYSTEMS_GEN_ALL_CORE
REVIEW OF SYSTEMS:    CONSTITUTIONAL:  No weakness  EYES/ENT:  No visual changes;  No vertigo or throat pain   NECK:  No pain or stiffness  RESPIRATORY:  No cough, wheezing, hemoptysis; No shortness of breath  CARDIOVASCULAR: +sob +orthopnea   GASTROINTESTINAL:  No abdominal or epigastric pain. No nausea, vomiting, or hematemesis; No diarrhea or constipation. No melena or hematochezia.  GENITOURINARY:  No dysuria, frequency or hematuria  NEUROLOGICAL:  No numbness or weakness  MSK: + LE swelling  SKIN:  No itching, rashes

## 2024-04-25 NOTE — H&P ADULT - HISTORY OF PRESENT ILLNESS
73 year old female with a past medical history of atrial fibrillation on Xarelto, bradycardia status post pacemaker, hypertension, diabetes, cardiomyopathy, pulmonary hypertension is presenting with lower extremity edema.  States that this similar presentation happened to her previously when she was admitted to the hospital.  She states her LE edema improved after discharge in January but then has been worsening for the past month. She endorse some shortness of breath and difficulty laying flat as well. with her symptoms.  Denies any chest pain.  States that she saw her endocrinologist and was told she had gained 35 pounds in water weight due to the swelling in her legs. She states her legs do feel uncomfortable States that ambulation has been more difficult for her recently due to her symptoms.   She states denies any other complaints. She follows with Cardio Dr. Yvan Sequeira. Last TTE in 1/2024 showed EF 45-50%.     ED course:   Vitals: T 98.5 HR 66 /104   Labs: alk phosph 130 proBNP 1034   Given: 40 mg IV lasix  Imaging:   cxray : mild CHF at this time   LE US: no DVT  73 year old female with a past medical history of atrial fibrillation on Xarelto, bradycardia status post pacemaker, hypertension, diabetes, cardiomyopathy, pulmonary hypertension is presenting with lower extremity edema.  States that this similar presentation happened to her previously when she was admitted to the hospital.  She states her LE edema improved after discharge in January but then has been worsening for the past month. She endorse some shortness of breath and difficulty laying flat as well. with her symptoms.  Denies any chest pain.  States that she saw her endocrinologist and was told she had gained 35 pounds in water weight due to the swelling in her legs. She states her legs do feel uncomfortable States that ambulation has been more difficult for her recently due to her symptoms.   She states denies any other complaints. She follows with Cardio Dr. Yvan Ellis. Last TTE in 1/2024 showed EF 45-50%.     ED course:   Vitals: T 98.5 HR 66 /104   Labs: alk phosph 130 proBNP 1034   Given: 40 mg IV lasix  Imaging:   cxray : mild CHF at this time   LE US: no DVT

## 2024-04-25 NOTE — ED ADULT NURSE NOTE - NSFALLHARMRISKINTERV_ED_ALL_ED

## 2024-04-26 LAB
A1C WITH ESTIMATED AVERAGE GLUCOSE RESULT: 8.3 % — HIGH (ref 4–5.6)
ALBUMIN SERPL ELPH-MCNC: 3.1 G/DL — LOW (ref 3.3–5)
ALP SERPL-CCNC: 116 U/L — SIGNIFICANT CHANGE UP (ref 40–120)
ALT FLD-CCNC: 16 U/L — SIGNIFICANT CHANGE UP (ref 12–78)
ANION GAP SERPL CALC-SCNC: 6 MMOL/L — SIGNIFICANT CHANGE UP (ref 5–17)
AST SERPL-CCNC: 24 U/L — SIGNIFICANT CHANGE UP (ref 15–37)
BILIRUB SERPL-MCNC: 1.1 MG/DL — SIGNIFICANT CHANGE UP (ref 0.2–1.2)
BUN SERPL-MCNC: 15 MG/DL — SIGNIFICANT CHANGE UP (ref 7–23)
CALCIUM SERPL-MCNC: 8.6 MG/DL — SIGNIFICANT CHANGE UP (ref 8.5–10.1)
CHLORIDE SERPL-SCNC: 98 MMOL/L — SIGNIFICANT CHANGE UP (ref 96–108)
CO2 SERPL-SCNC: 34 MMOL/L — HIGH (ref 22–31)
CREAT SERPL-MCNC: 0.81 MG/DL — SIGNIFICANT CHANGE UP (ref 0.5–1.3)
DIGOXIN SERPL-MCNC: 1.5 NG/ML — SIGNIFICANT CHANGE UP (ref 0.8–2)
EGFR: 76 ML/MIN/1.73M2 — SIGNIFICANT CHANGE UP
ESTIMATED AVERAGE GLUCOSE: 192 MG/DL — HIGH (ref 68–114)
GLUCOSE SERPL-MCNC: 264 MG/DL — HIGH (ref 70–99)
HCT VFR BLD CALC: 38.5 % — SIGNIFICANT CHANGE UP (ref 34.5–45)
HGB BLD-MCNC: 12.1 G/DL — SIGNIFICANT CHANGE UP (ref 11.5–15.5)
MCHC RBC-ENTMCNC: 28.7 PG — SIGNIFICANT CHANGE UP (ref 27–34)
MCHC RBC-ENTMCNC: 31.4 GM/DL — LOW (ref 32–36)
MCV RBC AUTO: 91.2 FL — SIGNIFICANT CHANGE UP (ref 80–100)
NRBC # BLD: 0 /100 WBCS — SIGNIFICANT CHANGE UP (ref 0–0)
PLATELET # BLD AUTO: 138 K/UL — LOW (ref 150–400)
POTASSIUM SERPL-MCNC: 4.1 MMOL/L — SIGNIFICANT CHANGE UP (ref 3.5–5.3)
POTASSIUM SERPL-SCNC: 4.1 MMOL/L — SIGNIFICANT CHANGE UP (ref 3.5–5.3)
PROT SERPL-MCNC: 7.3 G/DL — SIGNIFICANT CHANGE UP (ref 6–8.3)
RBC # BLD: 4.22 M/UL — SIGNIFICANT CHANGE UP (ref 3.8–5.2)
RBC # FLD: 15.3 % — HIGH (ref 10.3–14.5)
SODIUM SERPL-SCNC: 138 MMOL/L — SIGNIFICANT CHANGE UP (ref 135–145)
WBC # BLD: 4.28 K/UL — SIGNIFICANT CHANGE UP (ref 3.8–10.5)
WBC # FLD AUTO: 4.28 K/UL — SIGNIFICANT CHANGE UP (ref 3.8–10.5)

## 2024-04-26 PROCEDURE — 99233 SBSQ HOSP IP/OBS HIGH 50: CPT | Mod: GC

## 2024-04-26 PROCEDURE — 99223 1ST HOSP IP/OBS HIGH 75: CPT

## 2024-04-26 RX ORDER — DIPHENHYDRAMINE HCL 50 MG
25 CAPSULE ORAL ONCE
Refills: 0 | Status: COMPLETED | OUTPATIENT
Start: 2024-04-26 | End: 2024-04-26

## 2024-04-26 RX ORDER — INSULIN GLARGINE 100 [IU]/ML
23 INJECTION, SOLUTION SUBCUTANEOUS AT BEDTIME
Refills: 0 | Status: DISCONTINUED | OUTPATIENT
Start: 2024-04-26 | End: 2024-05-01

## 2024-04-26 RX ADMIN — Medication 7 UNIT(S): at 08:47

## 2024-04-26 RX ADMIN — Medication 650 MILLIGRAM(S): at 18:36

## 2024-04-26 RX ADMIN — CARVEDILOL PHOSPHATE 12.5 MILLIGRAM(S): 80 CAPSULE, EXTENDED RELEASE ORAL at 18:03

## 2024-04-26 RX ADMIN — CARVEDILOL PHOSPHATE 12.5 MILLIGRAM(S): 80 CAPSULE, EXTENDED RELEASE ORAL at 05:12

## 2024-04-26 RX ADMIN — Medication 25 MILLIGRAM(S): at 05:11

## 2024-04-26 RX ADMIN — Medication 40 MILLIGRAM(S): at 14:54

## 2024-04-26 RX ADMIN — Medication 4: at 08:48

## 2024-04-26 RX ADMIN — Medication 250 MICROGRAM(S): at 05:11

## 2024-04-26 RX ADMIN — ZOLPIDEM TARTRATE 5 MILLIGRAM(S): 10 TABLET ORAL at 22:01

## 2024-04-26 RX ADMIN — RIVAROXABAN 20 MILLIGRAM(S): KIT at 18:02

## 2024-04-26 RX ADMIN — INSULIN GLARGINE 23 UNIT(S): 100 INJECTION, SOLUTION SUBCUTANEOUS at 21:59

## 2024-04-26 RX ADMIN — Medication 650 MILLIGRAM(S): at 10:38

## 2024-04-26 RX ADMIN — Medication 7 UNIT(S): at 12:46

## 2024-04-26 RX ADMIN — Medication 125 MICROGRAM(S): at 05:11

## 2024-04-26 RX ADMIN — Medication 7 UNIT(S): at 18:01

## 2024-04-26 RX ADMIN — Medication 4: at 12:47

## 2024-04-26 RX ADMIN — Medication 40 MILLIGRAM(S): at 05:11

## 2024-04-26 NOTE — PROGRESS NOTE ADULT - PROBLEM SELECTOR PLAN 1
Acute diastolic congestive heart failure.   - TTE 1/2024 LVEF 45-50%  - now with LE pitting edema, sob and orthopnea  - s/p lasix 40mg IVP in ED  - f/u TTE  - continue home Coreg  - Lasix 40mg IVP BID  - cardio following 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)  - TTE 1/2024 LVEF 45-50%  - now with LE pitting edema, sob and orthopnea  - f/u TTE  - continue home Coreg  - Continue Lasix 40mg IVP BID - consider adding metolazone   - cardio following

## 2024-04-26 NOTE — PROGRESS NOTE ADULT - ASSESSMENT
73 year old female with a past medical history of atrial fibrillation on Xarelto, bradycardia status post pacemaker, hypertension, diabetes, cardiomyopathy, pulmonary hypertension is presenting with lower extremity edema. Admit for CHF exacerbation.  73 year old female with a past medical history of atrial fibrillation on Xarelto, bradycardia status post pacemaker, hypertension, diabetes, cardiomyopathy, pulmonary hypertension is presenting with lower extremity edema. Admit for acute decompensated congestive heart failure with mid-range ejection fraction.

## 2024-04-26 NOTE — PROGRESS NOTE ADULT - SUBJECTIVE AND OBJECTIVE BOX
Patient is a 73y old  Female who presents with a chief complaint of chf exacerbation (26 Apr 2024 08:04)      INTERVAL HPI/OVERNIGHT EVENTS: No acute overnight events. Pt was seen and examined at bedside. Pt states that she had difficulty sleeping overnight and neuropathy pains in both feet b/l. Patient also states she has shortness of breath with exertion and very swollen ankles. Patient with arthritic pain in joints, worst in right shoulder.     Pt denies headache, dizziness, lightheadedness, fever, chills, CP, n/v, numbness/tingling.  No other complaints at this time.     MEDICATIONS  (STANDING):  carvedilol 12.5 milliGRAM(s) Oral every 12 hours  dextrose 10% Bolus 125 milliLiter(s) IV Bolus once  dextrose 5%. 1000 milliLiter(s) (50 mL/Hr) IV Continuous <Continuous>  dextrose 5%. 1000 milliLiter(s) (100 mL/Hr) IV Continuous <Continuous>  dextrose 50% Injectable 25 Gram(s) IV Push once  dextrose 50% Injectable 12.5 Gram(s) IV Push once  digoxin     Tablet 250 MICROGram(s) Oral daily  furosemide   Injectable 40 milliGRAM(s) IV Push two times a day  glucagon  Injectable 1 milliGRAM(s) IntraMuscular once  insulin glargine Injectable (LANTUS) 23 Unit(s) SubCutaneous at bedtime  insulin lispro (ADMELOG) corrective regimen sliding scale   SubCutaneous at bedtime  insulin lispro (ADMELOG) corrective regimen sliding scale   SubCutaneous three times a day before meals  insulin lispro Injectable (ADMELOG) 7 Unit(s) SubCutaneous three times a day before meals  levothyroxine 125 MICROGram(s) Oral daily  rivaroxaban 20 milliGRAM(s) Oral with dinner    MEDICATIONS  (PRN):  acetaminophen     Tablet .. 650 milliGRAM(s) Oral every 6 hours PRN Temp greater or equal to 38C (100.4F), Mild Pain (1 - 3)  aluminum hydroxide/magnesium hydroxide/simethicone Suspension 30 milliLiter(s) Oral every 4 hours PRN Dyspepsia  dextrose Oral Gel 15 Gram(s) Oral once PRN Blood Glucose LESS THAN 70 milliGRAM(s)/deciliter  melatonin 3 milliGRAM(s) Oral at bedtime PRN Insomnia  ondansetron Injectable 4 milliGRAM(s) IV Push every 8 hours PRN Nausea and/or Vomiting  zolpidem 5 milliGRAM(s) Oral at bedtime PRN Insomnia      Allergies    penicillin (Short breath; Rash)  statins (Vomiting)  erythromycin (Rash)  nystatin topical (Hives; Rash)  vancomycin (Short breath; Rash)    Intolerances    REVIEW OF SYSTEMS:  CONSTITUTIONAL: No fever or chills  HEENT:  No headache, no sore throat  RESPIRATORY: + shortness of breath  CARDIOVASCULAR: + leg swelling, No chest pain, palpitations  GASTROINTESTINAL: No abd pain, nausea, vomiting, or diarrhea  GENITOURINARY: No dysuria, frequency, or hematuria  NEUROLOGICAL: no focal weakness or dizziness  MUSCULOSKELETAL: no myalgias       Vital Signs Last 24 Hrs  T(C): 36.6 (26 Apr 2024 11:24), Max: 36.9 (25 Apr 2024 16:38)  T(F): 97.9 (26 Apr 2024 11:24), Max: 98.5 (25 Apr 2024 16:38)  HR: 61 (26 Apr 2024 11:24) (60 - 69)  BP: 159/72 (26 Apr 2024 11:24) (137/62 - 180/73)  BP(mean): --  RR: 18 (26 Apr 2024 11:24) (17 - 18)  SpO2: 92% (26 Apr 2024 11:24) (91% - 96%)    Parameters below as of 26 Apr 2024 11:24  Patient On (Oxygen Delivery Method): room air        PHYSICAL EXAM:  GENERAL: NAD  HEENT:  anicteric, moist mucous membranes  CHEST/LUNG:  decreased bs b/l, b/l crackles  HEART:  RRR, S1, S2  ABDOMEN:  BS+, soft, nontender, nondistended  EXTREMITIES: no edema, cyanosis, or calf tenderness  NERVOUS SYSTEM: answers questions and follows commands appropriately  EXTREMITIES: LE edema bilaterally, legs wrapped in bandage    LABS:                        12.1   4.28  )-----------( 138      ( 26 Apr 2024 07:55 )             38.5     CBC Full  -  ( 26 Apr 2024 07:55 )  WBC Count : 4.28 K/uL  Hemoglobin : 12.1 g/dL  Hematocrit : 38.5 %  Platelet Count - Automated : 138 K/uL  Mean Cell Volume : 91.2 fl  Mean Cell Hemoglobin : 28.7 pg  Mean Cell Hemoglobin Concentration : 31.4 gm/dL  Auto Neutrophil # : x  Auto Lymphocyte # : x  Auto Monocyte # : x  Auto Eosinophil # : x  Auto Basophil # : x  Auto Neutrophil % : x  Auto Lymphocyte % : x  Auto Monocyte % : x  Auto Eosinophil % : x  Auto Basophil % : x    26 Apr 2024 07:55    138    |  98     |  15     ----------------------------<  264    4.1     |  34     |  0.81     Ca    8.6        26 Apr 2024 07:55  Mg     2.1       25 Apr 2024 19:16    TPro  7.3    /  Alb  3.1    /  TBili  1.1    /  DBili  x      /  AST  24     /  ALT  16     /  AlkPhos  116    26 Apr 2024 07:55    PT/INR - ( 25 Apr 2024 19:16 )   PT: 16.9 sec;   INR: 1.46 ratio         PTT - ( 25 Apr 2024 19:16 )  PTT:34.4 sec  Urinalysis Basic - ( 26 Apr 2024 07:55 )    Color: x / Appearance: x / SG: x / pH: x  Gluc: 264 mg/dL / Ketone: x  / Bili: x / Urobili: x   Blood: x / Protein: x / Nitrite: x   Leuk Esterase: x / RBC: x / WBC x   Sq Epi: x / Non Sq Epi: x / Bacteria: x      CAPILLARY BLOOD GLUCOSE      POCT Blood Glucose.: 214 mg/dL (26 Apr 2024 12:00)  POCT Blood Glucose.: 241 mg/dL (26 Apr 2024 08:00)  POCT Blood Glucose.: 229 mg/dL (26 Apr 2024 03:49)  POCT Blood Glucose.: 168 mg/dL (25 Apr 2024 22:48)  POCT Blood Glucose.: 157 mg/dL (25 Apr 2024 19:52)          RADIOLOGY & ADDITIONAL TESTS: ____    Personally reviewed.     Consultant(s) Notes Reviewed:  [x] YES  [ ] NO     Patient is a 73y old  Female who presents with a chief complaint of chf exacerbation (26 Apr 2024 08:04)    INTERVAL HPI/OVERNIGHT EVENTS: No acute overnight events. Pt was seen and examined at bedside. Pt states that she had difficulty sleeping overnight and neuropathy pains in both feet b/l. Patient also states she has shortness of breath with exertion and very swollen ankles. Patient with arthritic pain in joints, worst in right shoulder. States Synjardy ruined her as she got every possible side effect & that swelling started shortly after stopping this. Gained >30lb water weight. Sees Dr. Dumont from wound care - states that he wanted her to elevate her legs but she couldn't do it.    Pt denies headache, dizziness, lightheadedness, fever, chills, CP, n/v, numbness/tingling.  No other complaints at this time.     MEDICATIONS  (STANDING):  carvedilol 12.5 milliGRAM(s) Oral every 12 hours  dextrose 10% Bolus 125 milliLiter(s) IV Bolus once  dextrose 5%. 1000 milliLiter(s) (50 mL/Hr) IV Continuous <Continuous>  dextrose 5%. 1000 milliLiter(s) (100 mL/Hr) IV Continuous <Continuous>  dextrose 50% Injectable 25 Gram(s) IV Push once  dextrose 50% Injectable 12.5 Gram(s) IV Push once  digoxin     Tablet 250 MICROGram(s) Oral daily  furosemide   Injectable 40 milliGRAM(s) IV Push two times a day  glucagon  Injectable 1 milliGRAM(s) IntraMuscular once  insulin glargine Injectable (LANTUS) 23 Unit(s) SubCutaneous at bedtime  insulin lispro (ADMELOG) corrective regimen sliding scale   SubCutaneous at bedtime  insulin lispro (ADMELOG) corrective regimen sliding scale   SubCutaneous three times a day before meals  insulin lispro Injectable (ADMELOG) 7 Unit(s) SubCutaneous three times a day before meals  levothyroxine 125 MICROGram(s) Oral daily  rivaroxaban 20 milliGRAM(s) Oral with dinner    MEDICATIONS  (PRN):  acetaminophen     Tablet .. 650 milliGRAM(s) Oral every 6 hours PRN Temp greater or equal to 38C (100.4F), Mild Pain (1 - 3)  aluminum hydroxide/magnesium hydroxide/simethicone Suspension 30 milliLiter(s) Oral every 4 hours PRN Dyspepsia  dextrose Oral Gel 15 Gram(s) Oral once PRN Blood Glucose LESS THAN 70 milliGRAM(s)/deciliter  melatonin 3 milliGRAM(s) Oral at bedtime PRN Insomnia  ondansetron Injectable 4 milliGRAM(s) IV Push every 8 hours PRN Nausea and/or Vomiting  zolpidem 5 milliGRAM(s) Oral at bedtime PRN Insomnia      Allergies    penicillin (Short breath; Rash)  statins (Vomiting)  erythromycin (Rash)  nystatin topical (Hives; Rash)  vancomycin (Short breath; Rash)    Intolerances    REVIEW OF SYSTEMS:  CONSTITUTIONAL: No fever or chills  HEENT:  No headache, no sore throat  RESPIRATORY: + shortness of breath  CARDIOVASCULAR: + leg swelling, No chest pain, palpitations  GASTROINTESTINAL: No abd pain, nausea, vomiting, or diarrhea  GENITOURINARY: No dysuria, frequency, or hematuria  NEUROLOGICAL: no focal weakness or dizziness  MUSCULOSKELETAL: no myalgias       Vital Signs Last 24 Hrs  T(C): 36.6 (26 Apr 2024 11:24), Max: 36.9 (25 Apr 2024 16:38)  T(F): 97.9 (26 Apr 2024 11:24), Max: 98.5 (25 Apr 2024 16:38)  HR: 61 (26 Apr 2024 11:24) (60 - 69)  BP: 159/72 (26 Apr 2024 11:24) (137/62 - 180/73)  BP(mean): --  RR: 18 (26 Apr 2024 11:24) (17 - 18)  SpO2: 92% (26 Apr 2024 11:24) (91% - 96%)    Parameters below as of 26 Apr 2024 11:24  Patient On (Oxygen Delivery Method): room air        PHYSICAL EXAM:  GENERAL: NAD  HEENT:  anicteric, moist mucous membranes  CHEST/LUNG:  decreased bs b/l, b/l crackles  HEART:  RRR, S1, S2  ABDOMEN:  BS+, soft, nontender, nondistended  EXTREMITIES: no edema, cyanosis, or calf tenderness  NERVOUS SYSTEM: answers questions and follows commands appropriately  EXTREMITIES: LE edema bilaterally, legs wrapped in bandage    LABS:                        12.1   4.28  )-----------( 138      ( 26 Apr 2024 07:55 )             38.5     CBC Full  -  ( 26 Apr 2024 07:55 )  WBC Count : 4.28 K/uL  Hemoglobin : 12.1 g/dL  Hematocrit : 38.5 %  Platelet Count - Automated : 138 K/uL  Mean Cell Volume : 91.2 fl  Mean Cell Hemoglobin : 28.7 pg  Mean Cell Hemoglobin Concentration : 31.4 gm/dL  Auto Neutrophil # : x  Auto Lymphocyte # : x  Auto Monocyte # : x  Auto Eosinophil # : x  Auto Basophil # : x  Auto Neutrophil % : x  Auto Lymphocyte % : x  Auto Monocyte % : x  Auto Eosinophil % : x  Auto Basophil % : x    26 Apr 2024 07:55    138    |  98     |  15     ----------------------------<  264    4.1     |  34     |  0.81     Ca    8.6        26 Apr 2024 07:55  Mg     2.1       25 Apr 2024 19:16    TPro  7.3    /  Alb  3.1    /  TBili  1.1    /  DBili  x      /  AST  24     /  ALT  16     /  AlkPhos  116    26 Apr 2024 07:55    PT/INR - ( 25 Apr 2024 19:16 )   PT: 16.9 sec;   INR: 1.46 ratio         PTT - ( 25 Apr 2024 19:16 )  PTT:34.4 sec  Urinalysis Basic - ( 26 Apr 2024 07:55 )    Color: x / Appearance: x / SG: x / pH: x  Gluc: 264 mg/dL / Ketone: x  / Bili: x / Urobili: x   Blood: x / Protein: x / Nitrite: x   Leuk Esterase: x / RBC: x / WBC x   Sq Epi: x / Non Sq Epi: x / Bacteria: x      CAPILLARY BLOOD GLUCOSE      POCT Blood Glucose.: 214 mg/dL (26 Apr 2024 12:00)  POCT Blood Glucose.: 241 mg/dL (26 Apr 2024 08:00)  POCT Blood Glucose.: 229 mg/dL (26 Apr 2024 03:49)  POCT Blood Glucose.: 168 mg/dL (25 Apr 2024 22:48)  POCT Blood Glucose.: 157 mg/dL (25 Apr 2024 19:52)          RADIOLOGY & ADDITIONAL TESTS: ____    Personally reviewed.     Consultant(s) Notes Reviewed:  [x] YES  [ ] NO

## 2024-04-26 NOTE — PROGRESS NOTE ADULT - PROBLEM SELECTOR PLAN 2
atrial fibrillation on xarelto, atrial flutter s/p ablation, bradycardia and syncope s/p ppm implant,   - continue home xarelto  - continue digoxin, (dig level WNL)  - remote tele atrial fibrillation on xarelto, atrial flutter s/p ablation, bradycardia and syncope s/p ppm implant  - continue home xarelto  - continue digoxin, (dig level WNL)  - remote tele

## 2024-04-26 NOTE — CONSULT NOTE ADULT - ASSESSMENT
- Patient is not complaining of any cardiac symptoms at this time.  - No clear evidence of acute ischemia, trops negative x 2. Will follow up third set.  - His CKs are flat, suggesting against acute atherosclerotic plaque rupture.  - Biomarker trend is not consistent with plaque rupture but rather demand ischemia. Monitor closely for the development of anginal symptoms or clinical signs of ischemia.   - No acute changes on EKG compared to previous.  - No meaningful evidence of volume overload.  - Previous TTE shows ___.  - BP well controlled, monitor routine hemodynamics.  - Continue ___.  - Monitor and replete lytes, keep K>4, Mg>2.  - Strict I/Os, daily weights.  - Pt has no active ischemia, decompensated heart failure, unstable arrythmia, or severe stenotic valvular disease, and has __ cardiac risk factors. In the setting of low risk ___, he/she is optimized from cardiovascular standpoint to proceed with planned procedure with routine hemodynamic monitoring.   - Pt has no modifiable active cardiac risk factors and in the setting of low risk _____, patient is optimized as best as possible from cardiovascular standpoint to proceed with planned procedure with routine hemodynamic monitoring.  - Other cardiovascular workup will depend on clinical course.  - All other workup per primary team.  - Will continue to follow.             73F PMH HFrEF, atrial fibrillation and atrial flutter s/p ablation, HTN, DM, non ischemic cardiomyopathy with moderate LV dysfunction, pulmonary hypertension, depression, bradycardia and conduction disease s/p Medtronic dual chamber pacemaker implant, edema, hypothyroidism, hyperparathyroidism, presents with shortness of breath, and LE edema. Cardiology called for SOB. Dr. Ellis: Cardio.     A fib/Flutter s/p ablation, HTN, Non-Ischemic Cardiomyopathy, HFrEF (mod LVSD) , Pulm HTN, PPM (dual chamber medtronic)    - EKG shows SR w/ 1st degree AV block, RBBB, LVH HR 61  - Patient presented with SOB, orthopnea, LE edema, gained ~35 pounds of water weight  - Continue with IV lasix 40mg BID   - Echo: < from: TTE W or WO Ultrasound Enhancing Agent (01.22.24 @ 13:57) >  Left ventricular systolic function is mildly decreased with an ejection fraction visually estimated at 45 to 50 %. Global left ventricular hypokinesis. Moderate TR, Severe Pulm HTN  - Repeat TTE ordered, f/u results  - No clear evidence of acute ischemia, trops negative x 1.   - C/w digoxin 250mcg daily , Xarelto 20mg po daily  - F/u dig level  - BP stable, monitor routine hemodynamics.  - Continue coreg 12.5mg BID  - Was previously on Losartan 75mg daily, has not been taking for few months now, unclear why  - Monitor and replete lytes, keep K>4, Mg>2.  - Strict I/Os, daily weights.  - Other cardiovascular workup will depend on clinical course.  - All other workup per primary team.  - Will continue to follow.

## 2024-04-26 NOTE — CONSULT NOTE ADULT - NS ATTEND AMEND GEN_ALL_CORE FT
73F PMH HFrEF, atrial fibrillation and atrial flutter s/p ablation, HTN, DM, non ischemic cardiomyopathy with moderate LV dysfunction, pulmonary hypertension, depression, bradycardia and conduction disease s/p Medtronic dual chamber pacemaker implant, edema, hypothyroidism, hyperparathyroidism, presents with shortness of breath, and LE edema. Cardiology called for SOB. Dr. Ellis: Cardio.       - Patient presented with SOB, orthopnea, LE edema, gained ~35 pounds of water weight  - Continue with IV lasix 40mg BID   - Repeat TTE ordered, f/u results  - C/w digoxin 250mcg daily , Xarelto 20mg po daily  - F/u dig level  - Continue coreg 12.5mg BID  - Was previously on Losartan 75mg daily, has not been taking for few months now, unclear why  - Strict I/Os, daily weights.

## 2024-04-26 NOTE — CONSULT NOTE ADULT - SUBJECTIVE AND OBJECTIVE BOX
*********CHARTING IN PROGRESS***********      Patient is a 73y old  Female who presents with a chief complaint of chf exacerbation (25 Apr 2024 21:47)      HPI:  73 year old female with a past medical history of atrial fibrillation on Xarelto, bradycardia status post pacemaker, hypertension, diabetes, cardiomyopathy, pulmonary hypertension is presenting with lower extremity edema.  States that this similar presentation happened to her previously when she was admitted to the hospital.  She states her LE edema improved after discharge in January but then has been worsening for the past month. She endorse some shortness of breath and difficulty laying flat as well. with her symptoms.  Denies any chest pain.  States that she saw her endocrinologist and was told she had gained 35 pounds in water weight due to the swelling in her legs. She states her legs do feel uncomfortable States that ambulation has been more difficult for her recently due to her symptoms.   She states denies any other complaints. She follows with Cardio Dr. Yvan Ellis. Last TTE in 1/2024 showed EF 45-50%.     ED course:   Vitals: T 98.5 HR 66 /104   Labs: alk phosph 130 proBNP 1034   Given: 40 mg IV lasix  Imaging:   cxray : mild CHF at this time   LE US: no DVT  (25 Apr 2024 21:47)    INTERVAL HPI: Patient seen and examined at bedside. States shortness of breath has improved since arrival, still having orthopnea.         PAST MEDICAL & SURGICAL HISTORY:  Hypothyroid      DM (diabetes mellitus)      HTN (hypertension)      Atrial fibrillation and flutter  s/p ablation      Bradycardia  s/p MDT PPM      H/O pulmonary hypertension      H/O hyperparathyroidism      H/O cardiomyopathy      Cardiac pacemaker      H/O cardiac radiofrequency ablation      H/O carpal tunnel repair      History of parathyroid surgery                ECHO  FINDINGS:      MEDICATIONS  (STANDING):  carvedilol 12.5 milliGRAM(s) Oral every 12 hours  dextrose 10% Bolus 125 milliLiter(s) IV Bolus once  dextrose 5%. 1000 milliLiter(s) (50 mL/Hr) IV Continuous <Continuous>  dextrose 5%. 1000 milliLiter(s) (100 mL/Hr) IV Continuous <Continuous>  dextrose 50% Injectable 25 Gram(s) IV Push once  dextrose 50% Injectable 12.5 Gram(s) IV Push once  digoxin     Tablet 250 MICROGram(s) Oral daily  furosemide   Injectable 40 milliGRAM(s) IV Push two times a day  glucagon  Injectable 1 milliGRAM(s) IntraMuscular once  insulin glargine Injectable (LANTUS) 23 Unit(s) SubCutaneous at bedtime  insulin lispro (ADMELOG) corrective regimen sliding scale   SubCutaneous three times a day before meals  insulin lispro (ADMELOG) corrective regimen sliding scale   SubCutaneous at bedtime  insulin lispro Injectable (ADMELOG) 7 Unit(s) SubCutaneous three times a day before meals  levothyroxine 125 MICROGram(s) Oral daily  rivaroxaban 20 milliGRAM(s) Oral with dinner    MEDICATIONS  (PRN):  acetaminophen     Tablet .. 650 milliGRAM(s) Oral every 6 hours PRN Temp greater or equal to 38C (100.4F), Mild Pain (1 - 3)  aluminum hydroxide/magnesium hydroxide/simethicone Suspension 30 milliLiter(s) Oral every 4 hours PRN Dyspepsia  dextrose Oral Gel 15 Gram(s) Oral once PRN Blood Glucose LESS THAN 70 milliGRAM(s)/deciliter  melatonin 3 milliGRAM(s) Oral at bedtime PRN Insomnia  ondansetron Injectable 4 milliGRAM(s) IV Push every 8 hours PRN Nausea and/or Vomiting  zolpidem 5 milliGRAM(s) Oral at bedtime PRN Insomnia      FAMILY HISTORY:  No pertinent family history in first degree relatives      Denies Family history of CAD or early MI      Constitutional: denies fever, chills  HEENT: denies blurry vision, difficulty hearing  Respiratory: denies SOB, SALCIDO, cough  Cardiovascular: denies CP, palpitations, orthopnea, PND, LE edema  Gastrointestinal: denies nausea, vomiting, abdominal pain  Genitourinary: denies urinary changes  Skin: Denies rashes, itching  Neurologic: denies headache, weakness, dizziness  Hematology/Oncology: denies bleeding, easy bruising  ROS negative except as noted above      SOCIAL HISTORY:    No tobacco, Alcohol or Drug use    Vital Signs Last 24 Hrs  T(C): 36.3 (26 Apr 2024 04:15), Max: 36.9 (25 Apr 2024 16:38)  T(F): 97.3 (26 Apr 2024 04:15), Max: 98.5 (25 Apr 2024 16:38)  HR: 60 (26 Apr 2024 04:15) (60 - 69)  BP: 157/77 (26 Apr 2024 04:15) (137/62 - 180/73)  BP(mean): --  RR: 18 (26 Apr 2024 04:15) (17 - 18)  SpO2: 91% (26 Apr 2024 04:15) (91% - 96%)    Parameters below as of 26 Apr 2024 04:15  Patient On (Oxygen Delivery Method): room air        Physical Exam:  General: Well developed, well nourished, NAD  HEENT: NCAT, PERRLA, EOMI bl, moist mucous membranes   Neck: Supple, nontender, no mass  Neurology: A&Ox3, nonfocal, sensation intact   Respiratory: CTA B/L, No W/R/R  CV: RRR, +S1/S2, no murmurs, rubs or gallops  Abdominal: Soft, NT, ND +BSx4, no palpable masses  Extremities: No C/C/E, + peripheral pulses  MSK: Normal ROM, no joint erythema or warmth, no joint swelling   Heme: No obvious ecchymosis or petechiae   Skin: warm, dry, normal color      ECG:    I&O's Detail    25 Apr 2024 07:01  -  26 Apr 2024 07:00  --------------------------------------------------------  IN:  Total IN: 0 mL    OUT:    Voided (mL): 1850 mL  Total OUT: 1850 mL    Total NET: -1850 mL          LABS:                        12.5   4.58  )-----------( 156      ( 25 Apr 2024 19:16 )             41.7     04-25    140  |  101  |  17  ----------------------------<  179<H>  4.1   |  34<H>  |  0.77    Ca    8.3<L>      25 Apr 2024 19:16  Mg     2.1     04-25    TPro  7.4  /  Alb  3.2<L>  /  TBili  0.7  /  DBili  x   /  AST  25  /  ALT  17  /  AlkPhos  130<H>  04-25        PT/INR - ( 25 Apr 2024 19:16 )   PT: 16.9 sec;   INR: 1.46 ratio         PTT - ( 25 Apr 2024 19:16 )  PTT:34.4 sec  Urinalysis Basic - ( 25 Apr 2024 19:16 )    Color: x / Appearance: x / SG: x / pH: x  Gluc: 179 mg/dL / Ketone: x  / Bili: x / Urobili: x   Blood: x / Protein: x / Nitrite: x   Leuk Esterase: x / RBC: x / WBC x   Sq Epi: x / Non Sq Epi: x / Bacteria: x      I&O's Summary    25 Apr 2024 07:01  -  26 Apr 2024 07:00  --------------------------------------------------------  IN: 0 mL / OUT: 1850 mL / NET: -1850 mL      BNP  RADIOLOGY & ADDITIONAL STUDIES: Patient is a 73y old  Female who presents with a chief complaint of chf exacerbation (25 Apr 2024 21:47)      HPI:  73 year old female with a past medical history of atrial fibrillation on Xarelto, bradycardia status post pacemaker, hypertension, diabetes, cardiomyopathy, pulmonary hypertension is presenting with lower extremity edema.  States that this similar presentation happened to her previously when she was admitted to the hospital.  She states her LE edema improved after discharge in January but then has been worsening for the past month. She endorse some shortness of breath and difficulty laying flat as well. with her symptoms.  Denies any chest pain.  States that she saw her endocrinologist and was told she had gained 35 pounds in water weight due to the swelling in her legs. She states her legs do feel uncomfortable States that ambulation has been more difficult for her recently due to her symptoms.   She states denies any other complaints. She follows with Cardio Dr. Yvan Ellis. Last TTE in 1/2024 showed EF 45-50%.     ED course:   Vitals: T 98.5 HR 66 /104   Labs: alk phosph 130 proBNP 1034   Given: 40 mg IV lasix  Imaging:   cxray : mild CHF at this time   LE US: no DVT  (25 Apr 2024 21:47)    INTERVAL HPI: Patient seen and examined at bedside. States shortness of breath has improved since arrival, still having orthopnea.   States she was started on a new medication synjardy by her old endocrinologist (Dr. Perlman) around 3 weeks ago and she states her LE edema has gotten even worse since then.   Medication has now been discontinued.   Denies chest pain, palpitations.         PAST MEDICAL & SURGICAL HISTORY:  Hypothyroid      DM (diabetes mellitus)      HTN (hypertension)      Atrial fibrillation and flutter  s/p ablation      Bradycardia  s/p MDT PPM      H/O pulmonary hypertension      H/O hyperparathyroidism      H/O cardiomyopathy      Cardiac pacemaker      H/O cardiac radiofrequency ablation      H/O carpal tunnel repair      History of parathyroid surgery                ECHO  FINDINGS: < from: TTE W or WO Ultrasound Enhancing Agent (01.22.24 @ 13:57) >  CONCLUSIONS:      1. Technically difficult image quality.   2. Left ventricular systolic function is mildly decreased with an ejection fraction visually estimated at 45 to 50 %. Global left ventricular hypokinesis.   3. Based on visualassessment, the right ventricle appears mildly enlarged. borderline reduced systolic function.   4. Device lead is visualized in the right heart.   5. The left atrium is moderately dilated.   6. The right atrium is dilated in size.   7. Symmetric mitral valve leaflet tethering.   8. Mild mitral regurgitation.   9. Trileaflet aortic valve with reduced systolic excursion. There is calcification of the aortic valve leaflets. moderate aortic stenosis.  10. The DEVYN is estimated at 1.1sqcm.  11. Moderate to severe tricuspid regurgitation.  12. Estimated pulmonary artery systolic pressure is 78 mmHg, consistent with severe pulmonary hypertension.  13. The inferior vena cava is dilated measuring 2.67 cm in diameter, (dilated >2.1cm) with abnormal inspiratory collapse (abnormal <50%) consistent with elevated right atrial pressure (~15, range 10-20mmHg).    < end of copied text >        MEDICATIONS  (STANDING):  carvedilol 12.5 milliGRAM(s) Oral every 12 hours  dextrose 10% Bolus 125 milliLiter(s) IV Bolus once  dextrose 5%. 1000 milliLiter(s) (50 mL/Hr) IV Continuous <Continuous>  dextrose 5%. 1000 milliLiter(s) (100 mL/Hr) IV Continuous <Continuous>  dextrose 50% Injectable 25 Gram(s) IV Push once  dextrose 50% Injectable 12.5 Gram(s) IV Push once  digoxin     Tablet 250 MICROGram(s) Oral daily  furosemide   Injectable 40 milliGRAM(s) IV Push two times a day  glucagon  Injectable 1 milliGRAM(s) IntraMuscular once  insulin glargine Injectable (LANTUS) 23 Unit(s) SubCutaneous at bedtime  insulin lispro (ADMELOG) corrective regimen sliding scale   SubCutaneous three times a day before meals  insulin lispro (ADMELOG) corrective regimen sliding scale   SubCutaneous at bedtime  insulin lispro Injectable (ADMELOG) 7 Unit(s) SubCutaneous three times a day before meals  levothyroxine 125 MICROGram(s) Oral daily  rivaroxaban 20 milliGRAM(s) Oral with dinner    MEDICATIONS  (PRN):  acetaminophen     Tablet .. 650 milliGRAM(s) Oral every 6 hours PRN Temp greater or equal to 38C (100.4F), Mild Pain (1 - 3)  aluminum hydroxide/magnesium hydroxide/simethicone Suspension 30 milliLiter(s) Oral every 4 hours PRN Dyspepsia  dextrose Oral Gel 15 Gram(s) Oral once PRN Blood Glucose LESS THAN 70 milliGRAM(s)/deciliter  melatonin 3 milliGRAM(s) Oral at bedtime PRN Insomnia  ondansetron Injectable 4 milliGRAM(s) IV Push every 8 hours PRN Nausea and/or Vomiting  zolpidem 5 milliGRAM(s) Oral at bedtime PRN Insomnia      FAMILY HISTORY:  Denies Family history of CAD or early MI      Constitutional: denies fever, chills  HEENT: denies blurry vision, difficulty hearing  Respiratory: +SALCIDO, denies SOB at rest  Cardiovascular: +orthopnea, LE edema denies CP, palpitations  Gastrointestinal: denies nausea, vomiting, abdominal pain  Genitourinary: +increased frequency 2/2 lasix use  Skin: +LE skin changes (chronic)  Neurologic: denies headache, weakness, dizziness  Hematology/Oncology: denies bleeding, easy bruising  ROS negative except as noted above      SOCIAL HISTORY:    No tobacco, Alcohol or Drug use    Vital Signs Last 24 Hrs  T(C): 36.3 (26 Apr 2024 04:15), Max: 36.9 (25 Apr 2024 16:38)  T(F): 97.3 (26 Apr 2024 04:15), Max: 98.5 (25 Apr 2024 16:38)  HR: 60 (26 Apr 2024 04:15) (60 - 69)  BP: 157/77 (26 Apr 2024 04:15) (137/62 - 180/73)  BP(mean): --  RR: 18 (26 Apr 2024 04:15) (17 - 18)  SpO2: 91% (26 Apr 2024 04:15) (91% - 96%)    Parameters below as of 26 Apr 2024 04:15  Patient On (Oxygen Delivery Method): room air      TELE: no events reported    Physical Exam:  Constitutional: NAD, awake and alert, Obese   HEENT: Moist Mucous Membranes, Anicteric  Pulmonary: crackles at bases bilaterally   Cardiovascular: Regular, S1 and S2, + murmurs, No rubs, gallops or clicks  Gastrointestinal:  soft, nontender, nondistended   Ext: LE edema bilaterally, legs wrapped in bandage  Skin: warm and dry   Psych: Mood & affect appropriate        ECG:   < from: 12 Lead ECG (04.25.24 @ 16:56) >    Ventricular Rate 61 BPM    Atrial Rate 61 BPM    P-R Interval 432 ms    QRS Duration 174 ms    Q-T Interval 450 ms    QTC Calculation(Bazett) 453 ms    R Axis -50 degrees    T Axis 126 degrees    Diagnosis Line Sinus rhythm with 1st degree AV block  atrial-paced complexes  Right bundle branch block  Left anterior fascicular block  *** Bifascicular block ***  Left ventricular hypertrophy with repolarization abnormality ( R in aVL )    < end of copied text >          I&O's Detail    25 Apr 2024 07:01  -  26 Apr 2024 07:00  --------------------------------------------------------  IN:  Total IN: 0 mL    OUT:    Voided (mL): 1850 mL  Total OUT: 1850 mL    Total NET: -1850 mL          LABS:                        12.5   4.58  )-----------( 156      ( 25 Apr 2024 19:16 )             41.7     04-25    140  |  101  |  17  ----------------------------<  179<H>  4.1   |  34<H>  |  0.77    Ca    8.3<L>      25 Apr 2024 19:16  Mg     2.1     04-25    TPro  7.4  /  Alb  3.2<L>  /  TBili  0.7  /  DBili  x   /  AST  25  /  ALT  17  /  AlkPhos  130<H>  04-25        PT/INR - ( 25 Apr 2024 19:16 )   PT: 16.9 sec;   INR: 1.46 ratio         PTT - ( 25 Apr 2024 19:16 )  PTT:34.4 sec  Urinalysis Basic - ( 25 Apr 2024 19:16 )    Color: x / Appearance: x / SG: x / pH: x  Gluc: 179 mg/dL / Ketone: x  / Bili: x / Urobili: x   Blood: x / Protein: x / Nitrite: x   Leuk Esterase: x / RBC: x / WBC x   Sq Epi: x / Non Sq Epi: x / Bacteria: x      I&O's Summary    25 Apr 2024 07:01  -  26 Apr 2024 07:00  --------------------------------------------------------  IN: 0 mL / OUT: 1850 mL / NET: -1850 mL      BNP Pro-Brain Natriuretic Peptide: 1034 pg/mL (04.25.24 @ 19:16)      RADIOLOGY & ADDITIONAL STUDIES: CXR: mild chf (f/u official read)

## 2024-04-27 DIAGNOSIS — F41.9 ANXIETY DISORDER, UNSPECIFIED: ICD-10-CM

## 2024-04-27 LAB
ANION GAP SERPL CALC-SCNC: 8 MMOL/L — SIGNIFICANT CHANGE UP (ref 5–17)
BUN SERPL-MCNC: 15 MG/DL — SIGNIFICANT CHANGE UP (ref 7–23)
CALCIUM SERPL-MCNC: 8.6 MG/DL — SIGNIFICANT CHANGE UP (ref 8.5–10.1)
CHLORIDE SERPL-SCNC: 96 MMOL/L — SIGNIFICANT CHANGE UP (ref 96–108)
CO2 SERPL-SCNC: 36 MMOL/L — HIGH (ref 22–31)
CREAT SERPL-MCNC: 0.81 MG/DL — SIGNIFICANT CHANGE UP (ref 0.5–1.3)
EGFR: 76 ML/MIN/1.73M2 — SIGNIFICANT CHANGE UP
GLUCOSE SERPL-MCNC: 173 MG/DL — HIGH (ref 70–99)
HCT VFR BLD CALC: 39.5 % — SIGNIFICANT CHANGE UP (ref 34.5–45)
HGB BLD-MCNC: 12.2 G/DL — SIGNIFICANT CHANGE UP (ref 11.5–15.5)
MCHC RBC-ENTMCNC: 28.3 PG — SIGNIFICANT CHANGE UP (ref 27–34)
MCHC RBC-ENTMCNC: 30.9 GM/DL — LOW (ref 32–36)
MCV RBC AUTO: 91.6 FL — SIGNIFICANT CHANGE UP (ref 80–100)
NRBC # BLD: 0 /100 WBCS — SIGNIFICANT CHANGE UP (ref 0–0)
PLATELET # BLD AUTO: 141 K/UL — LOW (ref 150–400)
POTASSIUM SERPL-MCNC: 3.5 MMOL/L — SIGNIFICANT CHANGE UP (ref 3.5–5.3)
POTASSIUM SERPL-SCNC: 3.5 MMOL/L — SIGNIFICANT CHANGE UP (ref 3.5–5.3)
RBC # BLD: 4.31 M/UL — SIGNIFICANT CHANGE UP (ref 3.8–5.2)
RBC # FLD: 15 % — HIGH (ref 10.3–14.5)
SODIUM SERPL-SCNC: 140 MMOL/L — SIGNIFICANT CHANGE UP (ref 135–145)
WBC # BLD: 4.2 K/UL — SIGNIFICANT CHANGE UP (ref 3.8–10.5)
WBC # FLD AUTO: 4.2 K/UL — SIGNIFICANT CHANGE UP (ref 3.8–10.5)

## 2024-04-27 PROCEDURE — 99233 SBSQ HOSP IP/OBS HIGH 50: CPT | Mod: GC

## 2024-04-27 PROCEDURE — 99233 SBSQ HOSP IP/OBS HIGH 50: CPT

## 2024-04-27 RX ORDER — LOSARTAN POTASSIUM 100 MG/1
25 TABLET, FILM COATED ORAL DAILY
Refills: 0 | Status: DISCONTINUED | OUTPATIENT
Start: 2024-04-27 | End: 2024-04-28

## 2024-04-27 RX ORDER — POTASSIUM CHLORIDE 20 MEQ
20 PACKET (EA) ORAL
Refills: 0 | Status: DISCONTINUED | OUTPATIENT
Start: 2024-04-27 | End: 2024-04-28

## 2024-04-27 RX ORDER — ALPRAZOLAM 0.25 MG
0.25 TABLET ORAL DAILY
Refills: 0 | Status: DISCONTINUED | OUTPATIENT
Start: 2024-04-27 | End: 2024-05-02

## 2024-04-27 RX ADMIN — Medication 650 MILLIGRAM(S): at 06:03

## 2024-04-27 RX ADMIN — INSULIN GLARGINE 23 UNIT(S): 100 INJECTION, SOLUTION SUBCUTANEOUS at 21:55

## 2024-04-27 RX ADMIN — Medication 40 MILLIGRAM(S): at 13:07

## 2024-04-27 RX ADMIN — Medication 650 MILLIGRAM(S): at 19:26

## 2024-04-27 RX ADMIN — Medication 250 MICROGRAM(S): at 06:04

## 2024-04-27 RX ADMIN — Medication 40 MILLIGRAM(S): at 06:03

## 2024-04-27 RX ADMIN — Medication 0.25 MILLIGRAM(S): at 11:38

## 2024-04-27 RX ADMIN — Medication 2: at 07:56

## 2024-04-27 RX ADMIN — Medication 650 MILLIGRAM(S): at 20:20

## 2024-04-27 RX ADMIN — CARVEDILOL PHOSPHATE 12.5 MILLIGRAM(S): 80 CAPSULE, EXTENDED RELEASE ORAL at 17:01

## 2024-04-27 RX ADMIN — Medication 125 MICROGRAM(S): at 06:03

## 2024-04-27 RX ADMIN — LOSARTAN POTASSIUM 25 MILLIGRAM(S): 100 TABLET, FILM COATED ORAL at 12:35

## 2024-04-27 RX ADMIN — RIVAROXABAN 20 MILLIGRAM(S): KIT at 17:01

## 2024-04-27 RX ADMIN — Medication 7 UNIT(S): at 17:01

## 2024-04-27 RX ADMIN — ZOLPIDEM TARTRATE 5 MILLIGRAM(S): 10 TABLET ORAL at 21:56

## 2024-04-27 RX ADMIN — Medication 4: at 11:39

## 2024-04-27 RX ADMIN — Medication 7 UNIT(S): at 11:38

## 2024-04-27 RX ADMIN — Medication 20 MILLIEQUIVALENT(S): at 17:01

## 2024-04-27 RX ADMIN — Medication 650 MILLIGRAM(S): at 07:03

## 2024-04-27 RX ADMIN — CARVEDILOL PHOSPHATE 12.5 MILLIGRAM(S): 80 CAPSULE, EXTENDED RELEASE ORAL at 06:04

## 2024-04-27 RX ADMIN — Medication 7 UNIT(S): at 07:56

## 2024-04-27 RX ADMIN — Medication 4: at 17:01

## 2024-04-27 NOTE — PROGRESS NOTE ADULT - SUBJECTIVE AND OBJECTIVE BOX
Patient is a 73y old  Female who presents with a chief complaint of chf exacerbation (27 Apr 2024 08:41)        INTERVAL HPI/OVERNIGHT EVENTS: No acute overnight events. Pt seen and examined at the bedside this AM. Pt feels anxious this morning but otherwise no acute complaints. Reports improvement in her breathing. Tolerating RA well. Denies cp, sob, abdominal pain, n/v.      MEDICATIONS  (STANDING):  carvedilol 12.5 milliGRAM(s) Oral every 12 hours  dextrose 10% Bolus 125 milliLiter(s) IV Bolus once  dextrose 5%. 1000 milliLiter(s) (50 mL/Hr) IV Continuous <Continuous>  dextrose 5%. 1000 milliLiter(s) (100 mL/Hr) IV Continuous <Continuous>  dextrose 50% Injectable 25 Gram(s) IV Push once  dextrose 50% Injectable 12.5 Gram(s) IV Push once  digoxin     Tablet 250 MICROGram(s) Oral daily  furosemide   Injectable 40 milliGRAM(s) IV Push two times a day  glucagon  Injectable 1 milliGRAM(s) IntraMuscular once  insulin glargine Injectable (LANTUS) 23 Unit(s) SubCutaneous at bedtime  insulin lispro (ADMELOG) corrective regimen sliding scale   SubCutaneous three times a day before meals  insulin lispro (ADMELOG) corrective regimen sliding scale   SubCutaneous at bedtime  insulin lispro Injectable (ADMELOG) 7 Unit(s) SubCutaneous three times a day before meals  levothyroxine 125 MICROGram(s) Oral daily  losartan 25 milliGRAM(s) Oral daily  potassium chloride    Tablet ER 20 milliEquivalent(s) Oral two times a day  rivaroxaban 20 milliGRAM(s) Oral with dinner    MEDICATIONS  (PRN):  acetaminophen     Tablet .. 650 milliGRAM(s) Oral every 6 hours PRN Temp greater or equal to 38C (100.4F), Mild Pain (1 - 3)  ALPRAZolam 0.25 milliGRAM(s) Oral daily PRN anxiety  aluminum hydroxide/magnesium hydroxide/simethicone Suspension 30 milliLiter(s) Oral every 4 hours PRN Dyspepsia  dextrose Oral Gel 15 Gram(s) Oral once PRN Blood Glucose LESS THAN 70 milliGRAM(s)/deciliter  melatonin 3 milliGRAM(s) Oral at bedtime PRN Insomnia  ondansetron Injectable 4 milliGRAM(s) IV Push every 8 hours PRN Nausea and/or Vomiting  zolpidem 5 milliGRAM(s) Oral at bedtime PRN Insomnia      Allergies    penicillin (Short breath; Rash)  statins (Vomiting)  erythromycin (Rash)  nystatin topical (Hives; Rash)  vancomycin (Short breath; Rash)    Intolerances        REVIEW OF SYSTEMS:  CONSTITUTIONAL: No fever or chills  HEENT:  No headache, no sore throat  RESPIRATORY: No cough, wheezing, or shortness of breath  CARDIOVASCULAR: +leg swelling, No chest pain, palpitations  GASTROINTESTINAL: No abd pain, nausea, vomiting, or diarrhea  GENITOURINARY: No dysuria, frequency, or hematuria  NEUROLOGICAL: no focal weakness or dizziness  MUSCULOSKELETAL: no myalgias     Vital Signs Last 24 Hrs  T(C): 36.8 (27 Apr 2024 12:02), Max: 36.8 (27 Apr 2024 12:02)  T(F): 98.2 (27 Apr 2024 12:02), Max: 98.2 (27 Apr 2024 12:02)  HR: 60 (27 Apr 2024 12:02) (60 - 70)  BP: 152/51 (27 Apr 2024 12:02) (103/63 - 171/75)  BP(mean): --  RR: 18 (27 Apr 2024 12:02) (18 - 18)  SpO2: 93% (27 Apr 2024 12:02) (93% - 98%)    Parameters below as of 27 Apr 2024 12:02  Patient On (Oxygen Delivery Method): room air        PHYSICAL EXAM:  GENERAL: NAD  HEENT:  anicteric, moist mucous membranes  CHEST/LUNG:  decreased breath sounds b/l  HEART:  RRR, S1, S2  ABDOMEN:  BS+, soft, nontender, nondistended  EXTREMITIES: LE edema bilaterally, legs wrapped in bandage  NERVOUS SYSTEM: answers questions and follows commands appropriately      LABS:                        12.2   4.20  )-----------( 141      ( 27 Apr 2024 06:49 )             39.5     CBC Full  -  ( 27 Apr 2024 06:49 )  WBC Count : 4.20 K/uL  Hemoglobin : 12.2 g/dL  Hematocrit : 39.5 %  Platelet Count - Automated : 141 K/uL  Mean Cell Volume : 91.6 fl  Mean Cell Hemoglobin : 28.3 pg  Mean Cell Hemoglobin Concentration : 30.9 gm/dL  Auto Neutrophil # : x  Auto Lymphocyte # : x  Auto Monocyte # : x  Auto Eosinophil # : x  Auto Basophil # : x  Auto Neutrophil % : x  Auto Lymphocyte % : x  Auto Monocyte % : x  Auto Eosinophil % : x  Auto Basophil % : x    27 Apr 2024 06:49    140    |  96     |  15     ----------------------------<  173    3.5     |  36     |  0.81     Ca    8.6        27 Apr 2024 06:49      PT/INR - ( 25 Apr 2024 19:16 )   PT: 16.9 sec;   INR: 1.46 ratio         PTT - ( 25 Apr 2024 19:16 )  PTT:34.4 sec  Urinalysis Basic - ( 27 Apr 2024 06:49 )    Color: x / Appearance: x / SG: x / pH: x  Gluc: 173 mg/dL / Ketone: x  / Bili: x / Urobili: x   Blood: x / Protein: x / Nitrite: x   Leuk Esterase: x / RBC: x / WBC x   Sq Epi: x / Non Sq Epi: x / Bacteria: x      CAPILLARY BLOOD GLUCOSE      POCT Blood Glucose.: 227 mg/dL (27 Apr 2024 11:31)  POCT Blood Glucose.: 167 mg/dL (27 Apr 2024 07:54)  POCT Blood Glucose.: 153 mg/dL (26 Apr 2024 21:06)  POCT Blood Glucose.: 150 mg/dL (26 Apr 2024 17:11)          RADIOLOGY & ADDITIONAL TESTS:  Personally reviewed.     Consultant(s) Notes Reviewed:  [x] YES  [ ] NO Patient is a 73y old  Female who presents with a chief complaint of chf exacerbation (27 Apr 2024 08:41)        INTERVAL HPI/OVERNIGHT EVENTS: No acute overnight events. Pt seen and examined at the bedside this AM. Pt feels anxious this morning but otherwise no acute complaints. Reports improvement in her breathing. Tolerating RA well. Denies cp, sob, abdominal pain, n/v.    Feels as though some of her edema is improving. Trialed metolazone (tolerated thiazide in past) for synergy with IV lasix.      MEDICATIONS  (STANDING):  carvedilol 12.5 milliGRAM(s) Oral every 12 hours  dextrose 10% Bolus 125 milliLiter(s) IV Bolus once  dextrose 5%. 1000 milliLiter(s) (50 mL/Hr) IV Continuous <Continuous>  dextrose 5%. 1000 milliLiter(s) (100 mL/Hr) IV Continuous <Continuous>  dextrose 50% Injectable 25 Gram(s) IV Push once  dextrose 50% Injectable 12.5 Gram(s) IV Push once  digoxin     Tablet 250 MICROGram(s) Oral daily  furosemide   Injectable 40 milliGRAM(s) IV Push two times a day  glucagon  Injectable 1 milliGRAM(s) IntraMuscular once  insulin glargine Injectable (LANTUS) 23 Unit(s) SubCutaneous at bedtime  insulin lispro (ADMELOG) corrective regimen sliding scale   SubCutaneous three times a day before meals  insulin lispro (ADMELOG) corrective regimen sliding scale   SubCutaneous at bedtime  insulin lispro Injectable (ADMELOG) 7 Unit(s) SubCutaneous three times a day before meals  levothyroxine 125 MICROGram(s) Oral daily  losartan 25 milliGRAM(s) Oral daily  potassium chloride    Tablet ER 20 milliEquivalent(s) Oral two times a day  rivaroxaban 20 milliGRAM(s) Oral with dinner    MEDICATIONS  (PRN):  acetaminophen     Tablet .. 650 milliGRAM(s) Oral every 6 hours PRN Temp greater or equal to 38C (100.4F), Mild Pain (1 - 3)  ALPRAZolam 0.25 milliGRAM(s) Oral daily PRN anxiety  aluminum hydroxide/magnesium hydroxide/simethicone Suspension 30 milliLiter(s) Oral every 4 hours PRN Dyspepsia  dextrose Oral Gel 15 Gram(s) Oral once PRN Blood Glucose LESS THAN 70 milliGRAM(s)/deciliter  melatonin 3 milliGRAM(s) Oral at bedtime PRN Insomnia  ondansetron Injectable 4 milliGRAM(s) IV Push every 8 hours PRN Nausea and/or Vomiting  zolpidem 5 milliGRAM(s) Oral at bedtime PRN Insomnia      Allergies    penicillin (Short breath; Rash)  statins (Vomiting)  erythromycin (Rash)  nystatin topical (Hives; Rash)  vancomycin (Short breath; Rash)    Intolerances        REVIEW OF SYSTEMS:  CONSTITUTIONAL: No fever or chills  HEENT:  No headache, no sore throat  RESPIRATORY: No cough, wheezing, or shortness of breath  CARDIOVASCULAR: +leg swelling, No chest pain, palpitations  GASTROINTESTINAL: No abd pain, nausea, vomiting, or diarrhea  GENITOURINARY: No dysuria, frequency, or hematuria  NEUROLOGICAL: no focal weakness or dizziness  MUSCULOSKELETAL: no myalgias     Vital Signs Last 24 Hrs  T(C): 36.8 (27 Apr 2024 12:02), Max: 36.8 (27 Apr 2024 12:02)  T(F): 98.2 (27 Apr 2024 12:02), Max: 98.2 (27 Apr 2024 12:02)  HR: 60 (27 Apr 2024 12:02) (60 - 70)  BP: 152/51 (27 Apr 2024 12:02) (103/63 - 171/75)  BP(mean): --  RR: 18 (27 Apr 2024 12:02) (18 - 18)  SpO2: 93% (27 Apr 2024 12:02) (93% - 98%)    Parameters below as of 27 Apr 2024 12:02  Patient On (Oxygen Delivery Method): room air        PHYSICAL EXAM:  GENERAL: NAD  HEENT:  anicteric, moist mucous membranes  CHEST/LUNG:  decreased breath sounds b/l  HEART:  RRR, S1, S2  ABDOMEN:  BS+, soft, nontender, nondistended  EXTREMITIES: LE edema bilaterally, legs wrapped in bandage  NERVOUS SYSTEM: answers questions and follows commands appropriately      LABS:                        12.2   4.20  )-----------( 141      ( 27 Apr 2024 06:49 )             39.5     CBC Full  -  ( 27 Apr 2024 06:49 )  WBC Count : 4.20 K/uL  Hemoglobin : 12.2 g/dL  Hematocrit : 39.5 %  Platelet Count - Automated : 141 K/uL  Mean Cell Volume : 91.6 fl  Mean Cell Hemoglobin : 28.3 pg  Mean Cell Hemoglobin Concentration : 30.9 gm/dL  Auto Neutrophil # : x  Auto Lymphocyte # : x  Auto Monocyte # : x  Auto Eosinophil # : x  Auto Basophil # : x  Auto Neutrophil % : x  Auto Lymphocyte % : x  Auto Monocyte % : x  Auto Eosinophil % : x  Auto Basophil % : x    27 Apr 2024 06:49    140    |  96     |  15     ----------------------------<  173    3.5     |  36     |  0.81     Ca    8.6        27 Apr 2024 06:49      PT/INR - ( 25 Apr 2024 19:16 )   PT: 16.9 sec;   INR: 1.46 ratio         PTT - ( 25 Apr 2024 19:16 )  PTT:34.4 sec  Urinalysis Basic - ( 27 Apr 2024 06:49 )    Color: x / Appearance: x / SG: x / pH: x  Gluc: 173 mg/dL / Ketone: x  / Bili: x / Urobili: x   Blood: x / Protein: x / Nitrite: x   Leuk Esterase: x / RBC: x / WBC x   Sq Epi: x / Non Sq Epi: x / Bacteria: x      CAPILLARY BLOOD GLUCOSE      POCT Blood Glucose.: 227 mg/dL (27 Apr 2024 11:31)  POCT Blood Glucose.: 167 mg/dL (27 Apr 2024 07:54)  POCT Blood Glucose.: 153 mg/dL (26 Apr 2024 21:06)  POCT Blood Glucose.: 150 mg/dL (26 Apr 2024 17:11)          RADIOLOGY & ADDITIONAL TESTS:  Personally reviewed.     Consultant(s) Notes Reviewed:  [x] YES  [ ] NO

## 2024-04-27 NOTE — PHYSICAL THERAPY INITIAL EVALUATION ADULT - PERTINENT HX OF CURRENT PROBLEM, REHAB EVAL
As per H&P, pt is a 73 year old female with a past medical history of atrial fibrillation on Xarelto, bradycardia status post pacemaker, hypertension, diabetes, cardiomyopathy, pulmonary hypertension and is presenting with lower extremity edema.  States that this similar presentation happened to her previously when she was admitted to the hospital.  She states her LE edema improved after discharge in January but then has been worsening for the past month. She endorse some shortness of breath and difficulty laying flat as well. with her symptoms.  Denies any chest pain.  States that she saw her endocrinologist and was told she had gained 35 pounds in water weight due to the swelling in her legs. She states her legs do feel uncomfortable States that ambulation has been more difficult for her recently due to her symptoms.   She states denies any other complaints. She follows with Cardio Dr. Yvan Ellis. Last TTE in 1/2024 showed EF 45-50%.
Per H&P: pt is a 73 year old female with a past medical history of atrial fibrillation on Xarelto, bradycardia status post pacemaker, hypertension, diabetes, cardiomyopathy, pulmonary hypertension is presenting with lower extremity edema.  States that this similar presentation happened to her previously when she was admitted to the hospital.  She states her LE edema improved after discharge in January but then has been worsening for the past month. She endorse some shortness of breath and difficulty laying flat as well. with her symptoms.  Denies any chest pain.  States that she saw her endocrinologist and was told she had gained 35 pounds in water weight due to the swelling in her legs. She states her legs do feel uncomfortable States that ambulation has been more difficult for her recently due to her symptoms.

## 2024-04-27 NOTE — PHYSICAL THERAPY INITIAL EVALUATION ADULT - GAIT TRAINING, PT EVAL
Patient will ambulate 100 feet independently with rolling walker by 2 weeks to allow for increased independence in the community.

## 2024-04-27 NOTE — PHYSICAL THERAPY INITIAL EVALUATION ADULT - NSPTDMEREC_GEN_A_CORE
The patient has a mobility limitation that significantly impairs their ability to participate in one or more mobility related activities of daily living within the home.  By utilizing a rolling walker/rollator the functional mobility deficit can be sufficiently resolved.  The patient demonstrates safe use of rolling walker/rollator./rolling walker Pt reports she has a rollator however it is "too heavy, otherwise all AD was from her mother. The patient has a mobility limitation that significantly impairs their ability to participate in one or more mobility related activities of daily living within the home.  By utilizing a rolling walker/rollator the functional mobility deficit can be sufficiently resolved.  The patient demonstrates safe use of rolling walker/rollator./rolling walker

## 2024-04-27 NOTE — PHYSICAL THERAPY INITIAL EVALUATION ADULT - ADDITIONAL COMMENTS
Pt lives alone in pvt 2 story home with 3 JULIO C + bilateral HR. Pt has a bathroom on the first floor and a bedroom and bathroom on the second floor. The first floor bathroom has grab bars and raised toilet seat. The second floor bathroom has grab bars, and shower chair. PTA pt alternated between a RW, cane and occasionally no AD. Pt was Independent with dressing however required assistance with cleaning. Pt has a friend that would assist her with laundry and do grocery shopping.
Per EMR: "Pt lives alone in pvt 2 story home with 3 JULIO C + bilateral HR. Pt has a bathroom on the first floor and a bedroom and bathroom on the second floor. The first floor bathroom has grab bars and raised toilet seat. The second floor bathroom has grab bars, and shower chair. PTA pt alternated between a RW, cane and occasionally no AD. Pt was Independent with dressing however required assistance with cleaning. Pt has a friend that would assist her with laundry and do grocery shopping."

## 2024-04-27 NOTE — PROGRESS NOTE ADULT - SUBJECTIVE AND OBJECTIVE BOX
[facility] -- Josue Moore Pannella, Patel, Savella Goodger, Cohen  Office # [phone]      Follow Up:    chf   Subjective/Observations:     Seen beside, remains on nc reports improvement in LE edema, no shortness of breath no dizziness or chest pain  reports 40lb weight gain at home    REVIEW OF SYSTEMS: All other review of systems is negative unless indicated above    PAST MEDICAL & SURGICAL HISTORY:  Hypothyroid      DM (diabetes mellitus)      HTN (hypertension)      Atrial fibrillation and flutter  s/p ablation      Bradycardia  s/p MDT PPM      H/O pulmonary hypertension      H/O hyperparathyroidism      H/O cardiomyopathy      Cardiac pacemaker      H/O cardiac radiofrequency ablation      H/O carpal tunnel repair      History of parathyroid surgery          MEDICATIONS  (STANDING):  carvedilol 12.5 milliGRAM(s) Oral every 12 hours  dextrose 10% Bolus 125 milliLiter(s) IV Bolus once  dextrose 5%. 1000 milliLiter(s) (50 mL/Hr) IV Continuous <Continuous>  dextrose 5%. 1000 milliLiter(s) (100 mL/Hr) IV Continuous <Continuous>  dextrose 50% Injectable 25 Gram(s) IV Push once  dextrose 50% Injectable 12.5 Gram(s) IV Push once  digoxin     Tablet 250 MICROGram(s) Oral daily  furosemide   Injectable 40 milliGRAM(s) IV Push two times a day  glucagon  Injectable 1 milliGRAM(s) IntraMuscular once  insulin glargine Injectable (LANTUS) 23 Unit(s) SubCutaneous at bedtime  insulin lispro (ADMELOG) corrective regimen sliding scale   SubCutaneous at bedtime  insulin lispro (ADMELOG) corrective regimen sliding scale   SubCutaneous three times a day before meals  insulin lispro Injectable (ADMELOG) 7 Unit(s) SubCutaneous three times a day before meals  levothyroxine 125 MICROGram(s) Oral daily  rivaroxaban 20 milliGRAM(s) Oral with dinner    MEDICATIONS  (PRN):  acetaminophen     Tablet .. 650 milliGRAM(s) Oral every 6 hours PRN Temp greater or equal to 38C (100.4F), Mild Pain (1 - 3)  aluminum hydroxide/magnesium hydroxide/simethicone Suspension 30 milliLiter(s) Oral every 4 hours PRN Dyspepsia  dextrose Oral Gel 15 Gram(s) Oral once PRN Blood Glucose LESS THAN 70 milliGRAM(s)/deciliter  melatonin 3 milliGRAM(s) Oral at bedtime PRN Insomnia  ondansetron Injectable 4 milliGRAM(s) IV Push every 8 hours PRN Nausea and/or Vomiting  zolpidem 5 milliGRAM(s) Oral at bedtime PRN Insomnia      Allergies    penicillin (Short breath; Rash)  statins (Vomiting)  erythromycin (Rash)  nystatin topical (Hives; Rash)  vancomycin (Short breath; Rash)    Intolerances        Vital Signs Last 24 Hrs  T(C): 36.4 (2024 04:42), Max: 36.6 (2024 11:24)  T(F): 97.6 (2024 04:42), Max: 97.9 (2024 11:24)  HR: 70 (2024 06:03) (60 - 70)  BP: 171/75 (2024 04:42) (103/63 - 171/75)  BP(mean): --  RR: 18 (2024 04:42) (18 - 18)  SpO2: 97% (2024 04:42) (92% - 98%)    Parameters below as of 2024 04:42  Patient On (Oxygen Delivery Method): room air        I&O's Summary    2024 07:01  -  2024 07:00  --------------------------------------------------------  IN: 0 mL / OUT: 1300 mL / NET: -1300 mL          PHYSICAL EXAM:  TELE: paced  Constitutional: NAD, awake and alert, obese  HEENT: Moist Mucous Membranes, Anicteric  Pulmonary: Non-labored, breath sounds are dim   Cardiovascular: Regular, S1 and S2 nl, DAVIDE   Gastrointestinal: Bowel Sounds present, soft, nontender.   Lymph: + peripheral edema.  Skin: No visible rashes or ulcers.  Psych:  Mood & affect appropriate    LABS: All Labs Reviewed:                        12.1     )-----------( 138      ( 2024 07:55 )             38.5                         12.5   4.58  )-----------( 156      ( 2024 19:16 )             41.7     2024 07:55    138    |  98     |  15     ----------------------------<  264    4.1     |  34     |  0.81   2024 19:16    140    |  101    |  17     ----------------------------<  179    4.1     |  34     |  0.77     Ca    8.6        2024 07:55  Ca    8.3        2024 19:16  Mg     2.1       2024 19:16    TPro  7.3    /  Alb  3.1    /  TBili  1.1    /  DBili  x      /  AST  24     /  ALT  16     /  AlkPhos  116    2024 07:55  TPro  7.4    /  Alb  3.2    /  TBili  0.7    /  DBili  x      /  AST  25     /  ALT  17     /  AlkPhos  130    2024 19:16    PT/INR - ( 2024 19:16 )   PT: 16.9 sec;   INR: 1.46 ratio         PTT - ( 2024 19:16 )  PTT:34.4 sec         EC Lead ECG:   Ventricular Rate 61 BPM    Atrial Rate 61 BPM    P-R Interval 432 ms    QRS Duration 174 ms    Q-T Interval 450 ms    QTC Calculation(Bazett) 453 ms    R Axis -50 degrees    T Axis 126 degrees    Diagnosis Line Sinus rhythm with 1st degree AV block  atrial-paced complexes  Right bundle branch block  Left anterior fascicular block  *** Bifascicular block ***  Left ventricular hypertrophy with repolarization abnormality ( R in aVL )    Confirmed by USHA BEE ([id number]) on 2024 7:51:43 AM (24 @ 16:56)      TRANSTHORACIC ECHOCARDIOGRAM REPORT  ________________________________________________________________________________                                      _______       Pt. Name:       ROSITA GUTIERREZ Study Date:    2024  MRN:            [id number]          YOB: 1950  Accession #:    [id number]         Age:           73 years  Account#:       [id number]        Gender:        F  Heart Rate:                       Height:        ( )  Rhythm:                           Weight:   220.46 lb (100.00 kg)  Blood Pressure: 160/61 mmHg       BSA/BMI:       2.15 m² /  ________________________________________________________________________________________  Referring Physician:    [id number] Lexx Cuenca  Interpreting Physician: Serafin Cooper  Primary Sonographer:    Bernabe Heredia    CPT:               ECHO TTE WO CON COMP W DOPP - 83699.m  Indication(s):     Dyspnea, unspecified - R06.00  Procedure:         Transthoracic echocardiogram with 2-D, M-mode and complete          spectral and color flow Doppler.  Ordering Location: [facility]  Admission Status:  Inpatient  Study Information: Image quality for this study is technically difficult.    _______________________________________________________________________________________     CONCLUSIONS:      1. Technically difficult image quality.   2. Left ventricular systolic function is mildly decreased with an ejection fraction visually estimated at 45 to 50 %. Global left ventricular hypokinesis.   3. Based on visualassessment, the right ventricle appears mildly enlarged. borderline reduced systolic function.   4. Device lead is visualized in the right heart.   5. The left atrium is moderately dilated.   6. The right atrium is dilated in size.   7. Symmetric mitral valve leaflet tethering.   8. Mild mitral regurgitation.   9. Trileaflet aortic valve with reduced systolic excursion. There is calcification of the aortic valve leaflets. moderate aortic stenosis.  10. The DEVYN is estimated at 1.1sqcm.  11. Moderate to severe tricuspid regurgitation.  12. Estimated pulmonary artery systolic pressure is 78 mmHg, consistent with severe pulmonary hypertension.  13. The inferior vena cava is dilated measuring 2.67 cm in diameter, (dilated >2.1cm) with abnormal inspiratory collapse (abnormal <50%) consistent with elevated right atrial pressure (~15, range 10-20mmHg).    ________________________________________________________________________________________  FINDINGS:     Left Ventricle:  Left ventricular systolic function is mildly decreased with an ejection fraction visually estimated at 45 to 50%. There is global left ventricular hypokinesis.     Right Ventricle:  Based on visual assessment, the right ventricle appears mildly enlarged. Borderline reducedsystolic function. Tricuspid annular plane systolic excursion (TAPSE) is 2.3 cm (normal >=1.7 cm). A device lead is visualized in the right heart.     Left Atrium:  The left atrium is moderately dilated with an indexed volume of 45.99 ml/m².     Right Atrium:  The right atrium is dilated in size.     Aortic Valve:  The aortic valve appears trileaflet with reduced systolic excursion. There is calcification of the aortic valve leaflets. There is moderate aortic stenosis. The peak transaortic velocity is 2.61 m/s, peak transaortic gradient is 27.2 mmHg and mean transaortic gradient is 13.0 mmHg with an LVOT/aortic valve VTI ratio of 0.31. The aortic valve area is estimated at 1.09 cm² by the continuity equation. The DEVYN is estimated at 1.1sqcm. There is trace aortic regurgitation.     Mitral Valve:  There is calcification of the mitral valve annulus. Mitral valve leaflets are diffusely calcified. There is symmetric leaflet tethering. There is mild mitral regurgitation.     Tricuspid Valve:  Structurally normal tricuspid valve with normal leaflet excursion. There is moderate to severe tricuspid regurgitation. Estimated pulmonary artery systolic pressure is 78 mmHg, consistent with severe pulmonary hypertension.     Pulmonic Valve:  The pulmonic valve was not well visualized. There is mild pulmonic regurgitation.     Aorta:  The aortic root at the sinuses of Valsalva is normal in size, measuring 3.00 cm (indexed 1.39 cm/m²). The ascending aorta diameter is normal in size, measuring 2.60 cm (indexed 1.21 cm/m²).     Systemic Veins:  The inferior vena cava is dilated measuring 2.67 cm in diameter, (dilated >2.1cm) with abnormal inspiratory collapse (abnormal <50%) consistent with elevated right atrial pressure (~15, range 10-20mmHg).  ____________________________________________________________________  QUANTITATIVE DATA:  Left Ventricle Measurements: (Indexed to BSA)     IVSd (2D):   1.4 cm  LVPWd (2D):  1.3 cm  LVIDd (2D):  5.4 cm  LVIDs (2D):  4.5 cm  LV Mass:     314 g  145.8g/m²  Visualized LV EF%: 45 to 50%     MV E Vmax:    1.71 m/s  MV A Vmax:    0.75 m/s  MV E/A:       2.27  e' lateral:   6.42 cm/s  e' medial:    5.87 cm/s  E/e' lateral: 26.64  E/e' medial:  29.13  E/e' Average: 27.83  MV DT:        161 msec    Aorta Measurements: (normal range) (Indexed to BSA)     Sinuses of Valsalva: 3.00 cm (2.7 - 3.3 cm)  Ao Asc prox:         2.60 cm       Left Atrium Measurements: (Indexed to BSA)  LA Diam 2D: 5.00 cm    Right Ventricle Measurements:     TAPSE:            2.3 cm  RV Base (RVID1):  4.3 cm  RV Mid (RVID2):   3.2 cm  RV Major (RVID3): 7.5 cm       LVOT / RVOT/ Qp/Qs Data: (Indexed to BSA)  LVOT Diameter: 2.10 cm  LVOT Vmax:     1.02 m/s  LVOT VTI:      17.80 cm  LVOT SV:       61.7 ml  28.61 ml/m²    Aortic Valve Measurements:  AV Vmax:                2.6 m/s  AV Peak Gradient:       27.2 mmHg  AV Mean Gradient:       13.0 mmHg  AV VTI:                 56.6 cm  AV VTI Ratio:           0.31  AoV EOA, Contin:        1.09 cm²  AoV EOA, Contin i:      0.51 cm²/m²  AoV Dimensionless Index 0.31    Mitral Valve Measurements:     MV E Vmax: 1.7 m/s         MR Vmax:          5.05 m/s  MV A Vmax: 0.8 m/s         MR Peak Gradient: 102.0 mmHg  MV E/A:    2.3       Tricuspid Valve Measurements:     TR Vmax:      4.0 m/s  TR Peak Gradient: 62.7 mmHg  RA Pressure:      15 mmHg  PASP:             78 mmHg    ________________________________________________________________________________________  Electronically signed on 2024 at 5:39:34 PM by Serafin Cooper         *** Final ***      Radiology:

## 2024-04-27 NOTE — PHYSICAL THERAPY INITIAL EVALUATION ADULT - DIAGNOSIS, PT EVAL
deconditioned and muscle weakness s/p dx of CHF exacerbation
Patient presents with weakness, impaired balance, and decreased endurance impacting ability to complete functional mobility/transfers leading to increased risk for falls.

## 2024-04-27 NOTE — PROGRESS NOTE ADULT - ASSESSMENT
73F PMH HFrEF, atrial fibrillation and atrial flutter s/p ablation, HTN, DM, non ischemic cardiomyopathy with moderate LV dysfunction, pulmonary hypertension, depression, bradycardia and conduction disease s/p Medtronic dual chamber pacemaker implant, edema, hypothyroidism, hyperparathyroidism, presents with shortness of breath, and LE edema. Cardiology called for SOB. Dr. Ellis: Cardio.       - EKG shows SR w/ 1st degree AV block, RBBB, LVH HR 61  -- No clear evidence of acute ischemia, trops negative x 1.   -has known history of afib on xarelto  -continue coreg and digoxin, check dig level     - Patient presented with SOB, orthopnea, LE edema, with 40lb weight gain   - Continue with IV lasix 40mg BID   - Echo: 1/24  ef 40-45% Global left ventricular hypokinesis. Moderate TR, Severe Pulm HTN, moderate AS  - Repeat TTE ordered, f/u results  - GDMT continue coreg , start Losartan   - Monitor and replete lytes, keep K>4, Mg>2.  - Strict I/Os, daily weights.  -will need to obtain records from Dr Carrasco , unclear what ischemic beck pt has had in past , further plan pending review of outpt records     -hx dvt on xarelto      - Other cardiovascular workup will depend on clinical course.  - All other workup per primary team.  - Will continue to follow.

## 2024-04-27 NOTE — PROGRESS NOTE ADULT - ASSESSMENT
73 year old female with a past medical history of atrial fibrillation on Xarelto, bradycardia status post pacemaker, hypertension, diabetes, cardiomyopathy, pulmonary hypertension is presenting with lower extremity edema. Admit for acute decompensated congestive heart failure with mid-range ejection fraction.

## 2024-04-27 NOTE — PROGRESS NOTE ADULT - PROBLEM SELECTOR PLAN 1
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 Dumont (wound care) will consult on Monday  - Cardio following, recs appreciated

## 2024-04-27 NOTE — PROGRESS NOTE ADULT - PROBLEM SELECTOR PLAN 6
-chronic  - home med: xanax 0.25mg prn daily  - will continue prn xanax -chronic  - home med: xanax 0.25mg prn daily  - will continue prn xanax  - clearly has some adjustment/depressed mood - pt declining psych consult for now as she does not want extra medications - states that she would be more amenable for a therapist.  - phillip REYES to try to arrange for service dog visit during week

## 2024-04-27 NOTE — PHYSICAL THERAPY INITIAL EVALUATION ADULT - GENERAL OBSERVATIONS, REHAB EVAL
Pt received semi-rec in bed +tele monitor, appears in NAD.
Patient was received sitting at edge of bed in [facility].

## 2024-04-27 NOTE — PROGRESS NOTE ADULT - PROBLEM SELECTOR PLAN 2
atrial fibrillation on xarelto, atrial flutter s/p ablation, bradycardia and syncope s/p ppm implant  - continue home xarelto  - continue digoxin, (dig level WNL)  - remote tele

## 2024-04-28 LAB
ALBUMIN SERPL ELPH-MCNC: 3 G/DL — LOW (ref 3.3–5)
ALP SERPL-CCNC: 104 U/L — SIGNIFICANT CHANGE UP (ref 40–120)
ALT FLD-CCNC: 12 U/L — SIGNIFICANT CHANGE UP (ref 12–78)
ANION GAP SERPL CALC-SCNC: 4 MMOL/L — LOW (ref 5–17)
AST SERPL-CCNC: 17 U/L — SIGNIFICANT CHANGE UP (ref 15–37)
BASOPHILS # BLD AUTO: 0.05 K/UL — SIGNIFICANT CHANGE UP (ref 0–0.2)
BASOPHILS NFR BLD AUTO: 1.2 % — SIGNIFICANT CHANGE UP (ref 0–2)
BILIRUB SERPL-MCNC: 1 MG/DL — SIGNIFICANT CHANGE UP (ref 0.2–1.2)
BUN SERPL-MCNC: 18 MG/DL — SIGNIFICANT CHANGE UP (ref 7–23)
CALCIUM SERPL-MCNC: 9.2 MG/DL — SIGNIFICANT CHANGE UP (ref 8.5–10.1)
CHLORIDE SERPL-SCNC: 96 MMOL/L — SIGNIFICANT CHANGE UP (ref 96–108)
CO2 SERPL-SCNC: 41 MMOL/L — HIGH (ref 22–31)
CREAT SERPL-MCNC: 0.79 MG/DL — SIGNIFICANT CHANGE UP (ref 0.5–1.3)
EGFR: 78 ML/MIN/1.73M2 — SIGNIFICANT CHANGE UP
EOSINOPHIL # BLD AUTO: 0.27 K/UL — SIGNIFICANT CHANGE UP (ref 0–0.5)
EOSINOPHIL NFR BLD AUTO: 6.7 % — HIGH (ref 0–6)
GLUCOSE SERPL-MCNC: 223 MG/DL — HIGH (ref 70–99)
HCT VFR BLD CALC: 38.3 % — SIGNIFICANT CHANGE UP (ref 34.5–45)
HGB BLD-MCNC: 11.8 G/DL — SIGNIFICANT CHANGE UP (ref 11.5–15.5)
IMM GRANULOCYTES NFR BLD AUTO: 0.2 % — SIGNIFICANT CHANGE UP (ref 0–0.9)
LYMPHOCYTES # BLD AUTO: 1.34 K/UL — SIGNIFICANT CHANGE UP (ref 1–3.3)
LYMPHOCYTES # BLD AUTO: 33.3 % — SIGNIFICANT CHANGE UP (ref 13–44)
MAGNESIUM SERPL-MCNC: 1.7 MG/DL — SIGNIFICANT CHANGE UP (ref 1.6–2.6)
MCHC RBC-ENTMCNC: 28.2 PG — SIGNIFICANT CHANGE UP (ref 27–34)
MCHC RBC-ENTMCNC: 30.8 GM/DL — LOW (ref 32–36)
MCV RBC AUTO: 91.4 FL — SIGNIFICANT CHANGE UP (ref 80–100)
MONOCYTES # BLD AUTO: 0.42 K/UL — SIGNIFICANT CHANGE UP (ref 0–0.9)
MONOCYTES NFR BLD AUTO: 10.4 % — SIGNIFICANT CHANGE UP (ref 2–14)
NEUTROPHILS # BLD AUTO: 1.94 K/UL — SIGNIFICANT CHANGE UP (ref 1.8–7.4)
NEUTROPHILS NFR BLD AUTO: 48.2 % — SIGNIFICANT CHANGE UP (ref 43–77)
NRBC # BLD: 0 /100 WBCS — SIGNIFICANT CHANGE UP (ref 0–0)
PHOSPHATE SERPL-MCNC: 3.9 MG/DL — SIGNIFICANT CHANGE UP (ref 2.5–4.5)
PLATELET # BLD AUTO: 140 K/UL — LOW (ref 150–400)
POTASSIUM SERPL-MCNC: 3.5 MMOL/L — SIGNIFICANT CHANGE UP (ref 3.5–5.3)
POTASSIUM SERPL-SCNC: 3.5 MMOL/L — SIGNIFICANT CHANGE UP (ref 3.5–5.3)
PROT SERPL-MCNC: 7 G/DL — SIGNIFICANT CHANGE UP (ref 6–8.3)
RBC # BLD: 4.19 M/UL — SIGNIFICANT CHANGE UP (ref 3.8–5.2)
RBC # FLD: 15 % — HIGH (ref 10.3–14.5)
SODIUM SERPL-SCNC: 141 MMOL/L — SIGNIFICANT CHANGE UP (ref 135–145)
WBC # BLD: 4.03 K/UL — SIGNIFICANT CHANGE UP (ref 3.8–10.5)
WBC # FLD AUTO: 4.03 K/UL — SIGNIFICANT CHANGE UP (ref 3.8–10.5)

## 2024-04-28 PROCEDURE — 99233 SBSQ HOSP IP/OBS HIGH 50: CPT | Mod: GC

## 2024-04-28 PROCEDURE — 99233 SBSQ HOSP IP/OBS HIGH 50: CPT

## 2024-04-28 RX ORDER — LOSARTAN POTASSIUM 100 MG/1
50 TABLET, FILM COATED ORAL DAILY
Refills: 0 | Status: DISCONTINUED | OUTPATIENT
Start: 2024-04-29 | End: 2024-04-29

## 2024-04-28 RX ORDER — MAGNESIUM SULFATE 500 MG/ML
2 VIAL (ML) INJECTION ONCE
Refills: 0 | Status: COMPLETED | OUTPATIENT
Start: 2024-04-28 | End: 2024-04-28

## 2024-04-28 RX ADMIN — ZOLPIDEM TARTRATE 5 MILLIGRAM(S): 10 TABLET ORAL at 22:52

## 2024-04-28 RX ADMIN — Medication 0.25 MILLIGRAM(S): at 17:00

## 2024-04-28 RX ADMIN — LOSARTAN POTASSIUM 25 MILLIGRAM(S): 100 TABLET, FILM COATED ORAL at 05:37

## 2024-04-28 RX ADMIN — Medication 4: at 08:02

## 2024-04-28 RX ADMIN — Medication 650 MILLIGRAM(S): at 22:52

## 2024-04-28 RX ADMIN — Medication 4: at 16:49

## 2024-04-28 RX ADMIN — Medication 650 MILLIGRAM(S): at 05:45

## 2024-04-28 RX ADMIN — Medication 40 MILLIGRAM(S): at 05:38

## 2024-04-28 RX ADMIN — Medication 7 UNIT(S): at 08:04

## 2024-04-28 RX ADMIN — Medication 7 UNIT(S): at 12:00

## 2024-04-28 RX ADMIN — Medication 650 MILLIGRAM(S): at 23:50

## 2024-04-28 RX ADMIN — CARVEDILOL PHOSPHATE 12.5 MILLIGRAM(S): 80 CAPSULE, EXTENDED RELEASE ORAL at 05:37

## 2024-04-28 RX ADMIN — Medication 250 MICROGRAM(S): at 05:37

## 2024-04-28 RX ADMIN — Medication 20 MILLIEQUIVALENT(S): at 08:02

## 2024-04-28 RX ADMIN — Medication 4: at 12:01

## 2024-04-28 RX ADMIN — Medication 7 UNIT(S): at 16:49

## 2024-04-28 RX ADMIN — CARVEDILOL PHOSPHATE 12.5 MILLIGRAM(S): 80 CAPSULE, EXTENDED RELEASE ORAL at 17:00

## 2024-04-28 RX ADMIN — Medication 125 MICROGRAM(S): at 05:37

## 2024-04-28 RX ADMIN — RIVAROXABAN 20 MILLIGRAM(S): KIT at 16:48

## 2024-04-28 RX ADMIN — Medication 25 GRAM(S): at 15:14

## 2024-04-28 RX ADMIN — INSULIN GLARGINE 23 UNIT(S): 100 INJECTION, SOLUTION SUBCUTANEOUS at 22:52

## 2024-04-28 NOTE — PROGRESS NOTE ADULT - ASSESSMENT
73F PMH HFrEF, atrial fibrillation and atrial flutter s/p ablation, HTN, DM, non ischemic cardiomyopathy with moderate LV dysfunction, pulmonary hypertension, depression, bradycardia and conduction disease s/p Medtronic dual chamber pacemaker implant, edema, hypothyroidism, hyperparathyroidism, presents with shortness of breath, and LE edema. Cardiology called for SOB. Dr. Ellis: Cardio.       - EKG shows SR w/ 1st degree AV block, RBBB, LVH HR 61  -- No clear evidence of acute ischemia, trops negative x 1.   -has known history of afib on xarelto  -continue coreg and digoxin, check dig level     - Patient presented with SOB, orthopnea, LE edema, with 40lb weight gain   - on iv lasix now with increase in bicarb, an hold PM dose and reassess in am , ir worsens can give diamox 250mg po bid x 48 hours   - Echo: 1/24  ef 40-45% Global left ventricular hypokinesis. Moderate TR, Severe Pulm HTN, moderate AS  - Repeat TTE ordered, f/u results  - GDMT continue coreg , start Losartan   - Monitor and replete lytes, keep K>4, Mg>2.  - Strict I/Os, daily weights.  -will need to obtain records from Dr Carrasco , unclear what ischemic beck pt has had in past , further plan pending review of outpt records     -bp suboptimal start losartan for GDMT continue coreg     -hx dvt on xarelto      - Other cardiovascular workup will depend on clinical course.  - All other workup per primary team.  - Will continue to follow.

## 2024-04-28 NOTE — PROGRESS NOTE ADULT - PROBLEM SELECTOR PLAN 1
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)  - TTE 1/2024 LVEF 45-50%  - now with LE pitting edema, sob and orthopnea  - f/u TTE  - continue home Coreg  - s/p Lasix 40mg IVP BID - given metolazone x 1 on 4/27 as a trial  - Increase losartan to 50mg QD for GDMT - can uptitrate - was on 75mg daily on DC but was told by follow up that this did not to be refilled - watch renal indices closely  - developing contraction alkalosis on loop diuretic  - rising bicarb -- will d/c lasix, will add diamox if bicarb continues to worsen   - pt w/ weeping LE wounds -- sees Dr. Dumont (wound care) will consult on Monday  - Cardio following, recs appreciated

## 2024-04-28 NOTE — CARE COORDINATION ASSESSMENT. - OTHER PERTINENT DISCHARGE PLANNING INFORMATION:
Met patient at bedside.  Explained role of CM, verbalized understanding. Pt was made aware a CM will remain available through hospitalization.  Contact information given in discharge/ transitions resource folder. Patient was upset with hearing she may be ready for transition to home. Cm explained that discussed onam rounds and will have MD clarify with her. As of this discussion Cm would like to discuss her current living situaion any HC and potential needs.

## 2024-04-28 NOTE — PROGRESS NOTE ADULT - SUBJECTIVE AND OBJECTIVE BOX
Patient is a 73y old  Female who presents with a chief complaint of chf exacerbation (28 Apr 2024 08:58)      INTERVAL HPI/OVERNIGHT EVENTS: No acute overnight events. Pt seen and examined at the bedside this AM. Pt resting in bed comfortably. Tolerating RA well. Reports some improvement in abdominal edema and her breathing overall. Denies cp, sob, abdominal pain, n/v/d.      MEDICATIONS  (STANDING):  carvedilol 12.5 milliGRAM(s) Oral every 12 hours  dextrose 10% Bolus 125 milliLiter(s) IV Bolus once  dextrose 5%. 1000 milliLiter(s) (50 mL/Hr) IV Continuous <Continuous>  dextrose 5%. 1000 milliLiter(s) (100 mL/Hr) IV Continuous <Continuous>  dextrose 50% Injectable 25 Gram(s) IV Push once  dextrose 50% Injectable 12.5 Gram(s) IV Push once  digoxin     Tablet 250 MICROGram(s) Oral daily  glucagon  Injectable 1 milliGRAM(s) IntraMuscular once  insulin glargine Injectable (LANTUS) 23 Unit(s) SubCutaneous at bedtime  insulin lispro (ADMELOG) corrective regimen sliding scale   SubCutaneous three times a day before meals  insulin lispro (ADMELOG) corrective regimen sliding scale   SubCutaneous at bedtime  insulin lispro Injectable (ADMELOG) 7 Unit(s) SubCutaneous three times a day before meals  levothyroxine 125 MICROGram(s) Oral daily  rivaroxaban 20 milliGRAM(s) Oral with dinner    MEDICATIONS  (PRN):  acetaminophen     Tablet .. 650 milliGRAM(s) Oral every 6 hours PRN Temp greater or equal to 38C (100.4F), Mild Pain (1 - 3)  ALPRAZolam 0.25 milliGRAM(s) Oral daily PRN anxiety  aluminum hydroxide/magnesium hydroxide/simethicone Suspension 30 milliLiter(s) Oral every 4 hours PRN Dyspepsia  dextrose Oral Gel 15 Gram(s) Oral once PRN Blood Glucose LESS THAN 70 milliGRAM(s)/deciliter  melatonin 3 milliGRAM(s) Oral at bedtime PRN Insomnia  ondansetron Injectable 4 milliGRAM(s) IV Push every 8 hours PRN Nausea and/or Vomiting  zolpidem 5 milliGRAM(s) Oral at bedtime PRN Insomnia      Allergies    penicillin (Short breath; Rash)  statins (Vomiting)  erythromycin (Rash)  nystatin topical (Hives; Rash)  vancomycin (Short breath; Rash)    Intolerances        REVIEW OF SYSTEMS:  CONSTITUTIONAL: No fever or chills  HEENT:  No headache, no sore throat  RESPIRATORY: No cough, wheezing, or shortness of breath  CARDIOVASCULAR: No chest pain, palpitations  GASTROINTESTINAL: No abd pain, nausea, vomiting, or diarrhea  GENITOURINARY: No dysuria, frequency, or hematuria  NEUROLOGICAL: no focal weakness or dizziness  MUSCULOSKELETAL: no myalgias     Vital Signs Last 24 Hrs  T(C): 37.3 (28 Apr 2024 11:35), Max: 37.3 (28 Apr 2024 11:35)  T(F): 99.1 (28 Apr 2024 11:35), Max: 99.1 (28 Apr 2024 11:35)  HR: 63 (28 Apr 2024 11:35) (60 - 65)  BP: 168/74 (28 Apr 2024 05:30) (164/67 - 168/74)  BP(mean): --  RR: 18 (28 Apr 2024 05:30) (17 - 18)  SpO2: 92% (28 Apr 2024 11:35) (91% - 94%)    Parameters below as of 28 Apr 2024 11:35  Patient On (Oxygen Delivery Method): room air        PHYSICAL EXAM:  GENERAL: NAD  HEENT:  anicteric, moist mucous membranes  CHEST/LUNG:  decreased breath sounds b/l  HEART:  RRR, S1, S2  ABDOMEN:  BS+, soft, nontender, nondistended  EXTREMITIES: LE edema bilaterally, legs wrapped in bandage  NERVOUS SYSTEM: answers questions and follows commands appropriately      LABS:                        11.8   4.03  )-----------( 140      ( 28 Apr 2024 06:50 )             38.3     CBC Full  -  ( 28 Apr 2024 06:50 )  WBC Count : 4.03 K/uL  Hemoglobin : 11.8 g/dL  Hematocrit : 38.3 %  Platelet Count - Automated : 140 K/uL  Mean Cell Volume : 91.4 fl  Mean Cell Hemoglobin : 28.2 pg  Mean Cell Hemoglobin Concentration : 30.8 gm/dL  Auto Neutrophil # : 1.94 K/uL  Auto Lymphocyte # : 1.34 K/uL  Auto Monocyte # : 0.42 K/uL  Auto Eosinophil # : 0.27 K/uL  Auto Basophil # : 0.05 K/uL  Auto Neutrophil % : 48.2 %  Auto Lymphocyte % : 33.3 %  Auto Monocyte % : 10.4 %  Auto Eosinophil % : 6.7 %  Auto Basophil % : 1.2 %    28 Apr 2024 06:50    141    |  96     |  18     ----------------------------<  223    3.5     |  41     |  0.79     Ca    9.2        28 Apr 2024 06:50  Phos  3.9       28 Apr 2024 06:50  Mg     1.7       28 Apr 2024 06:50    TPro  7.0    /  Alb  3.0    /  TBili  1.0    /  DBili  x      /  AST  17     /  ALT  12     /  AlkPhos  104    28 Apr 2024 06:50      Urinalysis Basic - ( 28 Apr 2024 06:50 )    Color: x / Appearance: x / SG: x / pH: x  Gluc: 223 mg/dL / Ketone: x  / Bili: x / Urobili: x   Blood: x / Protein: x / Nitrite: x   Leuk Esterase: x / RBC: x / WBC x   Sq Epi: x / Non Sq Epi: x / Bacteria: x      CAPILLARY BLOOD GLUCOSE      POCT Blood Glucose.: 245 mg/dL (28 Apr 2024 12:00)  POCT Blood Glucose.: 221 mg/dL (28 Apr 2024 07:54)  POCT Blood Glucose.: 233 mg/dL (27 Apr 2024 21:54)  POCT Blood Glucose.: 207 mg/dL (27 Apr 2024 16:47)          RADIOLOGY & ADDITIONAL TESTS:  Personally reviewed.     Consultant(s) Notes Reviewed:  [x] YES  [ ] NO Patient is a 73y old  Female who presents with a chief complaint of chf exacerbation (28 Apr 2024 08:58)      INTERVAL HPI/OVERNIGHT EVENTS: No acute overnight events. Pt seen and examined at the bedside this AM. Pt resting in bed comfortably. Tolerating RA well. Reports some improvement in abdominal edema and her breathing overall. Denies cp, sob, abdominal pain, n/v/d.    Upset that a  told her that she's being discharged tomorrow - states that food was ordered on her phone from [employer] to arrive at her house between 3-5 tomorrow (????)    MEDICATIONS  (STANDING):  carvedilol 12.5 milliGRAM(s) Oral every 12 hours  dextrose 10% Bolus 125 milliLiter(s) IV Bolus once  dextrose 5%. 1000 milliLiter(s) (50 mL/Hr) IV Continuous <Continuous>  dextrose 5%. 1000 milliLiter(s) (100 mL/Hr) IV Continuous <Continuous>  dextrose 50% Injectable 25 Gram(s) IV Push once  dextrose 50% Injectable 12.5 Gram(s) IV Push once  digoxin     Tablet 250 MICROGram(s) Oral daily  glucagon  Injectable 1 milliGRAM(s) IntraMuscular once  insulin glargine Injectable (LANTUS) 23 Unit(s) SubCutaneous at bedtime  insulin lispro (ADMELOG) corrective regimen sliding scale   SubCutaneous three times a day before meals  insulin lispro (ADMELOG) corrective regimen sliding scale   SubCutaneous at bedtime  insulin lispro Injectable (ADMELOG) 7 Unit(s) SubCutaneous three times a day before meals  levothyroxine 125 MICROGram(s) Oral daily  rivaroxaban 20 milliGRAM(s) Oral with dinner    MEDICATIONS  (PRN):  acetaminophen     Tablet .. 650 milliGRAM(s) Oral every 6 hours PRN Temp greater or equal to 38C (100.4F), Mild Pain (1 - 3)  ALPRAZolam 0.25 milliGRAM(s) Oral daily PRN anxiety  aluminum hydroxide/magnesium hydroxide/simethicone Suspension 30 milliLiter(s) Oral every 4 hours PRN Dyspepsia  dextrose Oral Gel 15 Gram(s) Oral once PRN Blood Glucose LESS THAN 70 milliGRAM(s)/deciliter  melatonin 3 milliGRAM(s) Oral at bedtime PRN Insomnia  ondansetron Injectable 4 milliGRAM(s) IV Push every 8 hours PRN Nausea and/or Vomiting  zolpidem 5 milliGRAM(s) Oral at bedtime PRN Insomnia      Allergies    penicillin (Short breath; Rash)  statins (Vomiting)  erythromycin (Rash)  nystatin topical (Hives; Rash)  vancomycin (Short breath; Rash)    Intolerances        REVIEW OF SYSTEMS:  CONSTITUTIONAL: No fever or chills  HEENT:  No headache, no sore throat  RESPIRATORY: No cough, wheezing, or shortness of breath  CARDIOVASCULAR: No chest pain, palpitations  GASTROINTESTINAL: No abd pain, nausea, vomiting, or diarrhea  GENITOURINARY: No dysuria, frequency, or hematuria  NEUROLOGICAL: no focal weakness or dizziness  MUSCULOSKELETAL: no myalgias     Vital Signs Last 24 Hrs  T(C): 37.3 (28 Apr 2024 11:35), Max: 37.3 (28 Apr 2024 11:35)  T(F): 99.1 (28 Apr 2024 11:35), Max: 99.1 (28 Apr 2024 11:35)  HR: 63 (28 Apr 2024 11:35) (60 - 65)  BP: 168/74 (28 Apr 2024 05:30) (164/67 - 168/74)  RR: 18 (28 Apr 2024 05:30) (17 - 18)  SpO2: 92% (28 Apr 2024 11:35) (91% - 94%)    Parameters below as of 28 Apr 2024 11:35  Patient On (Oxygen Delivery Method): room air      PHYSICAL EXAM:  GENERAL: NAD, disheveled  HEENT:  anicteric, moist mucous membranes; poor dentition  CHEST/LUNG:  decreased breath sounds b/l  HEART:  RRR, S1, S2  ABDOMEN:  BS+, soft, nontender, nondistended  EXTREMITIES: LE edema bilaterally, legs wrapped in bandage  NERVOUS SYSTEM: answers questions and follows commands appropriately      LABS:                        11.8   4.03  )-----------( 140      ( 28 Apr 2024 06:50 )             38.3     CBC Full  -  ( 28 Apr 2024 06:50 )  WBC Count : 4.03 K/uL  Hemoglobin : 11.8 g/dL  Hematocrit : 38.3 %  Platelet Count - Automated : 140 K/uL  Mean Cell Volume : 91.4 fl  Mean Cell Hemoglobin : 28.2 pg  Mean Cell Hemoglobin Concentration : 30.8 gm/dL  Auto Neutrophil # : 1.94 K/uL  Auto Lymphocyte # : 1.34 K/uL  Auto Monocyte # : 0.42 K/uL  Auto Eosinophil # : 0.27 K/uL  Auto Basophil # : 0.05 K/uL  Auto Neutrophil % : 48.2 %  Auto Lymphocyte % : 33.3 %  Auto Monocyte % : 10.4 %  Auto Eosinophil % : 6.7 %  Auto Basophil % : 1.2 %    28 Apr 2024 06:50    141    |  96     |  18     ----------------------------<  223    3.5     |  41     |  0.79     Ca    9.2        28 Apr 2024 06:50  Phos  3.9       28 Apr 2024 06:50  Mg     1.7       28 Apr 2024 06:50    TPro  7.0    /  Alb  3.0    /  TBili  1.0    /  DBili  x      /  AST  17     /  ALT  12     /  AlkPhos  104    28 Apr 2024 06:50      Urinalysis Basic - ( 28 Apr 2024 06:50 )    Color: x / Appearance: x / SG: x / pH: x  Gluc: 223 mg/dL / Ketone: x  / Bili: x / Urobili: x   Blood: x / Protein: x / Nitrite: x   Leuk Esterase: x / RBC: x / WBC x   Sq Epi: x / Non Sq Epi: x / Bacteria: x      CAPILLARY BLOOD GLUCOSE      POCT Blood Glucose.: 245 mg/dL (28 Apr 2024 12:00)  POCT Blood Glucose.: 221 mg/dL (28 Apr 2024 07:54)  POCT Blood Glucose.: 233 mg/dL (27 Apr 2024 21:54)  POCT Blood Glucose.: 207 mg/dL (27 Apr 2024 16:47)          RADIOLOGY & ADDITIONAL TESTS:  Personally reviewed.     Consultant(s) Notes Reviewed:  [x] YES  [ ] NO

## 2024-04-28 NOTE — PROGRESS NOTE ADULT - PROBLEM SELECTOR PLAN 6
-chronic  - home med: xanax 0.25mg prn daily  - will continue prn xanax  - clearly has some adjustment/depressed mood - pt declining psych consult for now as she does not want extra medications - states that she would be more amenable for a therapist.  - phillip REYES to try to arrange for service dog visit during week -chronic  - home med: xanax 0.25mg prn daily  - will continue prn xanax  - clearly has some adjustment/depressed mood - pt declining psych consult for now as she does not want extra medications - states that she would be more amenable for a therapist.  - d/w CM to try to arrange for service dog visit during week

## 2024-04-28 NOTE — CARE COORDINATION ASSESSMENT. - NSCAREPROVIDERS_GEN_ALL_CORE_FT
CARE PROVIDERS:  Accepting Physician: Micah Ching  Administration: Daniel Farias  Administration: Steve Garzon  Administration: Kush Alvarez  Admitting: Micah Ching  Attending: Micah Ching  Case Management: Behringer, Megan  Consultant: Anson Gonzalez  Consultant: Shweta West  Consultant: Mary Sims  Consultant: Jamaal Stanton  ED Attending: Bridger Madrid  ED Nurse: Maame James  Nurse: Annalee South  Ordered: Doctor, Unknown  Ordered: ADM, User  Ordered: ServiceAccount, SCMMLM  Override: Annalee South  Override: John Gan  PCA/Nursing Assistant: Dhara Colon  PCA/Nursing Assistant: Laura Echeverria  Physical Therapy: Cornell Forbes  Primary Team: Micah Ching  Primary Team: Sawyer Alva  Primary Team: Bess Kiser  Primary Team: Nubia Hart  Primary Team: Zayda Rangel  Registered Dietitian: Kisha Doshi  Respiratory Therapy: Radha Mcmahon  : Kemi Hoffman  Team: [facility], Team

## 2024-04-28 NOTE — CARE COORDINATION ASSESSMENT. - LIVING ARRANGEMENTS/HOME SAFETY CONCERNS
as per patient she stated she needs food to be delivered, we sat together and I added [employer] to phone and showed her how to order food for delivery. SW consult for meals on wheels/concerns to be addressed

## 2024-04-28 NOTE — PROGRESS NOTE ADULT - SUBJECTIVE AND OBJECTIVE BOX
[facility] -- Josue Moore Pannella, Patel, Savella Goodger, Cohen  Office # [phone]      Follow Up:    chf  Subjective/Observations:     No events overnight resting comfortably in bed.  No complaints of chest pain, dyspnea, or palpitations reported. No signs of orthopnea or PND.  now on room air   \  REVIEW OF SYSTEMS: All other review of systems is negative unless indicated above    PAST MEDICAL & SURGICAL HISTORY:  Hypothyroid      DM (diabetes mellitus)      HTN (hypertension)      Atrial fibrillation and flutter  s/p ablation      Bradycardia  s/p MDT PPM      H/O pulmonary hypertension      H/O hyperparathyroidism      H/O cardiomyopathy      Cardiac pacemaker      H/O cardiac radiofrequency ablation      H/O carpal tunnel repair      History of parathyroid surgery          MEDICATIONS  (STANDING):  carvedilol 12.5 milliGRAM(s) Oral every 12 hours  dextrose 10% Bolus 125 milliLiter(s) IV Bolus once  dextrose 5%. 1000 milliLiter(s) (100 mL/Hr) IV Continuous <Continuous>  dextrose 5%. 1000 milliLiter(s) (50 mL/Hr) IV Continuous <Continuous>  dextrose 50% Injectable 12.5 Gram(s) IV Push once  dextrose 50% Injectable 25 Gram(s) IV Push once  digoxin     Tablet 250 MICROGram(s) Oral daily  furosemide   Injectable 40 milliGRAM(s) IV Push two times a day  glucagon  Injectable 1 milliGRAM(s) IntraMuscular once  insulin glargine Injectable (LANTUS) 23 Unit(s) SubCutaneous at bedtime  insulin lispro (ADMELOG) corrective regimen sliding scale   SubCutaneous three times a day before meals  insulin lispro (ADMELOG) corrective regimen sliding scale   SubCutaneous at bedtime  insulin lispro Injectable (ADMELOG) 7 Unit(s) SubCutaneous three times a day before meals  levothyroxine 125 MICROGram(s) Oral daily  potassium chloride    Tablet ER 20 milliEquivalent(s) Oral two times a day  rivaroxaban 20 milliGRAM(s) Oral with dinner    MEDICATIONS  (PRN):  acetaminophen     Tablet .. 650 milliGRAM(s) Oral every 6 hours PRN Temp greater or equal to 38C (100.4F), Mild Pain (1 - 3)  ALPRAZolam 0.25 milliGRAM(s) Oral daily PRN anxiety  aluminum hydroxide/magnesium hydroxide/simethicone Suspension 30 milliLiter(s) Oral every 4 hours PRN Dyspepsia  dextrose Oral Gel 15 Gram(s) Oral once PRN Blood Glucose LESS THAN 70 milliGRAM(s)/deciliter  melatonin 3 milliGRAM(s) Oral at bedtime PRN Insomnia  ondansetron Injectable 4 milliGRAM(s) IV Push every 8 hours PRN Nausea and/or Vomiting  zolpidem 5 milliGRAM(s) Oral at bedtime PRN Insomnia      Allergies    penicillin (Short breath; Rash)  statins (Vomiting)  erythromycin (Rash)  nystatin topical (Hives; Rash)  vancomycin (Short breath; Rash)    Intolerances        Vital Signs Last 24 Hrs  T(C): 36.7 (2024 05:30), Max: 36.8 (2024 12:02)  T(F): 98.1 (2024 05:30), Max: 98.2 (2024 12:02)  HR: 65 (2024 05:30) (60 - 65)  BP: 168/74 (2024 05:30) (152/51 - 168/74)  BP(mean): --  RR: 18 (2024 05:30) (17 - 18)  SpO2: 94% (2024 05:30) (91% - 94%)    Parameters below as of 2024 05:30  Patient On (Oxygen Delivery Method): room air        I&O's Summary    2024 07:01  -  2024 07:00  --------------------------------------------------------  IN: 0 mL / OUT: 1500 mL / NET: -1500 mL          PHYSICAL EXAM:  TELE: paced  Constitutional: NAD, awake and alert, obese  HEENT: Moist Mucous Membranes, Anicteric  Pulmonary: Non-labored, breath sounds are dim   Cardiovascular: Regular, S1 and S2 nl, DAVIDE   Gastrointestinal: Bowel Sounds present, soft, nontender.   Lymph:+ peripheral edema.  Skin: No visible rashes or ulcers.  Psych:  Mood & affect appropriate    LABS: All Labs Reviewed:                        11.8   4.03  )-----------( 140      ( 2024 06:50 )             38.3                         12.2   4.20  )-----------( 141      ( 2024 06:49 )             39.5                         12.1   4.28  )-----------( 138      ( 2024 07:55 )             38.5     2024 06:50    141    |  96     |  18     ----------------------------<  223    3.5     |  41     |  0.79   2024 06:49    140    |  96     |  15     ----------------------------<  173    3.5     |  36     |  0.81   2024 07:55    138    |  98     |  15     ----------------------------<  264    4.1     |  34     |  0.81     Ca    9.2        2024 06:50  Ca    8.6        2024 06:49  Ca    8.6        2024 07:55  Phos  3.9       2024 06:50  Mg     1.7       2024 06:50  Mg     2.1       2024 19:16    TPro  7.0    /  Alb  3.0    /  TBili  1.0    /  DBili  x      /  AST  17     /  ALT  12     /  AlkPhos  104    2024 06:50  TPro  7.3    /  Alb  3.1    /  TBili  1.1    /  DBili  x      /  AST  24     /  ALT  16     /  AlkPhos  116    2024 07:55  TPro  7.4    /  Alb  3.2    /  TBili  0.7    /  DBili  x      /  AST  25     /  ALT  17     /  AlkPhos  130    2024 19:16             EC Lead ECG:   Ventricular Rate 61 BPM    Atrial Rate 61 BPM    P-R Interval 432 ms    QRS Duration 174 ms    Q-T Interval 450 ms    QTC Calculation(Bazett) 453 ms    R Axis -50 degrees    T Axis 126 degrees    Diagnosis Line Sinus rhythm with 1st degree AV block  atrial-paced complexes  Right bundle branch block  Left anterior fascicular block  *** Bifascicular block ***  Left ventricular hypertrophy with repolarization abnormality ( R in aVL )    Confirmed by USHA BEE ([id number]) on 2024 7:51:43 AM (24 @ 16:56)      TRANSTHORACIC ECHOCARDIOGRAM REPORT  ________________________________________________________________________________                                      _______       Pt. Name:       ROSITA GUTIERREZ Study Date:    2024  MRN:            [id number]          YOB: 1950  Accession #:    [id number]         Age:           73 years  Account#:       [id number]        Gender:        F  Heart Rate:                       Height:        ( )  Rhythm:                           Weight:   220.46 lb (100.00 kg)  Blood Pressure: 160/61 mmHg       BSA/BMI:       2.15 m² /  ________________________________________________________________________________________  Referring Physician:    [id number] Lexx Cuenca  Interpreting Physician: Serafin Cooper  Primary Sonographer:    Bernabe Heredia    CPT:               ECHO TTE WO CON COMP W DOPP - 66853.m  Indication(s):     Dyspnea, unspecified - R06.00  Procedure:         Transthoracic echocardiogram with 2-D, M-mode and complete          spectral and color flow Doppler.  Ordering Location: [facility]  Admission Status:  Inpatient  Study Information: Image quality for this study is technically difficult.    _______________________________________________________________________________________     CONCLUSIONS:      1. Technically difficult image quality.   2. Left ventricular systolic function is mildly decreased with an ejection fraction visually estimated at 45 to 50 %. Global left ventricular hypokinesis.   3. Based on visualassessment, the right ventricle appears mildly enlarged. borderline reduced systolic function.   4. Device lead is visualized in the right heart.   5. The left atrium is moderately dilated.   6. The right atrium is dilated in size.   7. Symmetric mitral valve leaflet tethering.   8. Mild mitral regurgitation.   9. Trileaflet aortic valve with reduced systolic excursion. There is calcification of the aortic valve leaflets. moderate aortic stenosis.  10. The DEVYN is estimated at 1.1sqcm.  11. Moderate to severe tricuspid regurgitation.  12. Estimated pulmonary artery systolic pressure is 78 mmHg, consistent with severe pulmonary hypertension.  13. The inferior vena cava is dilated measuring 2.67 cm in diameter, (dilated >2.1cm) with abnormal inspiratory collapse (abnormal <50%) consistent with elevated right atrial pressure (~15, range 10-20mmHg).    ________________________________________________________________________________________  FINDINGS:     Left Ventricle:  Left ventricular systolic function is mildly decreased with an ejection fraction visually estimated at 45 to 50%. There is global left ventricular hypokinesis.     Right Ventricle:  Based on visual assessment, the right ventricle appears mildly enlarged. Borderline reducedsystolic function. Tricuspid annular plane systolic excursion (TAPSE) is 2.3 cm (normal >=1.7 cm). A device lead is visualized in the right heart.     Left Atrium:  The left atrium is moderately dilated with an indexed volume of 45.99 ml/m².     Right Atrium:  The right atrium is dilated in size.     Aortic Valve:  The aortic valve appears trileaflet with reduced systolic excursion. There is calcification of the aortic valve leaflets. There is moderate aortic stenosis. The peak transaortic velocity is 2.61 m/s, peak transaortic gradient is 27.2 mmHg and mean transaortic gradient is 13.0 mmHg with an LVOT/aortic valve VTI ratio of 0.31. The aortic valve area is estimated at 1.09 cm² by the continuity equation. The DEVYN is estimated at 1.1sqcm. There is trace aortic regurgitation.     Mitral Valve:  There is calcification of the mitral valve annulus. Mitral valve leaflets are diffusely calcified. There is symmetric leaflet tethering. There is mild mitral regurgitation.     Tricuspid Valve:  Structurally normal tricuspid valve with normal leaflet excursion. There is moderate to severe tricuspid regurgitation. Estimated pulmonary artery systolic pressure is 78 mmHg, consistent with severe pulmonary hypertension.     Pulmonic Valve:  The pulmonic valve was not well visualized. There is mild pulmonic regurgitation.     Aorta:  The aortic root at the sinuses of Valsalva is normal in size, measuring 3.00 cm (indexed 1.39 cm/m²). The ascending aorta diameter is normal in size, measuring 2.60 cm (indexed 1.21 cm/m²).     Systemic Veins:  The inferior vena cava is dilated measuring 2.67 cm in diameter, (dilated >2.1cm) with abnormal inspiratory collapse (abnormal <50%) consistent with elevated right atrial pressure (~15, range 10-20mmHg).  ____________________________________________________________________  QUANTITATIVE DATA:  Left Ventricle Measurements: (Indexed to BSA)     IVSd (2D):   1.4 cm  LVPWd (2D):  1.3 cm  LVIDd (2D):  5.4 cm  LVIDs (2D):  4.5 cm  LV Mass:     314 g  145.8g/m²  Visualized LV EF%: 45 to 50%     MV E Vmax:    1.71 m/s  MV A Vmax:    0.75 m/s  MV E/A:       2.27  e' lateral:   6.42 cm/s  e' medial:    5.87 cm/s  E/e' lateral: 26.64  E/e' medial:  29.13  E/e' Average: 27.83  MV DT:        161 msec    Aorta Measurements: (normal range) (Indexed to BSA)     Sinuses of Valsalva: 3.00 cm (2.7 - 3.3 cm)  Ao Asc prox:         2.60 cm       Left Atrium Measurements: (Indexed to BSA)  LA Diam 2D: 5.00 cm    Right Ventricle Measurements:     TAPSE:            2.3 cm  RV Base (RVID1):  4.3 cm  RV Mid (RVID2):   3.2 cm  RV Major (RVID3): 7.5 cm       LVOT / RVOT/ Qp/Qs Data: (Indexed to BSA)  LVOT Diameter: 2.10 cm  LVOT Vmax:     1.02 m/s  LVOT VTI:      17.80 cm  LVOT SV:       61.7 ml  28.61 ml/m²    Aortic Valve Measurements:  AV Vmax:                2.6 m/s  AV Peak Gradient:       27.2 mmHg  AV Mean Gradient:       13.0 mmHg  AV VTI:                 56.6 cm  AV VTI Ratio:           0.31  AoV EOA, Contin:        1.09 cm²  AoV EOA, Contin i:      0.51 cm²/m²  AoV Dimensionless Index 0.31    Mitral Valve Measurements:     MV E Vmax: 1.7 m/s         MR Vmax:          5.05 m/s  MV A Vmax: 0.8 m/s         MR Peak Gradient: 102.0 mmHg  MV E/A:    2.3       Tricuspid Valve Measurements:     TR Vmax:      4.0 m/s  TR Peak Gradient: 62.7 mmHg  RA Pressure:      15 mmHg  PASP:             78 mmHg    ________________________________________________________________________________________  Electronically signed on 2024 at 5:39:34 PM by Serafin Cooper         *** Final ***      Radiology:

## 2024-04-28 NOTE — PROGRESS NOTE ADULT - SUBJECTIVE AND OBJECTIVE BOX
Metabolic alkalosis with loop diuretics. H/o suggest volume overload. Will not give IVF NS. Monitor for now. May need Diamox if continue to worsen. Check urine Cl if worsens. Consult to follow in AM. Thank you

## 2024-04-29 ENCOUNTER — TRANSCRIPTION ENCOUNTER (OUTPATIENT)
Age: 74
End: 2024-04-29

## 2024-04-29 DIAGNOSIS — I87.2 VENOUS INSUFFICIENCY (CHRONIC) (PERIPHERAL): ICD-10-CM

## 2024-04-29 LAB
ALBUMIN SERPL ELPH-MCNC: 2.9 G/DL — LOW (ref 3.3–5)
ALP SERPL-CCNC: 97 U/L — SIGNIFICANT CHANGE UP (ref 40–120)
ALT FLD-CCNC: 14 U/L — SIGNIFICANT CHANGE UP (ref 12–78)
ANION GAP SERPL CALC-SCNC: 6 MMOL/L — SIGNIFICANT CHANGE UP (ref 5–17)
AST SERPL-CCNC: 14 U/L — LOW (ref 15–37)
BASOPHILS # BLD AUTO: 0.05 K/UL — SIGNIFICANT CHANGE UP (ref 0–0.2)
BASOPHILS NFR BLD AUTO: 1.1 % — SIGNIFICANT CHANGE UP (ref 0–2)
BILIRUB SERPL-MCNC: 0.8 MG/DL — SIGNIFICANT CHANGE UP (ref 0.2–1.2)
BUN SERPL-MCNC: 16 MG/DL — SIGNIFICANT CHANGE UP (ref 7–23)
CALCIUM SERPL-MCNC: 9.3 MG/DL — SIGNIFICANT CHANGE UP (ref 8.5–10.1)
CHLORIDE SERPL-SCNC: 96 MMOL/L — SIGNIFICANT CHANGE UP (ref 96–108)
CO2 SERPL-SCNC: 38 MMOL/L — HIGH (ref 22–31)
CREAT SERPL-MCNC: 0.75 MG/DL — SIGNIFICANT CHANGE UP (ref 0.5–1.3)
EGFR: 83 ML/MIN/1.73M2 — SIGNIFICANT CHANGE UP
EOSINOPHIL # BLD AUTO: 0.29 K/UL — SIGNIFICANT CHANGE UP (ref 0–0.5)
EOSINOPHIL NFR BLD AUTO: 6.4 % — HIGH (ref 0–6)
GLUCOSE SERPL-MCNC: 199 MG/DL — HIGH (ref 70–99)
HCT VFR BLD CALC: 39.3 % — SIGNIFICANT CHANGE UP (ref 34.5–45)
HGB BLD-MCNC: 12.2 G/DL — SIGNIFICANT CHANGE UP (ref 11.5–15.5)
IMM GRANULOCYTES NFR BLD AUTO: 0.2 % — SIGNIFICANT CHANGE UP (ref 0–0.9)
LYMPHOCYTES # BLD AUTO: 1.6 K/UL — SIGNIFICANT CHANGE UP (ref 1–3.3)
LYMPHOCYTES # BLD AUTO: 35.4 % — SIGNIFICANT CHANGE UP (ref 13–44)
MAGNESIUM SERPL-MCNC: 1.9 MG/DL — SIGNIFICANT CHANGE UP (ref 1.6–2.6)
MCHC RBC-ENTMCNC: 28.5 PG — SIGNIFICANT CHANGE UP (ref 27–34)
MCHC RBC-ENTMCNC: 31 GM/DL — LOW (ref 32–36)
MCV RBC AUTO: 91.8 FL — SIGNIFICANT CHANGE UP (ref 80–100)
MONOCYTES # BLD AUTO: 0.5 K/UL — SIGNIFICANT CHANGE UP (ref 0–0.9)
MONOCYTES NFR BLD AUTO: 11.1 % — SIGNIFICANT CHANGE UP (ref 2–14)
NEUTROPHILS # BLD AUTO: 2.07 K/UL — SIGNIFICANT CHANGE UP (ref 1.8–7.4)
NEUTROPHILS NFR BLD AUTO: 45.8 % — SIGNIFICANT CHANGE UP (ref 43–77)
NRBC # BLD: 0 /100 WBCS — SIGNIFICANT CHANGE UP (ref 0–0)
PHOSPHATE SERPL-MCNC: 3.7 MG/DL — SIGNIFICANT CHANGE UP (ref 2.5–4.5)
PLATELET # BLD AUTO: 151 K/UL — SIGNIFICANT CHANGE UP (ref 150–400)
POTASSIUM SERPL-MCNC: 3.5 MMOL/L — SIGNIFICANT CHANGE UP (ref 3.5–5.3)
POTASSIUM SERPL-SCNC: 3.5 MMOL/L — SIGNIFICANT CHANGE UP (ref 3.5–5.3)
PROT SERPL-MCNC: 6.8 G/DL — SIGNIFICANT CHANGE UP (ref 6–8.3)
RBC # BLD: 4.28 M/UL — SIGNIFICANT CHANGE UP (ref 3.8–5.2)
RBC # FLD: 15 % — HIGH (ref 10.3–14.5)
SODIUM SERPL-SCNC: 140 MMOL/L — SIGNIFICANT CHANGE UP (ref 135–145)
WBC # BLD: 4.52 K/UL — SIGNIFICANT CHANGE UP (ref 3.8–10.5)
WBC # FLD AUTO: 4.52 K/UL — SIGNIFICANT CHANGE UP (ref 3.8–10.5)

## 2024-04-29 PROCEDURE — 99221 1ST HOSP IP/OBS SF/LOW 40: CPT

## 2024-04-29 PROCEDURE — 99233 SBSQ HOSP IP/OBS HIGH 50: CPT | Mod: GC

## 2024-04-29 PROCEDURE — 99233 SBSQ HOSP IP/OBS HIGH 50: CPT

## 2024-04-29 RX ORDER — FUROSEMIDE 40 MG
40 TABLET ORAL DAILY
Refills: 0 | Status: DISCONTINUED | OUTPATIENT
Start: 2024-04-29 | End: 2024-04-30

## 2024-04-29 RX ORDER — LOSARTAN POTASSIUM 100 MG/1
75 TABLET, FILM COATED ORAL DAILY
Refills: 0 | Status: DISCONTINUED | OUTPATIENT
Start: 2024-04-29 | End: 2024-05-02

## 2024-04-29 RX ORDER — FUROSEMIDE 40 MG
40 TABLET ORAL ONCE
Refills: 0 | Status: COMPLETED | OUTPATIENT
Start: 2024-04-29 | End: 2024-04-29

## 2024-04-29 RX ORDER — LIDOCAINE 4 G/100G
1 CREAM TOPICAL DAILY
Refills: 0 | Status: DISCONTINUED | OUTPATIENT
Start: 2024-04-29 | End: 2024-04-30

## 2024-04-29 RX ORDER — CLOTRIMAZOLE AND BETAMETHASONE DIPROPIONATE 10; .5 MG/G; MG/G
1 CREAM TOPICAL DAILY
Refills: 0 | Status: DISCONTINUED | OUTPATIENT
Start: 2024-04-29 | End: 2024-05-02

## 2024-04-29 RX ADMIN — Medication 650 MILLIGRAM(S): at 22:00

## 2024-04-29 RX ADMIN — LOSARTAN POTASSIUM 50 MILLIGRAM(S): 100 TABLET, FILM COATED ORAL at 05:11

## 2024-04-29 RX ADMIN — ZOLPIDEM TARTRATE 5 MILLIGRAM(S): 10 TABLET ORAL at 22:00

## 2024-04-29 RX ADMIN — Medication 1 DROP(S): at 17:47

## 2024-04-29 RX ADMIN — Medication 2: at 17:46

## 2024-04-29 RX ADMIN — CARVEDILOL PHOSPHATE 12.5 MILLIGRAM(S): 80 CAPSULE, EXTENDED RELEASE ORAL at 17:47

## 2024-04-29 RX ADMIN — Medication 1 DROP(S): at 11:53

## 2024-04-29 RX ADMIN — Medication 7 UNIT(S): at 12:41

## 2024-04-29 RX ADMIN — Medication 1 DROP(S): at 11:11

## 2024-04-29 RX ADMIN — Medication 650 MILLIGRAM(S): at 05:09

## 2024-04-29 RX ADMIN — CARVEDILOL PHOSPHATE 12.5 MILLIGRAM(S): 80 CAPSULE, EXTENDED RELEASE ORAL at 05:11

## 2024-04-29 RX ADMIN — Medication 125 MICROGRAM(S): at 05:10

## 2024-04-29 RX ADMIN — INSULIN GLARGINE 23 UNIT(S): 100 INJECTION, SOLUTION SUBCUTANEOUS at 22:02

## 2024-04-29 RX ADMIN — Medication 650 MILLIGRAM(S): at 11:51

## 2024-04-29 RX ADMIN — Medication 40 MILLIGRAM(S): at 17:47

## 2024-04-29 RX ADMIN — Medication 650 MILLIGRAM(S): at 23:00

## 2024-04-29 RX ADMIN — Medication 7 UNIT(S): at 17:46

## 2024-04-29 RX ADMIN — LIDOCAINE 1 APPLICATION(S): 4 CREAM TOPICAL at 11:53

## 2024-04-29 RX ADMIN — Medication 7 UNIT(S): at 08:37

## 2024-04-29 RX ADMIN — Medication 2: at 08:38

## 2024-04-29 RX ADMIN — Medication 6: at 12:41

## 2024-04-29 RX ADMIN — Medication 650 MILLIGRAM(S): at 06:09

## 2024-04-29 RX ADMIN — Medication 250 MICROGRAM(S): at 05:10

## 2024-04-29 RX ADMIN — Medication 40 MILLIGRAM(S): at 12:40

## 2024-04-29 RX ADMIN — RIVAROXABAN 20 MILLIGRAM(S): KIT at 17:48

## 2024-04-29 NOTE — PROGRESS NOTE ADULT - PROBLEM SELECTOR PLAN 1
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)  - TTE 1/2024 LVEF 45-50%  - now with LE pitting edema, sob and orthopnea  - f/u TTE - pending  - continue home Coreg  - s/p Lasix 40mg IVP BID - given metolazone x 1 on 4/27 as a trial (with good effect)  - Increase losartan to 75mg QD for GDMT - can uptitrate - was on 75mg daily on DC but was told by follow up that this did not to be refilled - watch renal indices closely  - developed contraction alkalosis on loop diuretic  - d/c standing lasix, will add diamox if bicarb continues to worsen   - Bicarb improved a bit today - d/w cardio - will give Lasix 40 IVP x1 and reassess daily - needs IV diuretics  - pt w/ weeping LE wounds -- wound care RN consulted, Dr. Dumont currently away  - Cardio following, recs appreciated

## 2024-04-29 NOTE — DISCHARGE NOTE PROVIDER - CARE PROVIDERS DIRECT ADDRESSES
,[email or web address] ,[email or web address],DirectAddress_Unknown ,[email or web address],DirectAddress_Unknown,[email or web address] ,[email or web address],DirectAddress_Unknown,[email or web address]

## 2024-04-29 NOTE — DISCHARGE NOTE PROVIDER - NSDCFUSCHEDAPPT_GEN_ALL_CORE_FT
Zaire Browne  [facility]  WOUNDCARE  [place]  Scheduled Appointment: 04/30/2024

## 2024-04-29 NOTE — PROGRESS NOTE ADULT - TIME BILLING
activities including direct patient care, counseling and/or coordinating care, reviewing notes/lab data/imaging, and discussion with multidisciplinary team (excluding time spent on resident teaching)

## 2024-04-29 NOTE — DISCHARGE NOTE PROVIDER - NPI NUMBER (FOR SYSADMIN USE ONLY) :
[[id number]] [[id number]],[[id number]] [[id number]],[[id number]],[[id number]] [[id number]],[[id number]],[[id number]],[[id number]]

## 2024-04-29 NOTE — PROGRESS NOTE ADULT - ASSESSMENT
74 y/o Female w/ PMHx of HTN, Type 2 Diabetes, NICM/HFrEF, Afib/flutter s/p ablation, bradycardia/conduction disease s/p MDT dual-chamber PPM, pHTN, Hypothyroidism, Hyperparathyroidism presented to hospital c/o SOB; admitted for management of HFrEF exacerbation.     Outpatient Cards: Dr. Ellis    # NICM/HFrEF exacerbation  - Presented w/ SOB, orthopnea, LE edema w/ 40lb weight gain  - EKG: SR w/ 1st degree AVB, RBBB, LVH; trops negative; no e/o ischemia   - TTE 1/2024: LVEF 40-45%, global LV hypokinesis, moderate AS/TR, severe pHTN  - Repeat TTE ordered; pending results  - s/p IV Lasix; however w/ increase in Bicarb so 4/28PM and 4/29AM dose held. Bicarb improving w/ hold on diuresis; if worsening can give Diamox 250mg PO BID x48hrs  - GDMT: Coreg 12.5mg BID, Losartan 50mg qd  - Pending outpt recs from Dr. Ellis's office to determine if ischemic workup inpatient is warranted  - Monitor and replete lytes, keep K>4, Mg>2  - Daily weights, strict I&Os, fluid restriction    # Afib  - s/p ablation in past  - Rate controlled  - Continue Digoxin 250mcg, Coreg 12.5mg BID and Xarelto 20mg qhs  - Digoxin level 4/26: 1.5    # HTN  - BP remains suboptimal  - Started on Losartan 25mg 4/28; received additional dose of 25mg overnight  - Continue Losartan 50mg qd, Coreg 12.5mg BID -- augment PRN    Sanna Cortes PA-C  Cardiology  Available on teams       72 y/o Female w/ PMHx of HTN, Type 2 Diabetes, NICM/HFrEF, Afib/flutter s/p ablation, bradycardia/conduction disease s/p MDT dual-chamber PPM, pHTN, Hypothyroidism, Hyperparathyroidism presented to hospital c/o SOB; admitted for management of HFrEF exacerbation.     Outpatient Cards: Dr. Ellis    # NICM/HFrEF exacerbation  - Presented w/ SOB, orthopnea, LE edema w/ 40lb weight gain  - EKG: SR w/ 1st degree AVB, RBBB, LVH; trops negative; no e/o ischemia   - TTE 1/2024: LVEF 40-45%, global LV hypokinesis, moderate AS/TR, severe pHTN  - Repeat TTE ordered; pending results  - s/p IV Lasix; however w/ increase in Bicarb so 4/28PM and 4/29AM dose held. Bicarb improving w/ hold on diuresis; if worsening can give Diamox 250mg PO BID x48hrs  - GDMT: Coreg 12.5mg BID, Losartan 50mg qd  - Pending outpt recs from Dr. Ellis's office to determine if ischemic workup inpatient is warranted (request faxed to office)  - Monitor and replete lytes, keep K>4, Mg>2  - Daily weights, strict I&Os, fluid restriction    # Afib  - s/p ablation in past  - Rate controlled  - Continue Digoxin 250mcg, Coreg 12.5mg BID and Xarelto 20mg qhs  - Digoxin level 4/26: 1.5    # HTN  - BP remains suboptimal  - Started on Losartan 25mg 4/28; received additional dose of 25mg overnight  - Continue Losartan 50mg qd, Coreg 12.5mg BID -- augment PRN    Sanna Cortes PA-C  Cardiology  Available on teams       74 y/o Female w/ PMHx of HTN, Type 2 Diabetes, NICM/HFrEF, Afib/flutter s/p ablation, bradycardia/conduction disease s/p MDT dual-chamber PPM, pHTN, Hypothyroidism, Hyperparathyroidism presented to hospital c/o SOB; admitted for management of HFrEF exacerbation.     Outpatient Cards: Dr. Ellis    # NICM/HFrEF exacerbation  - Presented w/ SOB, orthopnea, LE edema w/ 40lb weight gain  - EKG: SR w/ 1st degree AVB, RBBB, LVH; trops negative; no e/o ischemia   - TTE 1/2024: LVEF 40-45%, global LV hypokinesis, moderate AS/TR, severe pHTN  - Repeat TTE ordered; pending results  - s/p IV Lasix; however w/ increase in Bicarb so 4/28PM and 4/29AM dose held. Bicarb improving w/ hold on diuresis. Restart Lasix 40mg PO qd; if worsening can give Diamox 250mg PO BID x48hrs  - GDMT: Coreg 12.5mg BID, Losartan 50mg qd  - Pending outpt recs from Dr. Ellis's office to determine if ischemic workup inpatient is warranted (request faxed to office)  - Monitor and replete lytes, keep K>4, Mg>2  - Daily weights, strict I&Os, fluid restriction    # Afib  - s/p ablation in past  - Rate controlled  - Continue Digoxin 250mcg, Coreg 12.5mg BID and Xarelto 20mg qhs  - Digoxin level 4/26: 1.5    # HTN  - BP remains suboptimal  - Started on Losartan 25mg 4/28; received additional dose of 25mg overnight  - Continue Losartan 50mg qd, Coreg 12.5mg BID -- augment PRN    aSnna Cortes PA-C  Cardiology  Available on teams       74 y/o Female w/ PMHx of HTN, Type 2 Diabetes, NICM/HFrEF, Afib/flutter s/p ablation 3/8/12, bradycardia/conduction disease s/p MDT dual-chamber PPM 3/8/12, pHTN, Hypothyroidism, Hyperparathyroidism presented to hospital c/o SOB; admitted for management of HFrEF exacerbation.     Outpatient Cards: Dr. Ellis    # NICM/HFrEF exacerbation  - Presented w/ SOB, orthopnea, LE edema w/ 40lb weight gain  - EKG: SR w/ 1st degree AVB, RBBB, LVH; trops negative; no e/o ischemia   - TTE 1/2024: LVEF 40-45%, global LV hypokinesis, moderate AS/TR, severe pHTN  - TTE 2/3/24: LVEF 40%, dilated RA/LA, dilated RV/LV, LVH, PPM/AICD in RA and RV, moderate segmental LV dysfxn w/ overall LVSF mildly reduced, trace AI, mod MR/TR, mild PI, severe pHTN  - Repeat TTE ordered; pending results  - PPM interrogation 2/29/24: MVPR (AAIR > DDR ); normal device fxn, adequate pacing and sensing thresholds; estimated battery life 9.3yrs  - s/p IV Lasix; however w/ increase in Bicarb so 4/28PM and 4/29AM dose held. Bicarb improving w/ hold on diuresis. Restart Lasix 40mg PO qd; if worsening can give Diamox 250mg PO BID x48hrs  - GDMT: Coreg 12.5mg BID, Losartan 50mg qd  - No ischemic eval outpatient per records obtained from Dr. Ellis's office  - Monitor and replete lytes, keep K>4, Mg>2  - Daily weights, strict I&Os, fluid restriction    # Afib  - s/p ablation 3/8/12  - Rate controlled  - Continue Digoxin 250mcg, Coreg 12.5mg BID and Xarelto 20mg qhs  - Digoxin level 4/26: 1.5    # HTN  - BP remains suboptimal  - Started on Losartan 25mg 4/28; received additional dose of 25mg overnight  - Continue Losartan 50mg qd, Coreg 12.5mg BID -- augment PRN    Sanna Cortes PA-C  Cardiology  Available on teams

## 2024-04-29 NOTE — DISCHARGE NOTE PROVIDER - HOSPITAL COURSE
HPI:  73 year old female with a past medical history of atrial fibrillation on Xarelto, bradycardia status post pacemaker, hypertension, diabetes, cardiomyopathy, pulmonary hypertension is presenting with lower extremity edema.  States that this similar presentation happened to her previously when she was admitted to the hospital.  She states her LE edema improved after discharge in January but then has been worsening for the past month. She endorse some shortness of breath and difficulty laying flat as well. with her symptoms.  Denies any chest pain.  States that she saw her endocrinologist and was told she had gained 35 pounds in water weight due to the swelling in her legs. She states her legs do feel uncomfortable States that ambulation has been more difficult for her recently due to her symptoms.   She states denies any other complaints. She follows with Cardio Dr. Yvan Ellis. Last TTE in 1/2024 showed EF 45-50%.     ED course:   Vitals: T 98.5 HR 66 /104   Labs: alk phosph 130 proBNP 1034   Given: 40 mg IV lasix  Imaging:   cxray : mild CHF at this time   LE US: no DVT  (25 Apr 2024 21:47)      ---  HOSPITAL COURSE:   73 year old female with a past medical history of atrial fibrillation on Xarelto, bradycardia status post pacemaker, hypertension, diabetes, cardiomyopathy, pulmonary hypertension is presenting with lower extremity edema. Admit for acute decompensated congestive heart failure. TTE 1/2024 LVEF 45-50%. Pt treated with IV diuretics. Increased losartan to 50mg QD for GDMT - can uptitrate - was on 75mg daily on DC but was told by follow up that this did not to be refilled. Pt developed contraction alkalosis on loop diuretic, which was then held.   Pt w/ weeping LE wounds, which were evaluated by Dr. Dumont (wound care).          Patient is stable for discharge as per primary medical team and consultants.    PT consulted, recommends discharge ______    Patient showed improvement throughout hospitalization. Patient was seen and examined on day of discharge. Patient was medically optimized for discharge with close outpatient follow up.        ---  CONSULTANTS:   Cardio: Dr. Gonzalez  Wound Care: Dr. Dumont    ---  TIME SPENT:  I, the attending physician, was physically present for the key portions of the evaluation and management (E/M) service provided. The total amount of time spent reviewing the hospital notes, laboratory values, imaging findings, assessing/counseling the patient, discussing with consultant physicians, social work, nursing staff was 50 minutes   HPI:  73 year old female with a past medical history of atrial fibrillation on Xarelto, bradycardia status post pacemaker, hypertension, diabetes, cardiomyopathy, pulmonary hypertension is presenting with lower extremity edema.  States that this similar presentation happened to her previously when she was admitted to the hospital.  She states her LE edema improved after discharge in January but then has been worsening for the past month. She endorse some shortness of breath and difficulty laying flat as well. with her symptoms.  Denies any chest pain.  States that she saw her endocrinologist and was told she had gained 35 pounds in water weight due to the swelling in her legs. She states her legs do feel uncomfortable States that ambulation has been more difficult for her recently due to her symptoms.   She states denies any other complaints. She follows with Cardio Dr. Yvan Ellis. Last TTE in 1/2024 showed EF 45-50%.     ED course:   Vitals: T 98.5 HR 66 /104   Labs: alk phosph 130 proBNP 1034   Given: 40 mg IV lasix  Imaging:   cxray : mild CHF at this time   LE US: no DVT  (25 Apr 2024 21:47)      ---  HOSPITAL COURSE:   73 year old female with a past medical history of atrial fibrillation on Xarelto, bradycardia status post pacemaker, hypertension, diabetes, cardiomyopathy, pulmonary hypertension is presenting with lower extremity edema. Admit for acute decompensated congestive heart failure. TTE 1/2024 LVEF 45-50%. Pt treated with IV diuretics. Her losartan was increased for HTN. Pt developed contraction alkalosis on loop diuretic, which was then held. Pt was given acetazolamide. Pt w/ weeping LE wounds, which were evaluated by wound care.  TTE ____        Patient is stable for discharge as per primary medical team and consultants.    PT consulted, recommends discharge home with home PT and rolling walker    Patient showed improvement throughout hospitalization. Patient was seen and examined on day of discharge. Patient was medically optimized for discharge with close outpatient follow up.    PE on day of discharge:  Vital Signs Last 24 Hrs  T(C): 36.5 (02 May 2024 05:02), Max: 36.8 (01 May 2024 16:59)  T(F): 97.7 (02 May 2024 05:02), Max: 98.3 (01 May 2024 16:59)  HR: 60 (02 May 2024 05:02) (60 - 62)  BP: 165/76 (02 May 2024 05:02) (165/76 - 179/81)  BP(mean): --  RR: 19 (02 May 2024 05:02) (18 - 19)  SpO2: 93% (02 May 2024 05:02) (90% - 93%)    Parameters below as of 02 May 2024 05:02  Patient On (Oxygen Delivery Method): room air    GENERAL: NAD, appears comfortable  HEENT:  anicteric, moist mucous membranes; poor dentition  CHEST/LUNG:  decreased breath sounds b/l  HEART:  RRR, S1, S2  ABDOMEN:  BS+, soft, nontender, nondistended  EXTREMITIES: 2+ pitting LE edema bilaterally, legs wrapped in bandage  NERVOUS SYSTEM: answers questions and follows commands appropriately      ---  CONSULTANTS:   Cardio: Dr. Gonzalez  Nephro: Dr. Sanderson    ---  TIME SPENT:  I, the attending physician, was physically present for the key portions of the evaluation and management (E/M) service provided. The total amount of time spent reviewing the hospital notes, laboratory values, imaging findings, assessing/counseling the patient, discussing with consultant physicians, social work, nursing staff was 50 minutes   HPI:  73 year old female with a past medical history of atrial fibrillation on Xarelto, bradycardia status post pacemaker, hypertension, diabetes, cardiomyopathy, pulmonary hypertension is presenting with lower extremity edema.  States that this similar presentation happened to her previously when she was admitted to the hospital.  She states her LE edema improved after discharge in January but then has been worsening for the past month. She endorse some shortness of breath and difficulty laying flat as well. with her symptoms.  Denies any chest pain.  States that she saw her endocrinologist and was told she had gained 35 pounds in water weight due to the swelling in her legs. She states her legs do feel uncomfortable States that ambulation has been more difficult for her recently due to her symptoms.   She states denies any other complaints. She follows with Cardio Dr. Yvan Ellis. Last TTE in 1/2024 showed EF 45-50%.     ED course:   Vitals: T 98.5 HR 66 /104   Labs: alk phosph 130 proBNP 1034   Given: 40 mg IV lasix  Imaging:   cxray : mild CHF at this time   LE US: no DVT  (25 Apr 2024 21:47)      ---  HOSPITAL COURSE:   73 year old female with a past medical history of atrial fibrillation on Xarelto, bradycardia status post pacemaker, hypertension, diabetes, cardiomyopathy, pulmonary hypertension is presenting with lower extremity edema. Admit for acute decompensated congestive heart failure. TTE 1/2024 LVEF 45-50%. Pt treated with IV diuretics. Her losartan was increased for HTN. Pt developed contraction alkalosis on loop diuretic, which was then held. Pt was given acetazolamide. Pt w/ weeping LE wounds, which were evaluated by wound care.  Pt refused TTE. Pt clinically improved. Patient is stable for discharge as per primary medical team and consultants.    PT consulted, recommends discharge home with home PT and rolling walker    Patient showed improvement throughout hospitalization. Patient was seen and examined on day of discharge. Patient was medically optimized for discharge with close outpatient follow up.    PE on day of discharge:  Vital Signs Last 24 Hrs  T(C): 36.5 (02 May 2024 05:02), Max: 36.8 (01 May 2024 16:59)  T(F): 97.7 (02 May 2024 05:02), Max: 98.3 (01 May 2024 16:59)  HR: 60 (02 May 2024 05:02) (60 - 62)  BP: 165/76 (02 May 2024 05:02) (165/76 - 179/81)  BP(mean): --  RR: 19 (02 May 2024 05:02) (18 - 19)  SpO2: 93% (02 May 2024 05:02) (90% - 93%)    Parameters below as of 02 May 2024 05:02  Patient On (Oxygen Delivery Method): room air    GENERAL: NAD, appears comfortable  HEENT:  anicteric, moist mucous membranes; poor dentition  CHEST/LUNG:  decreased breath sounds b/l  HEART:  RRR, S1, S2  ABDOMEN:  BS+, soft, nontender, nondistended  EXTREMITIES: 2+ pitting LE edema bilaterally, legs wrapped in bandage  NERVOUS SYSTEM: answers questions and follows commands appropriately      ---  CONSULTANTS:   Cardio: Dr. Gonzalez  Nephro: Dr. Sanderson    ---  TIME SPENT:  I, the attending physician, was physically present for the key portions of the evaluation and management (E/M) service provided. The total amount of time spent reviewing the hospital notes, laboratory values, imaging findings, assessing/counseling the patient, discussing with consultant physicians, social work, nursing staff was 50 minutes   HPI:  73 year old female with a past medical history of atrial fibrillation on Xarelto, bradycardia status post pacemaker, hypertension, diabetes, cardiomyopathy, pulmonary hypertension is presenting with lower extremity edema.  States that this similar presentation happened to her previously when she was admitted to the hospital.  She states her LE edema improved after discharge in January but then has been worsening for the past month. She endorse some shortness of breath and difficulty laying flat as well. with her symptoms.  Denies any chest pain.  States that she saw her endocrinologist and was told she had gained 35 pounds in water weight due to the swelling in her legs. She states her legs do feel uncomfortable States that ambulation has been more difficult for her recently due to her symptoms.   She states denies any other complaints. She follows with Cardio Dr. Yvan Ellis. Last TTE in 1/2024 showed EF 45-50%.     ED course:   Vitals: T 98.5 HR 66 /104   Labs: alk phosph 130 proBNP 1034   Given: 40 mg IV lasix  Imaging:   cxray : mild CHF at this time   LE US: no DVT  (25 Apr 2024 21:47)      ---  HOSPITAL COURSE:   73 year old female with a past medical history of atrial fibrillation on Xarelto, bradycardia status post pacemaker, hypertension, diabetes, cardiomyopathy, pulmonary hypertension is presenting with lower extremity edema. Admit for acute decompensated congestive heart failure. TTE 1/2024 LVEF 45-50%. Pt treated with IV diuretics. Her losartan was increased for HTN. Pt developed contraction alkalosis on loop diuretic, which was then held. Pt was given acetazolamide. Pt w/ weeping LE wounds, which were evaluated by wound care.  Pt refused TTE. Pt clinically improved. Patient is stable for discharge as per primary medical team and consultants.    PT consulted, recommends discharge home with home PT and rolling walker    Patient showed improvement throughout hospitalization. Patient was seen and examined on day of discharge. Patient was medically optimized for discharge with close outpatient follow up.    Continue lasix 40mg daily at home.     PE on day of discharge:  Vital Signs Last 24 Hrs  T(C): 36.5 (02 May 2024 05:02), Max: 36.8 (01 May 2024 16:59)  T(F): 97.7 (02 May 2024 05:02), Max: 98.3 (01 May 2024 16:59)  HR: 60 (02 May 2024 05:02) (60 - 62)  BP: 165/76 (02 May 2024 05:02) (165/76 - 179/81)  BP(mean): --  RR: 19 (02 May 2024 05:02) (18 - 19)  SpO2: 93% (02 May 2024 05:02) (90% - 93%)    Parameters below as of 02 May 2024 05:02  Patient On (Oxygen Delivery Method): room air    GENERAL: NAD, appears comfortable  HEENT:  anicteric, moist mucous membranes; poor dentition  CHEST/LUNG:  decreased breath sounds b/l  HEART:  RRR, S1, S2  ABDOMEN:  BS+, soft, nontender, nondistended  EXTREMITIES: 1+ pitting LE edema bilaterally, legs wrapped in bandage  NERVOUS SYSTEM: answers questions and follows commands appropriately    ---  CONSULTANTS:   Cardio: Dr. Gonzalez  Nephro: Dr. Jagdeep Dockery NP: Pat   Palliative     ---  TIME SPENT:  I, the attending physician, was physically present for the key portions of the evaluation and management (E/M) service provided. The total amount of time spent reviewing the hospital notes, laboratory values, imaging findings, assessing/counseling the patient, discussing with consultant physicians, social work, nursing staff was 50 minutes

## 2024-04-29 NOTE — CONSULT NOTE ADULT - ASSESSMENT
Metabolic Alkalosis  CKD 2  Edema  HTN  CHF    -Metabolic Alkalosis likely from diuretic and contraction  -Started on losartan  -Currently off diuretic, no renal objection to restarting diuretic  -Alkalosis improving  -Cardiology note reviewed

## 2024-04-29 NOTE — DISCHARGE NOTE PROVIDER - CARE PROVIDER_API CALL
Lisa Pisano.  Family Medicine  [address] [facility], [address]  [place] [address]  Phone: [phone]  Fax: [phone]  Established Patient  Follow Up Time: 1 week   Lisa Pisano.  Family Medicine  [address] [facility], [address]  [place] [address]  Phone: [phone]  Fax: [phone]  Established Patient  Follow Up Time: 1 week    Funmilayo Cunha  Dental Medicine  [address] [place] [address], [place] [address]  Phone: [phone]  Fax: [phone]  Follow Up Time: Routine   Lisa Pisano  Family Medicine  [address] [facility], [address]  [place] [address]  Phone: [phone]  Fax: [phone]  Established Patient  Follow Up Time: 1 week    Funmilayo Cunha  Dental Medicine  [address] [place] [address],  [place] [address]  Phone: [phone]  Fax: [phone]  Follow Up Time: Routine    Yvan Ellis  Cardiovascular Disease  [address] [facility] [place] [address]  Phone: [phone]  Fax: [phone]  Established Patient  Follow Up Time: 1 week   Lisa Pisano  Family Medicine  [address] [facility], [address]  [place] [address]  Phone: [phone]  Fax: [phone]  Established Patient  Follow Up Time: 1 week    Funmilayo Cunha  Dental Medicine  [address] [place] [address],  [place] [address]  Phone: [phone]  Fax: [phone]  Follow Up Time: Routine    Yvan Ellis  Cardiovascular Disease  [address] [facility] [place] [address]  Phone: [phone]  Fax: [phone]  Established Patient  Follow Up Time: 1 week    Zaire Browne  Wound Care  [address] [place] [address]  Phone: [phone]  Fax: [phone]  Follow Up Time: 2 weeks

## 2024-04-29 NOTE — PROGRESS NOTE ADULT - PROBLEM SELECTOR PLAN 6
-chronic  - home med: xanax 0.25mg prn daily  - will continue prn xanax  - clearly has some adjustment/depressed mood - pt declining psych consult for now as she does not want extra medications - states that she would be more amenable for a therapist.  - d/w CM to try to arrange for service dog visit during week

## 2024-04-29 NOTE — CONSULT NOTE ADULT - SUBJECTIVE AND OBJECTIVE BOX
Chief Complaint: Venous ulcer of lower extremities.    HPI: Patient is known to me from Wound care, has long standing history of venous ulcers, admitted with CHF.     PAST MEDICAL & SURGICAL HISTORY:  Hypothyroid      DM (diabetes mellitus)      HTN (hypertension)      Atrial fibrillation and flutter  s/p ablation      Bradycardia  s/p MDT PPM      H/O pulmonary hypertension      H/O hyperparathyroidism      H/O cardiomyopathy      Cardiac pacemaker      H/O cardiac radiofrequency ablation      H/O carpal tunnel repair      History of parathyroid surgery          Allergies    penicillin (Short breath; Rash)  statins (Vomiting)  erythromycin (Rash)  Synjardy (Other (Severe))  nystatin topical (Hives; Rash)  vancomycin (Short breath; Rash)    Intolerances        MEDICATIONS  (STANDING):  artificial tears (preservative free) Ophthalmic Solution 1 Drop(s) Both EYES every 6 hours  carvedilol 12.5 milliGRAM(s) Oral every 12 hours  dextrose 10% Bolus 125 milliLiter(s) IV Bolus once  dextrose 5%. 1000 milliLiter(s) (50 mL/Hr) IV Continuous <Continuous>  dextrose 5%. 1000 milliLiter(s) (100 mL/Hr) IV Continuous <Continuous>  dextrose 50% Injectable 25 Gram(s) IV Push once  dextrose 50% Injectable 12.5 Gram(s) IV Push once  digoxin     Tablet 250 MICROGram(s) Oral daily  glucagon  Injectable 1 milliGRAM(s) IntraMuscular once  insulin glargine Injectable (LANTUS) 23 Unit(s) SubCutaneous at bedtime  insulin lispro (ADMELOG) corrective regimen sliding scale   SubCutaneous at bedtime  insulin lispro (ADMELOG) corrective regimen sliding scale   SubCutaneous three times a day before meals  insulin lispro Injectable (ADMELOG) 7 Unit(s) SubCutaneous three times a day before meals  levothyroxine 125 MICROGram(s) Oral daily  lidocaine 4% Cream 1 Application(s) Topical daily  losartan 75 milliGRAM(s) Oral daily  rivaroxaban 20 milliGRAM(s) Oral with dinner    MEDICATIONS  (PRN):  acetaminophen     Tablet .. 650 milliGRAM(s) Oral every 6 hours PRN Temp greater or equal to 38C (100.4F), Mild Pain (1 - 3)  ALPRAZolam 0.25 milliGRAM(s) Oral daily PRN anxiety  aluminum hydroxide/magnesium hydroxide/simethicone Suspension 30 milliLiter(s) Oral every 4 hours PRN Dyspepsia  dextrose Oral Gel 15 Gram(s) Oral once PRN Blood Glucose LESS THAN 70 milliGRAM(s)/deciliter  melatonin 3 milliGRAM(s) Oral at bedtime PRN Insomnia  ondansetron Injectable 4 milliGRAM(s) IV Push every 8 hours PRN Nausea and/or Vomiting  zolpidem 5 milliGRAM(s) Oral at bedtime PRN Insomnia      FAMILY HISTORY:  No pertinent family history in first degree relatives            ROS:  CONSTITUTIONAL: No fever, weight loss, or fatigue  EYES: No eye pain, visual disturbances, or discharge  ENMT:  No difficulty hearing, tinnitus, vertigo; No sinus or throat pain  NECK: No pain or stiffness  BREASTS: No pain, masses, or nipple discharge  RESPIRATORY: No cough, wheezing, chills or hemoptysis; No shortness of breath  CARDIOVASCULAR: No chest pain, palpitations, dizziness, or leg swelling  GASTROINTESTINAL: No abdominal or epigastric pain. No nausea, vomiting, or hematemesis; No diarrhea or constipation. No melena or hematochezia.  GENITOURINARY: No dysuria, frequency, hematuria, or incontinence  NEUROLOGICAL: No headaches, memory loss, loss of strength, numbness, or tremors  SKIN: No itching, burning, rashes, or lesions   LYMPH NODES: No enlarged glands  ENDOCRINE: No heat or cold intolerance; No hair loss  MUSCULOSKELETAL: No joint pain or swelling; No muscle, back, or extremity pain  PSYCHIATRIC: No depression, anxiety, mood swings, or difficulty sleeping  HEME/LYMPH: No easy bruising, or bleeding gums  ALLERGY AND IMMUNOLOGIC: No hives or eczema    PHYSICAL EXAM-      Vital Signs Last 24 Hrs  T(C): 36.9 (29 Apr 2024 11:42), Max: 36.9 (29 Apr 2024 11:42)  T(F): 98.4 (29 Apr 2024 11:42), Max: 98.4 (29 Apr 2024 11:42)  HR: 64 (29 Apr 2024 11:42) (60 - 64)  BP: 159/76 (29 Apr 2024 11:42) (159/76 - 176/74)  BP(mean): --  RR: 18 (29 Apr 2024 11:42) (18 - 18)  SpO2: 91% (29 Apr 2024 11:42) (91% - 92%)    Parameters below as of 29 Apr 2024 11:42  Patient On (Oxygen Delivery Method): room air        Constitutional: well developed, well nourished, no apparent distress, alert, oriented x 3.  Neck: Supple   Pulmonary: no respiratory distress, normal respiratory rhythm and effort, lungs are clear to auscultation/percussion. No CVA tenderness.  Cardiovascular: heart rate normal, normal sinus rhythm; no murmurs, gallops, rubs, heaves or thrills   Abdomen: soft, non-tender, +BS, no guarding/rebound/rigidity.  Vascular: Lower extremities are well perfused.   Extremities: Bilateral chronic moderate edema.   Skin: Right leg with no ulcer, has venous discoloration, left leg with few superficial ulcers, minimal weeping, no acute infection, no cellulitis, has venous discoloration.                            12.2   4.52  )-----------( 151      ( 29 Apr 2024 06:05 )             39.3     04-29    140  |  96  |  16  ----------------------------<  199<H>  3.5   |  38<H>  |  0.75    Ca    9.3      29 Apr 2024 06:05  Phos  3.7     04-29  Mg     1.9     04-29    TPro  6.8  /  Alb  2.9<L>  /  TBili  0.8  /  DBili  x   /  AST  14<L>  /  ALT  14  /  AlkPhos  97  04-29      Radiology:

## 2024-04-29 NOTE — DISCHARGE NOTE PROVIDER - PROVIDER TOKENS
PROVIDER:[TOKEN:[[id number]],FOLLOWUP:[1 week],ESTABLISHEDPATIENT:[T]] PROVIDER:[TOKEN:[[id number]],FOLLOWUP:[1 week],ESTABLISHEDPATIENT:[T]],PROVIDER:[TOKEN:[[id number]],FOLLOWUP:[Routine]] PROVIDER:[TOKEN:[[id number]],FOLLOWUP:[1 week],ESTABLISHEDPATIENT:[T]],PROVIDER:[TOKEN:[[id number]],FOLLOWUP:[Routine]],PROVIDER:[[id number]],FOLLOWUP:[1 week],ESTABLISHEDPATIENT:[T]] PROVIDER:[TOKEN:[[id number]],FOLLOWUP:[1 week],ESTABLISHEDPATIENT:[T]],PROVIDER:[TOKEN:[[id number]],FOLLOWUP:[Routine]],PROVIDER:[TOKEN:[[id number]],FOLLOWUP:[1 week],ESTABLISHEDPATIENT:[T]],PROVIDER:[TOKEN:[[id number]],FOLLOWUP:[2 weeks]]

## 2024-04-29 NOTE — PROGRESS NOTE ADULT - SUBJECTIVE AND OBJECTIVE BOX
[facility] -- Josue Moore, Lisa, Kimberli Cooper, , Noelle Fry  Office # [phone]    Follow Up:  CHF    Subjective/Observations: Patient seen and assessed at bedside. No events overnights. Endorses feeling well. Denies chest pain, palpitations, SOB/SALCIDO, or any other complaints.     REVIEW OF SYSTEMS: All other review of systems is negative unless indicated above  PAST MEDICAL & SURGICAL HISTORY:  Hypothyroid      DM (diabetes mellitus)      HTN (hypertension)      Atrial fibrillation and flutter  s/p ablation      Bradycardia  s/p MDT PPM      H/O pulmonary hypertension      H/O hyperparathyroidism      H/O cardiomyopathy      Cardiac pacemaker      H/O cardiac radiofrequency ablation      H/O carpal tunnel repair      History of parathyroid surgery        MEDICATIONS  (STANDING):  artificial tears (preservative free) Ophthalmic Solution 1 Drop(s) Both EYES every 6 hours  carvedilol 12.5 milliGRAM(s) Oral every 12 hours  dextrose 10% Bolus 125 milliLiter(s) IV Bolus once  dextrose 5%. 1000 milliLiter(s) (50 mL/Hr) IV Continuous <Continuous>  dextrose 5%. 1000 milliLiter(s) (100 mL/Hr) IV Continuous <Continuous>  dextrose 50% Injectable 25 Gram(s) IV Push once  dextrose 50% Injectable 12.5 Gram(s) IV Push once  digoxin     Tablet 250 MICROGram(s) Oral daily  glucagon  Injectable 1 milliGRAM(s) IntraMuscular once  insulin glargine Injectable (LANTUS) 23 Unit(s) SubCutaneous at bedtime  insulin lispro (ADMELOG) corrective regimen sliding scale   SubCutaneous at bedtime  insulin lispro (ADMELOG) corrective regimen sliding scale   SubCutaneous three times a day before meals  insulin lispro Injectable (ADMELOG) 7 Unit(s) SubCutaneous three times a day before meals  levothyroxine 125 MICROGram(s) Oral daily  lidocaine 4% Cream 1 Application(s) Topical daily  losartan 50 milliGRAM(s) Oral daily  rivaroxaban 20 milliGRAM(s) Oral with dinner    MEDICATIONS  (PRN):  acetaminophen     Tablet .. 650 milliGRAM(s) Oral every 6 hours PRN Temp greater or equal to 38C (100.4F), Mild Pain (1 - 3)  ALPRAZolam 0.25 milliGRAM(s) Oral daily PRN anxiety  aluminum hydroxide/magnesium hydroxide/simethicone Suspension 30 milliLiter(s) Oral every 4 hours PRN Dyspepsia  dextrose Oral Gel 15 Gram(s) Oral once PRN Blood Glucose LESS THAN 70 milliGRAM(s)/deciliter  melatonin 3 milliGRAM(s) Oral at bedtime PRN Insomnia  ondansetron Injectable 4 milliGRAM(s) IV Push every 8 hours PRN Nausea and/or Vomiting  zolpidem 5 milliGRAM(s) Oral at bedtime PRN Insomnia    Allergies    penicillin (Short breath; Rash)  statins (Vomiting)  erythromycin (Rash)  Synjardy (Other (Severe))  nystatin topical (Hives; Rash)  vancomycin (Short breath; Rash)    Intolerances      Vital Signs Last 24 Hrs  T(C): 36.6 (29 Apr 2024 04:40), Max: 37.3 (28 Apr 2024 11:35)  T(F): 97.8 (29 Apr 2024 04:40), Max: 99.1 (28 Apr 2024 11:35)  HR: 60 (29 Apr 2024 04:40) (60 - 63)  BP: 161/77 (29 Apr 2024 04:40) (146/66 - 176/74)  BP(mean): --  RR: 18 (29 Apr 2024 04:40) (18 - 18)  SpO2: 91% (29 Apr 2024 04:40) (91% - 93%)    Parameters below as of 29 Apr 2024 04:40  Patient On (Oxygen Delivery Method): room air      I&O's Summary    28 Apr 2024 07:01  -  29 Apr 2024 07:00  --------------------------------------------------------  IN: 0 mL / OUT: 200 mL / NET: -200 mL        PHYSICAL EXAM:  TELE: A-paced; underlying rhythm SR  Constitutional: NAD, awake and alert, well-developed  HEENT: Moist Mucous Membranes, Anicteric  Pulmonary: Non-labored, bibasilar rales  Cardiovascular: Regular, S1 and S2, No murmurs, rubs, gallops or clicks  Gastrointestinal: Bowel Sounds present, soft, nontender.   Lymph: 2+ pitting edema b/l legs up to knees; No lymphadenopathy.  Skin: No visible rashes or ulcers.  Psych:  Mood & affect appropriate  LABS: All Labs Reviewed:                        12.2   4.52  )-----------( 151      ( 29 Apr 2024 06:05 )             39.3                         11.8   4.03  )-----------( 140      ( 28 Apr 2024 06:50 )             38.3                         12.2   4.20  )-----------( 141      ( 27 Apr 2024 06:49 )             39.5     29 Apr 2024 06:05    140    |  96     |  16     ----------------------------<  199    3.5     |  38     |  0.75   28 Apr 2024 06:50    141    |  96     |  18     ----------------------------<  223    3.5     |  41     |  0.79   27 Apr 2024 06:49    140    |  96     |  15     ----------------------------<  173    3.5     |  36     |  0.81     Ca    9.3        29 Apr 2024 06:05  Ca    9.2        28 Apr 2024 06:50  Ca    8.6        27 Apr 2024 06:49  Phos  3.7       29 Apr 2024 06:05  Phos  3.9       28 Apr 2024 06:50  Mg     1.9       29 Apr 2024 06:05  Mg     1.7       28 Apr 2024 06:50    TPro  6.8    /  Alb  2.9    /  TBili  0.8    /  DBili  x      /  AST  14     /  ALT  14     /  AlkPhos  97     29 Apr 2024 06:05  TPro  7.0    /  Alb  3.0    /  TBili  1.0    /  DBili  x      /  AST  17     /  ALT  12     /  AlkPhos  104    28 Apr 2024 06:50          12 Lead ECG:   Ventricular Rate 61 BPM    Atrial Rate 61 BPM    P-R Interval 432 ms    QRS Duration 174 ms    Q-T Interval 450 ms    QTC Calculation(Bazett) 453 ms    R Axis -50 degrees    T Axis 126 degrees    Diagnosis Line Sinus rhythm with 1st degree AV block  atrial-paced complexes  Right bundle branch block  Left anterior fascicular block  *** Bifascicular block ***  Left ventricular hypertrophy with repolarization abnormality ( R in aVL )    Confirmed by USHA BEE ([id number]) on 4/26/2024 7:51:43 AM (04-25-24 @ 16:56)      TRANSTHORACIC ECHOCARDIOGRAM REPORT  ________________________________________________________________________________                                      _______       Pt. Name:       ROSITA GUTIERREZ Study Date:    1/22/2024  MRN:            [id number]          YOB: 1950  Accession #:    [id number]         Age:           73 years  Account#:       [id number]        Gender:        F  Heart Rate:                       Height:        ( )  Rhythm:                           Weight:   220.46 lb (100.00 kg)  Blood Pressure: 160/61 mmHg       BSA/BMI:       2.15 m² /  ________________________________________________________________________________________  Referring Physician:    [id number] Lexx Cuenca  Interpreting Physician: Serafin Cooper  Primary Sonographer:    Bernabe Heredia    CPT:               ECHO TTE WO CON COMP W DOPP - 55108.m  Indication(s):     Dyspnea, unspecified - R06.00  Procedure:         Transthoracic echocardiogram with 2-D, M-mode and complete          spectral and color flow Doppler.  Ordering Location: [facility]  Admission Status:  Inpatient  Study Information: Image quality for this study is technically difficult.    _______________________________________________________________________________________     CONCLUSIONS:      1. Technically difficult image quality.   2. Left ventricular systolic function is mildly decreased with an ejection fraction visually estimated at 45 to 50 %. Global left ventricular hypokinesis.   3. Based on visualassessment, the right ventricle appears mildly enlarged. borderline reduced systolic function.   4. Device lead is visualized in the right heart.   5. The left atrium is moderately dilated.   6. The right atrium is dilated in size.   7. Symmetric mitral valve leaflet tethering.   8. Mild mitral regurgitation.   9. Trileaflet aortic valve with reduced systolic excursion. There is calcification of the aortic valve leaflets. moderate aortic stenosis.  10. The DEVYN is estimated at 1.1sqcm.  11. Moderate to severe tricuspid regurgitation.  12. Estimated pulmonary artery systolic pressure is 78 mmHg, consistent with severe pulmonary hypertension.  13. The inferior vena cava is dilated measuring 2.67 cm in diameter, (dilated >2.1cm) with abnormal inspiratory collapse (abnormal <50%) consistent with elevated right atrial pressure (~15, range 10-20mmHg).    ________________________________________________________________________________________  FINDINGS:     Left Ventricle:  Left ventricular systolic function is mildly decreased with an ejection fraction visually estimated at 45 to 50%. There is global left ventricular hypokinesis.     Right Ventricle:  Based on visual assessment, the right ventricle appears mildly enlarged. Borderline reducedsystolic function. Tricuspid annular plane systolic excursion (TAPSE) is 2.3 cm (normal >=1.7 cm). A device lead is visualized in the right heart.     Left Atrium:  The left atrium is moderately dilated with an indexed volume of 45.99 ml/m².     Right Atrium:  The right atrium is dilated in size.     Aortic Valve:  The aortic valve appears trileaflet with reduced systolic excursion. There is calcification of the aortic valve leaflets. There is moderate aortic stenosis. The peak transaortic velocity is 2.61 m/s, peak transaortic gradient is 27.2 mmHg and mean transaortic gradient is 13.0 mmHg with an LVOT/aortic valve VTI ratio of 0.31. The aortic valve area is estimated at 1.09 cm² by the continuity equation. The DEVYN is estimated at 1.1sqcm. There is trace aortic regurgitation.     Mitral Valve:  There is calcification of the mitral valve annulus. Mitral valve leaflets are diffusely calcified. There is symmetric leaflet tethering. There is mild mitral regurgitation.     Tricuspid Valve:  Structurally normal tricuspid valve with normal leaflet excursion. There is moderate to severe tricuspid regurgitation. Estimated pulmonary artery systolic pressure is 78 mmHg, consistent with severe pulmonary hypertension.     Pulmonic Valve:  The pulmonic valve was not well visualized. There is mild pulmonic regurgitation.     Aorta:  The aortic root at the sinuses of Valsalva is normal in size, measuring 3.00 cm (indexed 1.39 cm/m²). The ascending aorta diameter is normal in size, measuring 2.60 cm (indexed 1.21 cm/m²).     Systemic Veins:  The inferior vena cava is dilated measuring 2.67 cm in diameter, (dilated >2.1cm) with abnormal inspiratory collapse (abnormal <50%) consistent with elevated right atrial pressure (~15, range 10-20mmHg).  ____________________________________________________________________  QUANTITATIVE DATA:  Left Ventricle Measurements: (Indexed to BSA)     IVSd (2D):   1.4 cm  LVPWd (2D):  1.3 cm  LVIDd (2D):  5.4 cm  LVIDs (2D):  4.5 cm  LV Mass:     314 g  145.8g/m²  Visualized LV EF%: 45 to 50%     MV E Vmax:    1.71 m/s  MV A Vmax:    0.75 m/s  MV E/A:       2.27  e' lateral:   6.42 cm/s  e' medial:    5.87 cm/s  E/e' lateral: 26.64  E/e' medial:  29.13  E/e' Average: 27.83  MV DT:        161 msec    Aorta Measurements: (normal range) (Indexed to BSA)     Sinuses of Valsalva: 3.00 cm (2.7 - 3.3 cm)  Ao Asc prox:         2.60 cm       Left Atrium Measurements: (Indexed to BSA)  LA Diam 2D: 5.00 cm    Right Ventricle Measurements:     TAPSE:            2.3 cm  RV Base (RVID1):  4.3 cm  RV Mid (RVID2):   3.2 cm  RV Major (RVID3): 7.5 cm       LVOT / RVOT/ Qp/Qs Data: (Indexed to BSA)  LVOT Diameter: 2.10 cm  LVOT Vmax:     1.02 m/s  LVOT VTI:      17.80 cm  LVOT SV:       61.7 ml  28.61 ml/m²    Aortic Valve Measurements:  AV Vmax:                2.6 m/s  AV Peak Gradient:       27.2 mmHg  AV Mean Gradient:       13.0 mmHg  AV VTI:                 56.6 cm  AV VTI Ratio:           0.31  AoV EOA, Contin:        1.09 cm²  AoV EOA, Contin i:      0.51 cm²/m²  AoV Dimensionless Index 0.31    Mitral Valve Measurements:     MV E Vmax: 1.7 m/s         MR Vmax:          5.05 m/s  MV A Vmax: 0.8 m/s         MR Peak Gradient: 102.0 mmHg  MV E/A:    2.3       Tricuspid Valve Measurements:     TR Vmax:      4.0 m/s  TR Peak Gradient: 62.7 mmHg  RA Pressure:      15 mmHg  PASP:             78 mmHg    ________________________________________________________________________________________  Electronically signed on 1/22/2024 at 5:39:34 PM by Serafin Cooper         *** Final ***

## 2024-04-29 NOTE — PROGRESS NOTE ADULT - SUBJECTIVE AND OBJECTIVE BOX
Patient is a 73y old  Female who presents with a chief complaint of chf exacerbation (29 Apr 2024 10:02)        INTERVAL HPI/OVERNIGHT EVENTS: No acute overnight events. Pt seen and examined at the bedside this AM. Pt states her neuropathy in her feet is mostly bothering her at this time -- would like lidocaine cream for her feet as this is what helps her at home, she states gabapentin doesn't work for her. She is also requesting for eye drops for dry eyes. Otherwise, pt feels ok. Tolerating RA well. Denies cp, sob, abdominal pain, n/v.      MEDICATIONS  (STANDING):  artificial tears (preservative free) Ophthalmic Solution 1 Drop(s) Both EYES every 6 hours  carvedilol 12.5 milliGRAM(s) Oral every 12 hours  dextrose 10% Bolus 125 milliLiter(s) IV Bolus once  dextrose 5%. 1000 milliLiter(s) (50 mL/Hr) IV Continuous <Continuous>  dextrose 5%. 1000 milliLiter(s) (100 mL/Hr) IV Continuous <Continuous>  dextrose 50% Injectable 25 Gram(s) IV Push once  dextrose 50% Injectable 12.5 Gram(s) IV Push once  digoxin     Tablet 250 MICROGram(s) Oral daily  glucagon  Injectable 1 milliGRAM(s) IntraMuscular once  insulin glargine Injectable (LANTUS) 23 Unit(s) SubCutaneous at bedtime  insulin lispro (ADMELOG) corrective regimen sliding scale   SubCutaneous at bedtime  insulin lispro (ADMELOG) corrective regimen sliding scale   SubCutaneous three times a day before meals  insulin lispro Injectable (ADMELOG) 7 Unit(s) SubCutaneous three times a day before meals  levothyroxine 125 MICROGram(s) Oral daily  lidocaine 4% Cream 1 Application(s) Topical daily  losartan 50 milliGRAM(s) Oral daily  rivaroxaban 20 milliGRAM(s) Oral with dinner    MEDICATIONS  (PRN):  acetaminophen     Tablet .. 650 milliGRAM(s) Oral every 6 hours PRN Temp greater or equal to 38C (100.4F), Mild Pain (1 - 3)  ALPRAZolam 0.25 milliGRAM(s) Oral daily PRN anxiety  aluminum hydroxide/magnesium hydroxide/simethicone Suspension 30 milliLiter(s) Oral every 4 hours PRN Dyspepsia  dextrose Oral Gel 15 Gram(s) Oral once PRN Blood Glucose LESS THAN 70 milliGRAM(s)/deciliter  melatonin 3 milliGRAM(s) Oral at bedtime PRN Insomnia  ondansetron Injectable 4 milliGRAM(s) IV Push every 8 hours PRN Nausea and/or Vomiting  zolpidem 5 milliGRAM(s) Oral at bedtime PRN Insomnia      Allergies    penicillin (Short breath; Rash)  statins (Vomiting)  erythromycin (Rash)  Synjardy (Other (Severe))  nystatin topical (Hives; Rash)  vancomycin (Short breath; Rash)    Intolerances        REVIEW OF SYSTEMS:  CONSTITUTIONAL: No fever or chills  HEENT:  No headache, no sore throat  RESPIRATORY: No cough, wheezing, or shortness of breath  CARDIOVASCULAR: No chest pain, palpitations  GASTROINTESTINAL: No abd pain, nausea, vomiting, or diarrhea  GENITOURINARY: No dysuria, frequency, or hematuria  NEUROLOGICAL: no focal weakness or dizziness  MUSCULOSKELETAL: no myalgias     Vital Signs Last 24 Hrs  T(C): 36.6 (29 Apr 2024 04:40), Max: 37.3 (28 Apr 2024 11:35)  T(F): 97.8 (29 Apr 2024 04:40), Max: 99.1 (28 Apr 2024 11:35)  HR: 60 (29 Apr 2024 04:40) (60 - 63)  BP: 161/77 (29 Apr 2024 04:40) (146/66 - 176/74)  BP(mean): --  RR: 18 (29 Apr 2024 04:40) (18 - 18)  SpO2: 91% (29 Apr 2024 04:40) (91% - 93%)    Parameters below as of 29 Apr 2024 04:40  Patient On (Oxygen Delivery Method): room air        PHYSICAL EXAM:  GENERAL: NAD, disheveled  HEENT:  anicteric, moist mucous membranes; poor dentition  CHEST/LUNG:  decreased breath sounds b/l  HEART:  RRR, S1, S2  ABDOMEN:  BS+, soft, nontender, nondistended  EXTREMITIES: LE edema bilaterally, legs wrapped in bandage  NERVOUS SYSTEM: answers questions and follows commands appropriately      LABS:                        12.2   4.52  )-----------( 151      ( 29 Apr 2024 06:05 )             39.3     CBC Full  -  ( 29 Apr 2024 06:05 )  WBC Count : 4.52 K/uL  Hemoglobin : 12.2 g/dL  Hematocrit : 39.3 %  Platelet Count - Automated : 151 K/uL  Mean Cell Volume : 91.8 fl  Mean Cell Hemoglobin : 28.5 pg  Mean Cell Hemoglobin Concentration : 31.0 gm/dL  Auto Neutrophil # : 2.07 K/uL  Auto Lymphocyte # : 1.60 K/uL  Auto Monocyte # : 0.50 K/uL  Auto Eosinophil # : 0.29 K/uL  Auto Basophil # : 0.05 K/uL  Auto Neutrophil % : 45.8 %  Auto Lymphocyte % : 35.4 %  Auto Monocyte % : 11.1 %  Auto Eosinophil % : 6.4 %  Auto Basophil % : 1.1 %    29 Apr 2024 06:05    140    |  96     |  16     ----------------------------<  199    3.5     |  38     |  0.75     Ca    9.3        29 Apr 2024 06:05  Phos  3.7       29 Apr 2024 06:05  Mg     1.9       29 Apr 2024 06:05    TPro  6.8    /  Alb  2.9    /  TBili  0.8    /  DBili  x      /  AST  14     /  ALT  14     /  AlkPhos  97     29 Apr 2024 06:05      Urinalysis Basic - ( 29 Apr 2024 06:05 )    Color: x / Appearance: x / SG: x / pH: x  Gluc: 199 mg/dL / Ketone: x  / Bili: x / Urobili: x   Blood: x / Protein: x / Nitrite: x   Leuk Esterase: x / RBC: x / WBC x   Sq Epi: x / Non Sq Epi: x / Bacteria: x      CAPILLARY BLOOD GLUCOSE      POCT Blood Glucose.: 189 mg/dL (29 Apr 2024 08:12)  POCT Blood Glucose.: 159 mg/dL (28 Apr 2024 21:31)  POCT Blood Glucose.: 232 mg/dL (28 Apr 2024 16:40)  POCT Blood Glucose.: 245 mg/dL (28 Apr 2024 12:00)          RADIOLOGY & ADDITIONAL TESTS:  Personally reviewed.     Consultant(s) Notes Reviewed:  [x] YES  [ ] NO Patient is a 73y old  Female who presents with a chief complaint of chf exacerbation (29 Apr 2024 10:02)    INTERVAL HPI/OVERNIGHT EVENTS: No acute overnight events. Pt seen and examined at the bedside this AM. Pt states her neuropathy in her feet is mostly bothering her at this time -- would like lidocaine cream for her feet as this is what helps her at home, she states gabapentin doesn't work for her. She is also requesting for eye drops for dry eyes. Otherwise, pt feels ok. Tolerating RA well. Denies cp, sob, abdominal pain, n/v.    MEDICATIONS  (STANDING):  artificial tears (preservative free) Ophthalmic Solution 1 Drop(s) Both EYES every 6 hours  carvedilol 12.5 milliGRAM(s) Oral every 12 hours  dextrose 10% Bolus 125 milliLiter(s) IV Bolus once  dextrose 5%. 1000 milliLiter(s) (50 mL/Hr) IV Continuous <Continuous>  dextrose 5%. 1000 milliLiter(s) (100 mL/Hr) IV Continuous <Continuous>  dextrose 50% Injectable 25 Gram(s) IV Push once  dextrose 50% Injectable 12.5 Gram(s) IV Push once  digoxin     Tablet 250 MICROGram(s) Oral daily  glucagon  Injectable 1 milliGRAM(s) IntraMuscular once  insulin glargine Injectable (LANTUS) 23 Unit(s) SubCutaneous at bedtime  insulin lispro (ADMELOG) corrective regimen sliding scale   SubCutaneous at bedtime  insulin lispro (ADMELOG) corrective regimen sliding scale   SubCutaneous three times a day before meals  insulin lispro Injectable (ADMELOG) 7 Unit(s) SubCutaneous three times a day before meals  levothyroxine 125 MICROGram(s) Oral daily  lidocaine 4% Cream 1 Application(s) Topical daily  losartan 50 milliGRAM(s) Oral daily  rivaroxaban 20 milliGRAM(s) Oral with dinner    MEDICATIONS  (PRN):  acetaminophen     Tablet .. 650 milliGRAM(s) Oral every 6 hours PRN Temp greater or equal to 38C (100.4F), Mild Pain (1 - 3)  ALPRAZolam 0.25 milliGRAM(s) Oral daily PRN anxiety  aluminum hydroxide/magnesium hydroxide/simethicone Suspension 30 milliLiter(s) Oral every 4 hours PRN Dyspepsia  dextrose Oral Gel 15 Gram(s) Oral once PRN Blood Glucose LESS THAN 70 milliGRAM(s)/deciliter  melatonin 3 milliGRAM(s) Oral at bedtime PRN Insomnia  ondansetron Injectable 4 milliGRAM(s) IV Push every 8 hours PRN Nausea and/or Vomiting  zolpidem 5 milliGRAM(s) Oral at bedtime PRN Insomnia      Allergies    penicillin (Short breath; Rash)  statins (Vomiting)  erythromycin (Rash)  Synjardy (Other (Severe))  nystatin topical (Hives; Rash)  vancomycin (Short breath; Rash)    Intolerances        REVIEW OF SYSTEMS:  CONSTITUTIONAL: No fever or chills  HEENT:  No headache, no sore throat  RESPIRATORY: No cough, wheezing, or shortness of breath  CARDIOVASCULAR: No chest pain, palpitations  GASTROINTESTINAL: No abd pain, nausea, vomiting, or diarrhea  GENITOURINARY: No dysuria, frequency, or hematuria  NEUROLOGICAL: no focal weakness or dizziness  MUSCULOSKELETAL: no myalgias     Vital Signs Last 24 Hrs  T(C): 36.6 (29 Apr 2024 04:40), Max: 37.3 (28 Apr 2024 11:35)  T(F): 97.8 (29 Apr 2024 04:40), Max: 99.1 (28 Apr 2024 11:35)  HR: 60 (29 Apr 2024 04:40) (60 - 63)  BP: 161/77 (29 Apr 2024 04:40) (146/66 - 176/74)  BP(mean): --  RR: 18 (29 Apr 2024 04:40) (18 - 18)  SpO2: 91% (29 Apr 2024 04:40) (91% - 93%)    Parameters below as of 29 Apr 2024 04:40  Patient On (Oxygen Delivery Method): room air        PHYSICAL EXAM:  GENERAL: NAD, disheveled  HEENT:  anicteric, moist mucous membranes; poor dentition  CHEST/LUNG:  decreased breath sounds b/l  HEART:  RRR, S1, S2  ABDOMEN:  BS+, soft, nontender, nondistended  EXTREMITIES: LE edema bilaterally, legs wrapped in bandage  NERVOUS SYSTEM: answers questions and follows commands appropriately      LABS:                        12.2   4.52  )-----------( 151      ( 29 Apr 2024 06:05 )             39.3     CBC Full  -  ( 29 Apr 2024 06:05 )  WBC Count : 4.52 K/uL  Hemoglobin : 12.2 g/dL  Hematocrit : 39.3 %  Platelet Count - Automated : 151 K/uL  Mean Cell Volume : 91.8 fl  Mean Cell Hemoglobin : 28.5 pg  Mean Cell Hemoglobin Concentration : 31.0 gm/dL  Auto Neutrophil # : 2.07 K/uL  Auto Lymphocyte # : 1.60 K/uL  Auto Monocyte # : 0.50 K/uL  Auto Eosinophil # : 0.29 K/uL  Auto Basophil # : 0.05 K/uL  Auto Neutrophil % : 45.8 %  Auto Lymphocyte % : 35.4 %  Auto Monocyte % : 11.1 %  Auto Eosinophil % : 6.4 %  Auto Basophil % : 1.1 %    29 Apr 2024 06:05    140    |  96     |  16     ----------------------------<  199    3.5     |  38     |  0.75     Ca    9.3        29 Apr 2024 06:05  Phos  3.7       29 Apr 2024 06:05  Mg     1.9       29 Apr 2024 06:05    TPro  6.8    /  Alb  2.9    /  TBili  0.8    /  DBili  x      /  AST  14     /  ALT  14     /  AlkPhos  97     29 Apr 2024 06:05      Urinalysis Basic - ( 29 Apr 2024 06:05 )    Color: x / Appearance: x / SG: x / pH: x  Gluc: 199 mg/dL / Ketone: x  / Bili: x / Urobili: x   Blood: x / Protein: x / Nitrite: x   Leuk Esterase: x / RBC: x / WBC x   Sq Epi: x / Non Sq Epi: x / Bacteria: x      CAPILLARY BLOOD GLUCOSE      POCT Blood Glucose.: 189 mg/dL (29 Apr 2024 08:12)  POCT Blood Glucose.: 159 mg/dL (28 Apr 2024 21:31)  POCT Blood Glucose.: 232 mg/dL (28 Apr 2024 16:40)  POCT Blood Glucose.: 245 mg/dL (28 Apr 2024 12:00)          RADIOLOGY & ADDITIONAL TESTS:  Personally reviewed.     Consultant(s) Notes Reviewed:  [x] YES  [ ] NO

## 2024-04-29 NOTE — CONSULT NOTE ADULT - SUBJECTIVE AND OBJECTIVE BOX
Patient is a 73y old  Female who presents with a chief complaint of chf exacerbation (2024 10:52)       HPI:  73 year old female with a past medical history of atrial fibrillation on Xarelto, bradycardia status post pacemaker, hypertension, diabetes, cardiomyopathy, pulmonary hypertension is presenting with lower extremity edema.  States that this similar presentation happened to her previously when she was admitted to the hospital.  She states her LE edema improved after discharge in January but then has been worsening for the past month. She endorse some shortness of breath and difficulty laying flat as well. with her symptoms.  Denies any chest pain.  States that she saw her endocrinologist and was told she had gained 35 pounds in water weight due to the swelling in her legs. She states her legs do feel uncomfortable States that ambulation has been more difficult for her recently due to her symptoms.   She states denies any other complaints. She follows with Cardio Dr. Yvan Ellis. Last TTE in 2024 showed EF 45-50%.   Urinating without issue.  No N/V.  Has not seen nephrologist in the past.        PAST MEDICAL & SURGICAL HISTORY:  Hypothyroid      DM (diabetes mellitus)      HTN (hypertension)      Atrial fibrillation and flutter  s/p ablation      Bradycardia  s/p MDT PPM      H/O pulmonary hypertension      H/O hyperparathyroidism      H/O cardiomyopathy      Cardiac pacemaker      H/O cardiac radiofrequency ablation      H/O carpal tunnel repair      History of parathyroid surgery           FAMILY HISTORY:  No pertinent family history in first degree relatives    [place]    Social History:Non smoker    MEDICATIONS  (STANDING):  artificial tears (preservative free) Ophthalmic Solution 1 Drop(s) Both EYES every 6 hours  carvedilol 12.5 milliGRAM(s) Oral every 12 hours  dextrose 10% Bolus 125 milliLiter(s) IV Bolus once  dextrose 5%. 1000 milliLiter(s) (50 mL/Hr) IV Continuous <Continuous>  dextrose 5%. 1000 milliLiter(s) (100 mL/Hr) IV Continuous <Continuous>  dextrose 50% Injectable 25 Gram(s) IV Push once  dextrose 50% Injectable 12.5 Gram(s) IV Push once  digoxin     Tablet 250 MICROGram(s) Oral daily  glucagon  Injectable 1 milliGRAM(s) IntraMuscular once  insulin glargine Injectable (LANTUS) 23 Unit(s) SubCutaneous at bedtime  insulin lispro (ADMELOG) corrective regimen sliding scale   SubCutaneous at bedtime  insulin lispro (ADMELOG) corrective regimen sliding scale   SubCutaneous three times a day before meals  insulin lispro Injectable (ADMELOG) 7 Unit(s) SubCutaneous three times a day before meals  levothyroxine 125 MICROGram(s) Oral daily  lidocaine 4% Cream 1 Application(s) Topical daily  losartan 50 milliGRAM(s) Oral daily  rivaroxaban 20 milliGRAM(s) Oral with dinner    MEDICATIONS  (PRN):  acetaminophen     Tablet .. 650 milliGRAM(s) Oral every 6 hours PRN Temp greater or equal to 38C (100.4F), Mild Pain (1 - 3)  ALPRAZolam 0.25 milliGRAM(s) Oral daily PRN anxiety  aluminum hydroxide/magnesium hydroxide/simethicone Suspension 30 milliLiter(s) Oral every 4 hours PRN Dyspepsia  dextrose Oral Gel 15 Gram(s) Oral once PRN Blood Glucose LESS THAN 70 milliGRAM(s)/deciliter  melatonin 3 milliGRAM(s) Oral at bedtime PRN Insomnia  ondansetron Injectable 4 milliGRAM(s) IV Push every 8 hours PRN Nausea and/or Vomiting  zolpidem 5 milliGRAM(s) Oral at bedtime PRN Insomnia   Meds reviewed    Allergies    penicillin (Short breath; Rash)  statins (Vomiting)  erythromycin (Rash)  Synjardy (Other (Severe))  nystatin topical (Hives; Rash)  vancomycin (Short breath; Rash)    Intolerances         REVIEW OF SYSTEMS:    Review of Systems:   Constitutional: Denies fatigue  HEENT: Denies headaches and dizziness  Respiratory: denies SOB, cough, or wheezing  Cardiovascular: denies CP, palpitations  Gastrointestinal: Denies nausea, denies vomiting, diarrhea, constipation, abdominal pain, or bloody stools  Genitourinary: denies painful urination, increased frequency, urgency, or bloody urine  Skin: denies rashes or itching  Musculoskeletal: denies muscle aches, joint swelling  Neurologic: Denies generalized weakness, denies loss of sensation, numbness, or tingling      Vital Signs Last 24 Hrs  T(C): 36.9 (2024 11:42), Max: 36.9 (2024 11:42)  T(F): 98.4 (2024 11:42), Max: 98.4 (2024 11:42)  HR: 64 (2024 11:42) (60 - 64)  BP: 159/76 (2024 11:42) (146/66 - 176/74)  BP(mean): --  RR: 18 (2024 11:42) (18 - 18)  SpO2: 91% (2024 11:42) (91% - 93%)    Parameters below as of 2024 11:42  Patient On (Oxygen Delivery Method): room air      Daily     Daily Weight in k.5 (2024 04:40)    PHYSICAL EXAM:    GENERAL: NAD  HEAD:  Atraumatic, Normocephalic  EYES: EOMI, conjunctiva and sclera clear  ENMT: No Drainage from nares, No drainage from ears  NERVOUS SYSTEM:  Awake and Alert  CHEST/LUNG: Clear to percussion bilaterally  EXTREMITIES:  No Edema  SKIN: No rashes No obvious ecchymosis      LABS:                        12.2   4.52  )-----------( 151      ( 2024 06:05 )             39.3         140  |  96  |  16  ----------------------------<  199<H>  3.5   |  38<H>  |  0.75    Ca    9.3      2024 06:05  Phos  3.7       Mg     1.9         TPro  6.8  /  Alb  2.9<L>  /  TBili  0.8  /  DBili  x   /  AST  14<L>  /  ALT  14  /  AlkPhos  97        Urinalysis Basic - ( 2024 06:05 )    Color: x / Appearance: x / SG: x / pH: x  Gluc: 199 mg/dL / Ketone: x  / Bili: x / Urobili: x   Blood: x / Protein: x / Nitrite: x   Leuk Esterase: x / RBC: x / WBC x   Sq Epi: x / Non Sq Epi: x / Bacteria: x      Magnesium: 1.9 mg/dL ( @ 06:05)  Phosphorus: 3.7 mg/dL ( @ 06:05)          RADIOLOGY & ADDITIONAL TESTS:

## 2024-04-29 NOTE — DISCHARGE NOTE PROVIDER - NSDCCPCAREPLAN_GEN_ALL_CORE_FT
PRINCIPAL DISCHARGE DIAGNOSIS  Diagnosis: CHF exacerbation  Assessment and Plan of Treatment: You were admitted for CHF exacerbation, you were treated with IV diuretics.  Start taking losartan 50 mg for GMT  Follow up with your PCP within 1 week of discharge  Follow up with your cardiologist within 1 week of discharge        SECONDARY DISCHARGE DIAGNOSES  Diagnosis: LUCIE (acute kidney injury)  Assessment and Plan of Treatment: You had acute kidney injury.  Follow up with your PCP within 1 week of discharge    Diagnosis: Open wound  Assessment and Plan of Treatment: You have wounds on your legs  Follow up with your PCP within 1 week of discharge  Follow up with Wound Care outpatient     PRINCIPAL DISCHARGE DIAGNOSIS  Diagnosis: CHF exacerbation  Assessment and Plan of Treatment: You were admitted for CHF exacerbation, you were treated with IV diuretics and acetazolamide for contraction alkalosis  TTE ____  Start taking _____  Follow up with your PCP within 1 week of discharge  Follow up with your cardiologist within 1 week of discharge        SECONDARY DISCHARGE DIAGNOSES  Diagnosis: HTN (hypertension)  Assessment and Plan of Treatment: Please start taking losartan 100 mg daily  Follow up with your PCP within 1 week of discharge  Follow up with your cardiologist within 1 week of discharge      Diagnosis: LUCIE (acute kidney injury)  Assessment and Plan of Treatment: You had acute kidney injury, which improved during hospitalization.  Follow up with your PCP within 1 week of discharge    Diagnosis: Open wound  Assessment and Plan of Treatment: You have wounds on your legs  Follow up with your PCP within 1 week of discharge  Follow up with Wound Care outpatient    Diagnosis: Type 2 diabetes mellitus  Assessment and Plan of Treatment: This is a chronic problem, please resume home insulin regimen  Follow up with your PCP within 1 week of discharge  Follow up with your endocrinologist within 1 week of discharge       PRINCIPAL DISCHARGE DIAGNOSIS  Diagnosis: CHF exacerbation  Assessment and Plan of Treatment: You were admitted for CHF exacerbation, you were treated with IV diuretics and acetazolamide for contraction alkalosis. You refused to do TTE.  Start taking _____  Follow up with your PCP within 1 week of discharge  Follow up with your cardiologist within 1 week of discharge        SECONDARY DISCHARGE DIAGNOSES  Diagnosis: HTN (hypertension)  Assessment and Plan of Treatment: Please start taking losartan 100 mg daily  Follow up with your PCP within 1 week of discharge  Follow up with your cardiologist within 1 week of discharge      Diagnosis: LUCIE (acute kidney injury)  Assessment and Plan of Treatment: You had acute kidney injury, which improved during hospitalization.  Follow up with your PCP within 1 week of discharge    Diagnosis: Open wound  Assessment and Plan of Treatment: You have wounds on your legs, apply bacitracin to legs as instructed  Follow up with your PCP within 1 week of discharge    Diagnosis: Type 2 diabetes mellitus  Assessment and Plan of Treatment: This is a chronic problem, please resume home insulin regimen  Follow up with your PCP within 1 week of discharge  Follow up with your endocrinologist within 1 week of discharge       PRINCIPAL DISCHARGE DIAGNOSIS  Diagnosis: CHF exacerbation  Assessment and Plan of Treatment: You were admitted for CHF exacerbation, you were treated with IV diuretics and acetazolamide for contraction alkalosis. You declined repeat ECHO, please follow up with your cardiologist.   Start taking lasix 40mg daily   Follow up with your PCP within 1 week of discharge  Follow up with your cardiologist within 1 week of discharge        SECONDARY DISCHARGE DIAGNOSES  Diagnosis: Open wound  Assessment and Plan of Treatment: You have wounds on your legs, apply bacitracin to legs as instructed  Follow up with your PCP within 1 week of discharge    Diagnosis: Type 2 diabetes mellitus  Assessment and Plan of Treatment: This is a chronic problem, please resume home insulin regimen  Follow up with your PCP within 1 week of discharge  Follow up with your endocrinologist within 1 week of discharge      Diagnosis: HTN (hypertension)  Assessment and Plan of Treatment: Please start taking losartan 100 mg daily  Follow up with your PCP within 1 week of discharge  Follow up with your cardiologist within 1 week of discharge

## 2024-04-29 NOTE — DISCHARGE NOTE PROVIDER - NSDCMRMEDTOKEN_GEN_ALL_CORE_FT
carvedilol 12.5 mg oral tablet: 1 tab(s) orally every 12 hours  digoxin 250 mcg (0.25 mg) oral tablet: 1 tab(s) orally once a day  insulin regular (concentrated) 500 units/mL human recombinant subcutaneous solution: 50 unit(s) subcutaneous 2 times a day (before meals)  Synthroid 125 mcg (0.125 mg) oral tablet: 1 tab(s) orally once a day  Tylenol 325 mg oral tablet: 2 tab(s) orally every 4 hours  Xarelto 20 mg oral tablet: 1 tab(s) orally once a day (in the evening) resume today  zolpidem 12.5 mg oral tablet, extended release: 1 tab(s) orally once a day (at bedtime)   carvedilol 12.5 mg oral tablet: 1 tab(s) orally every 12 hours  digoxin 250 mcg (0.25 mg) oral tablet: 1 tab(s) orally once a day  insulin regular (concentrated) 500 units/mL human recombinant subcutaneous solution: 50 unit(s) subcutaneous 2 times a day (before meals)  levothyroxine 125 mcg (0.125 mg) oral tablet: 1 tab(s) orally once a day  loratadine 10 mg oral tablet: 1 tab(s) orally once a day  losartan 100 mg oral tablet: 1 tab(s) orally once a day hold for SBP &lt; 110  Tylenol 325 mg oral tablet: 2 tab(s) orally every 4 hours  Xarelto 20 mg oral tablet: 1 tab(s) orally once a day (in the evening) resume today  zolpidem 12.5 mg oral tablet, extended release: 1 tab(s) orally once a day (at bedtime)

## 2024-04-30 ENCOUNTER — APPOINTMENT (OUTPATIENT)
Dept: WOUND CARE | Facility: HOSPITAL | Age: 74
End: 2024-04-30

## 2024-04-30 LAB
ALBUMIN SERPL ELPH-MCNC: 3 G/DL — LOW (ref 3.3–5)
ALP SERPL-CCNC: 101 U/L — SIGNIFICANT CHANGE UP (ref 40–120)
ALT FLD-CCNC: 13 U/L — SIGNIFICANT CHANGE UP (ref 12–78)
ANION GAP SERPL CALC-SCNC: 4 MMOL/L — LOW (ref 5–17)
AST SERPL-CCNC: 18 U/L — SIGNIFICANT CHANGE UP (ref 15–37)
BILIRUB SERPL-MCNC: 0.9 MG/DL — SIGNIFICANT CHANGE UP (ref 0.2–1.2)
BUN SERPL-MCNC: 19 MG/DL — SIGNIFICANT CHANGE UP (ref 7–23)
CALCIUM SERPL-MCNC: 9.4 MG/DL — SIGNIFICANT CHANGE UP (ref 8.5–10.1)
CHLORIDE SERPL-SCNC: 96 MMOL/L — SIGNIFICANT CHANGE UP (ref 96–108)
CO2 SERPL-SCNC: 40 MMOL/L — HIGH (ref 22–31)
CREAT SERPL-MCNC: 0.82 MG/DL — SIGNIFICANT CHANGE UP (ref 0.5–1.3)
EGFR: 75 ML/MIN/1.73M2 — SIGNIFICANT CHANGE UP
GLUCOSE SERPL-MCNC: 221 MG/DL — HIGH (ref 70–99)
HCT VFR BLD CALC: 39.4 % — SIGNIFICANT CHANGE UP (ref 34.5–45)
HGB BLD-MCNC: 12.4 G/DL — SIGNIFICANT CHANGE UP (ref 11.5–15.5)
MCHC RBC-ENTMCNC: 28.5 PG — SIGNIFICANT CHANGE UP (ref 27–34)
MCHC RBC-ENTMCNC: 31.5 GM/DL — LOW (ref 32–36)
MCV RBC AUTO: 90.6 FL — SIGNIFICANT CHANGE UP (ref 80–100)
NRBC # BLD: 0 /100 WBCS — SIGNIFICANT CHANGE UP (ref 0–0)
PLATELET # BLD AUTO: 145 K/UL — LOW (ref 150–400)
POTASSIUM SERPL-MCNC: 3.6 MMOL/L — SIGNIFICANT CHANGE UP (ref 3.5–5.3)
POTASSIUM SERPL-SCNC: 3.6 MMOL/L — SIGNIFICANT CHANGE UP (ref 3.5–5.3)
PROT SERPL-MCNC: 6.9 G/DL — SIGNIFICANT CHANGE UP (ref 6–8.3)
RBC # BLD: 4.35 M/UL — SIGNIFICANT CHANGE UP (ref 3.8–5.2)
RBC # FLD: 15 % — HIGH (ref 10.3–14.5)
SODIUM SERPL-SCNC: 140 MMOL/L — SIGNIFICANT CHANGE UP (ref 135–145)
WBC # BLD: 3.87 K/UL — SIGNIFICANT CHANGE UP (ref 3.8–10.5)
WBC # FLD AUTO: 3.87 K/UL — SIGNIFICANT CHANGE UP (ref 3.8–10.5)

## 2024-04-30 PROCEDURE — 99232 SBSQ HOSP IP/OBS MODERATE 35: CPT

## 2024-04-30 RX ORDER — ACETAZOLAMIDE 250 MG/1
250 TABLET ORAL ONCE
Refills: 0 | Status: COMPLETED | OUTPATIENT
Start: 2024-04-30 | End: 2024-04-30

## 2024-04-30 RX ORDER — LIDOCAINE 4 G/100G
1 CREAM TOPICAL
Refills: 0 | Status: DISCONTINUED | OUTPATIENT
Start: 2024-04-30 | End: 2024-05-02

## 2024-04-30 RX ADMIN — LIDOCAINE 1 APPLICATION(S): 4 CREAM TOPICAL at 13:29

## 2024-04-30 RX ADMIN — Medication 7 UNIT(S): at 08:26

## 2024-04-30 RX ADMIN — Medication 1 DROP(S): at 13:29

## 2024-04-30 RX ADMIN — ZOLPIDEM TARTRATE 5 MILLIGRAM(S): 10 TABLET ORAL at 22:04

## 2024-04-30 RX ADMIN — CARVEDILOL PHOSPHATE 12.5 MILLIGRAM(S): 80 CAPSULE, EXTENDED RELEASE ORAL at 06:10

## 2024-04-30 RX ADMIN — Medication 7 UNIT(S): at 12:55

## 2024-04-30 RX ADMIN — Medication 1 DROP(S): at 00:03

## 2024-04-30 RX ADMIN — Medication 7 UNIT(S): at 17:29

## 2024-04-30 RX ADMIN — Medication 125 MICROGRAM(S): at 06:10

## 2024-04-30 RX ADMIN — Medication 1 DROP(S): at 17:30

## 2024-04-30 RX ADMIN — Medication 650 MILLIGRAM(S): at 22:04

## 2024-04-30 RX ADMIN — LIDOCAINE 1 APPLICATION(S): 4 CREAM TOPICAL at 22:10

## 2024-04-30 RX ADMIN — Medication 6: at 17:29

## 2024-04-30 RX ADMIN — CARVEDILOL PHOSPHATE 12.5 MILLIGRAM(S): 80 CAPSULE, EXTENDED RELEASE ORAL at 17:30

## 2024-04-30 RX ADMIN — Medication 1 DROP(S): at 06:10

## 2024-04-30 RX ADMIN — INSULIN GLARGINE 23 UNIT(S): 100 INJECTION, SOLUTION SUBCUTANEOUS at 22:04

## 2024-04-30 RX ADMIN — Medication 0.25 MILLIGRAM(S): at 00:03

## 2024-04-30 RX ADMIN — Medication 250 MICROGRAM(S): at 06:09

## 2024-04-30 RX ADMIN — Medication 40 MILLIGRAM(S): at 06:09

## 2024-04-30 RX ADMIN — LOSARTAN POTASSIUM 75 MILLIGRAM(S): 100 TABLET, FILM COATED ORAL at 06:10

## 2024-04-30 RX ADMIN — Medication 650 MILLIGRAM(S): at 08:26

## 2024-04-30 RX ADMIN — RIVAROXABAN 20 MILLIGRAM(S): KIT at 17:30

## 2024-04-30 RX ADMIN — Medication 4: at 08:27

## 2024-04-30 RX ADMIN — CLOTRIMAZOLE AND BETAMETHASONE DIPROPIONATE 1 APPLICATION(S): 10; .5 CREAM TOPICAL at 13:29

## 2024-04-30 NOTE — CASE MANAGEMENT PROGRESS NOTE - NSCMPROGRESSNOTE_GEN_ALL_CORE
Spoke with patient at bedside,as she was inquiring about home care services. Explained to patient the intermittency and short tem nature of covered service. Patient states she now can drive and ambulate well  since her LE edema has subsided. She has 3 [employer] C and flight to B/B. Asked PT to see patient again soon to evaluate her ability to negotiate stairs. She states she will take a taxi home and would then be able to get her car, to do food shopping . Referral to [facility] agency to be made for F/U VN/PT for med management and stair reinforcement. CM to follow

## 2024-04-30 NOTE — GOALS OF CARE CONVERSATION - ADVANCED CARE PLANNING - CONVERSATION DETAILS
[facility]-Palliative care SW met with patient at bedside. Reviewed patient's medical and social history as well as events leading to patient's hospitalization. Writer discussed patient's current diagnosis (localized edema, CHF exacerbation, HX of Afib, AFlutter s/p Ablation, bradycardia and syncope s/p PPM; HTN; DM; cardiomyopathy), medical condition and management. Patient is known to PC team from previous hospital admission where patient completed a HCP naming patient's sister-in-law Janki Salgado as health care agent. SW also inquired about patient's wishes regarding extent of medical care to be provided including thoughts regarding cardiopulmonary resuscitation and mechanical ventilation/intubation. 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 She states that she would want CPR attempted and has discussed with her sister in law her wishes. Patient states she would not want to live on machines. Patient showed insight into medical condition. All questions answered. Psychosocial support provided.

## 2024-04-30 NOTE — PROGRESS NOTE ADULT - ASSESSMENT
Metabolic Alkalosis  CKD 2  Edema  HTN  CHF    -Metabolic Alkalosis likely from diuretic and contraction  -Started on losartan  -Currently off diuretic, no renal objection to restarting diuretic at this point but will need reduced dosing and consider less frequency  -Alkalosis was improving, now stabilized   -Cardiology note reviewed  -Renal indices are stable    DC planning Metabolic Alkalosis  CKD 2  Edema  HTN  CHF    -Metabolic Alkalosis likely from diuretic and contraction  -Started on losartan  -Currently off diuretic, no renal objection to restarting diuretic; agree with cardiology recs regarding diuretic and diamox  -Alkalosis without further improvement; can consider giving a dose of diamox today 250mg  -Cardiology note reviewed  -Renal indices are stable    DC planning

## 2024-04-30 NOTE — PROGRESS NOTE ADULT - SUBJECTIVE AND OBJECTIVE BOX
[facility] -- Josue Moore, Kenneth Bee, Kimberli, Ang Drake: Office # [phone]    Follow Up:  CHF    Subjective/Observations: Patient seen and examined. Patient awake, alert, resting in bed. No complaints of chest pain, dyspnea, palpitations or dizziness. Tolerating O2 via nasal cannula. Reports foot pain. + LE edema     REVIEW OF SYSTEMS: All other review of systems are negative unless indicated above    PAST MEDICAL & SURGICAL HISTORY:  Hypothyroid      DM (diabetes mellitus)      HTN (hypertension)      Atrial fibrillation and flutter  s/p ablation      Bradycardia  s/p MDT PPM      H/O pulmonary hypertension      H/O hyperparathyroidism      H/O cardiomyopathy      Cardiac pacemaker      H/O cardiac radiofrequency ablation      H/O carpal tunnel repair      History of parathyroid surgery    MEDICATIONS  (STANDING):  artificial tears (preservative free) Ophthalmic Solution 1 Drop(s) Both EYES every 6 hours  carvedilol 12.5 milliGRAM(s) Oral every 12 hours  clotrimazole/betamethasone Cream 1 Application(s) Topical daily  dextrose 10% Bolus 125 milliLiter(s) IV Bolus once  dextrose 5%. 1000 milliLiter(s) (50 mL/Hr) IV Continuous <Continuous>  dextrose 5%. 1000 milliLiter(s) (100 mL/Hr) IV Continuous <Continuous>  dextrose 50% Injectable 25 Gram(s) IV Push once  dextrose 50% Injectable 12.5 Gram(s) IV Push once  digoxin     Tablet 250 MICROGram(s) Oral daily  furosemide    Tablet 40 milliGRAM(s) Oral daily  glucagon  Injectable 1 milliGRAM(s) IntraMuscular once  insulin glargine Injectable (LANTUS) 23 Unit(s) SubCutaneous at bedtime  insulin lispro (ADMELOG) corrective regimen sliding scale   SubCutaneous at bedtime  insulin lispro (ADMELOG) corrective regimen sliding scale   SubCutaneous three times a day before meals  insulin lispro Injectable (ADMELOG) 7 Unit(s) SubCutaneous three times a day before meals  levothyroxine 125 MICROGram(s) Oral daily  lidocaine 4% Cream 1 Application(s) Topical daily  losartan 75 milliGRAM(s) Oral daily  rivaroxaban 20 milliGRAM(s) Oral with dinner    MEDICATIONS  (PRN):  acetaminophen     Tablet .. 650 milliGRAM(s) Oral every 6 hours PRN Temp greater or equal to 38C (100.4F), Mild Pain (1 - 3)  ALPRAZolam 0.25 milliGRAM(s) Oral daily PRN anxiety  aluminum hydroxide/magnesium hydroxide/simethicone Suspension 30 milliLiter(s) Oral every 4 hours PRN Dyspepsia  dextrose Oral Gel 15 Gram(s) Oral once PRN Blood Glucose LESS THAN 70 milliGRAM(s)/deciliter  melatonin 3 milliGRAM(s) Oral at bedtime PRN Insomnia  ondansetron Injectable 4 milliGRAM(s) IV Push every 8 hours PRN Nausea and/or Vomiting  zolpidem 5 milliGRAM(s) Oral at bedtime PRN Insomnia    Allergies  penicillin (Short breath; Rash)  statins (Vomiting)  erythromycin (Rash)  Synjardy (Other (Severe))  nystatin topical (Hives; Rash)  vancomycin (Short breath; Rash)    Vital Signs Last 24 Hrs  T(C): 36.6 (30 Apr 2024 04:08), Max: 36.9 (29 Apr 2024 11:42)  T(F): 97.8 (30 Apr 2024 04:08), Max: 98.4 (29 Apr 2024 11:42)  HR: 61 (30 Apr 2024 04:08) (61 - 64)  BP: 177/87 (30 Apr 2024 04:08) (143/72 - 177/87)  BP(mean): --  RR: 18 (30 Apr 2024 04:08) (18 - 18)  SpO2: 91% (30 Apr 2024 04:08) (91% - 91%)    Parameters below as of 30 Apr 2024 04:08  Patient On (Oxygen Delivery Method): room air      I&O's Summary    29 Apr 2024 07:01  -  30 Apr 2024 07:00  --------------------------------------------------------  IN: 0 mL / OUT: 1400 mL / NET: -1400 mL        TELE: AP 50-60s, PVC  PHYSICAL EXAM:  Constitutional: NAD, awake and alert  HEENT: Moist Mucous Membranes, Anicteric  Pulmonary: Non-labored, breath sounds are clear bilaterally, Diminished at bases No wheezing, rales or rhonchi  Cardiovascular: Regular, S1 and S2, No murmurs, No rubs, gallops or clicks  Gastrointestinal:  soft, nontender, nondistended   Lymph: +1-2  peripheral edema. No lymphadenopathy.   Skin: No visible rashes or ulcers.  Psych:  Mood & affect appropriate      LABS: All Labs Reviewed:                        12.4   3.87  )-----------( 145      ( 30 Apr 2024 07:50 )             39.4                         12.2   4.52  )-----------( 151      ( 29 Apr 2024 06:05 )             39.3                         11.8   4.03  )-----------( 140      ( 28 Apr 2024 06:50 )             38.3     29 Apr 2024 06:05    140    |  96     |  16     ----------------------------<  199    3.5     |  38     |  0.75   28 Apr 2024 06:50    141    |  96     |  18     ----------------------------<  223    3.5     |  41     |  0.79     Ca    9.3        29 Apr 2024 06:05  Ca    9.2        28 Apr 2024 06:50  Phos  3.7       29 Apr 2024 06:05  Phos  3.9       28 Apr 2024 06:50  Mg     1.9       29 Apr 2024 06:05  Mg     1.7       28 Apr 2024 06:50    TPro  6.8    /  Alb  2.9    /  TBili  0.8    /  DBili  x      /  AST  14     /  ALT  14     /  AlkPhos  97     29 Apr 2024 06:05  TPro  7.0    /  Alb  3.0    /  TBili  1.0    /  DBili  x      /  AST  17     /  ALT  12     /  AlkPhos  104    28 Apr 2024 06:50   LIVER FUNCTIONS - ( 29 Apr 2024 06:05 )  Alb: 2.9 g/dL / Pro: 6.8 g/dL / ALK PHOS: 97 U/L / ALT: 14 U/L / AST: 14 U/L / GGT: x           Troponin I, High Sensitivity Result: 38.0 ng/L (04-25-24 @ 19:16)    12 Lead ECG:   Ventricular Rate 61 BPM    Atrial Rate 61 BPM    P-R Interval 432 ms    QRS Duration 174 ms    Q-T Interval 450 ms    QTC Calculation(Bazett) 453 ms    R Axis -50 degrees    T Axis 126 degrees    Diagnosis Line Sinus rhythm with 1st degree AV block  atrial-paced complexes  Right bundle branch block  Left anterior fascicular block  *** Bifascicular block ***  Left ventricular hypertrophy with repolarization abnormality ( R in aVL )    Confirmed by USHA BEE ([id number]) on 4/26/2024 7:51:43 AM (04-25-24 @ 16:56)      TRANSTHORACIC ECHOCARDIOGRAM REPORT  ________________________________________________________________________________                                      _______       Pt. Name:       ROSITA GUTIERREZ Study Date:    1/22/2024  MRN:            [id number]          YOB: 1950  Accession #:    [id number]         Age:           73 years  Account#:       [id number]        Gender:        F  Heart Rate:                       Height:        ( )  Rhythm:                           Weight:   220.46 lb (100.00 kg)  Blood Pressure: 160/61 mmHg       BSA/BMI:       2.15 m² /  ________________________________________________________________________________________  Referring Physician:    [id number] Lexx Cuenca  Interpreting Physician: Serafin Cooper  Primary Sonographer:    Bernabe Heredia    CPT:               ECHO TTE WO CON COMP W DOPP - 54780.m  Indication(s):     Dyspnea, unspecified - R06.00  Procedure:         Transthoracic echocardiogram with 2-D, M-mode and complete          spectral and color flow Doppler.  Ordering Location: [facility]  Admission Status:  Inpatient  Study Information: Image quality for this study is technically difficult.    _______________________________________________________________________________________     CONCLUSIONS:      1. Technically difficult image quality.   2. Left ventricular systolic function is mildly decreased with an ejection fraction visually estimated at 45 to 50 %. Global left ventricular hypokinesis.   3. Based on visualassessment, the right ventricle appears mildly enlarged. borderline reduced systolic function.   4. Device lead is visualized in the right heart.   5. The left atrium is moderately dilated.   6. The right atrium is dilated in size.   7. Symmetric mitral valve leaflet tethering.   8. Mild mitral regurgitation.   9. Trileaflet aortic valve with reduced systolic excursion. There is calcification of the aortic valve leaflets. moderate aortic stenosis.  10. The DEVYN is estimated at 1.1sqcm.  11. Moderate to severe tricuspid regurgitation.  12. Estimated pulmonary artery systolic pressure is 78 mmHg, consistent with severe pulmonary hypertension.  13. The inferior vena cava is dilated measuring 2.67 cm in diameter, (dilated >2.1cm) with abnormal inspiratory collapse (abnormal <50%) consistent with elevated right atrial pressure (~15, range 10-20mmHg).    ________________________________________________________________________________________  FINDINGS:     Left Ventricle:  Left ventricular systolic function is mildly decreased with an ejection fraction visually estimated at 45 to 50%. There is global left ventricular hypokinesis.     Right Ventricle:  Based on visual assessment, the right ventricle appears mildly enlarged. Borderline reducedsystolic function. Tricuspid annular plane systolic excursion (TAPSE) is 2.3 cm (normal >=1.7 cm). A device lead is visualized in the right heart.     Left Atrium:  The left atrium is moderately dilated with an indexed volume of 45.99 ml/m².     Right Atrium:  The right atrium is dilated in size.     Aortic Valve:  The aortic valve appears trileaflet with reduced systolic excursion. There is calcification of the aortic valve leaflets. There is moderate aortic stenosis. The peak transaortic velocity is 2.61 m/s, peak transaortic gradient is 27.2 mmHg and mean transaortic gradient is 13.0 mmHg with an LVOT/aortic valve VTI ratio of 0.31. The aortic valve area is estimated at 1.09 cm² by the continuity equation. The DEVYN is estimated at 1.1sqcm. There is trace aortic regurgitation.     Mitral Valve:  There is calcification of the mitral valve annulus. Mitral valve leaflets are diffusely calcified. There is symmetric leaflet tethering. There is mild mitral regurgitation.     Tricuspid Valve:  Structurally normal tricuspid valve with normal leaflet excursion. There is moderate to severe tricuspid regurgitation. Estimated pulmonary artery systolic pressure is 78 mmHg, consistent with severe pulmonary hypertension.     Pulmonic Valve:  The pulmonic valve was not well visualized. There is mild pulmonic regurgitation.     Aorta:  The aortic root at the sinuses of Valsalva is normal in size, measuring 3.00 cm (indexed 1.39 cm/m²). The ascending aorta diameter is normal in size, measuring 2.60 cm (indexed 1.21 cm/m²).     Systemic Veins:  The inferior vena cava is dilated measuring 2.67 cm in diameter, (dilated >2.1cm) with abnormal inspiratory collapse (abnormal <50%) consistent with elevated right atrial pressure (~15, range 10-20mmHg).  ____________________________________________________________________  QUANTITATIVE DATA:  Left Ventricle Measurements: (Indexed to BSA)     IVSd (2D):   1.4 cm  LVPWd (2D):  1.3 cm  LVIDd (2D):  5.4 cm  LVIDs (2D):  4.5 cm  LV Mass:     314 g  145.8g/m²  Visualized LV EF%: 45 to 50%     MV E Vmax:    1.71 m/s  MV A Vmax:    0.75 m/s  MV E/A:       2.27  e' lateral:   6.42 cm/s  e' medial:    5.87 cm/s  E/e' lateral: 26.64  E/e' medial:  29.13  E/e' Average: 27.83  MV DT:        161 msec    Aorta Measurements: (normal range) (Indexed to BSA)     Sinuses of Valsalva: 3.00 cm (2.7 - 3.3 cm)  Ao Asc prox:         2.60 cm       Left Atrium Measurements: (Indexed to BSA)  LA Diam 2D: 5.00 cm    Right Ventricle Measurements:     TAPSE:            2.3 cm  RV Base (RVID1):  4.3 cm  RV Mid (RVID2):   3.2 cm  RV Major (RVID3): 7.5 cm       LVOT / RVOT/ Qp/Qs Data: (Indexed to BSA)  LVOT Diameter: 2.10 cm  LVOT Vmax:     1.02 m/s  LVOT VTI:      17.80 cm  LVOT SV:       61.7 ml  28.61 ml/m²    Aortic Valve Measurements:  AV Vmax:                2.6 m/s  AV Peak Gradient:       27.2 mmHg  AV Mean Gradient:       13.0 mmHg  AV VTI:                 56.6 cm  AV VTI Ratio:           0.31  AoV EOA, Contin:        1.09 cm²  AoV EOA, Contin i:      0.51 cm²/m²  AoV Dimensionless Index 0.31    Mitral Valve Measurements:     MV E Vmax: 1.7 m/s         MR Vmax:          5.05 m/s  MV A Vmax: 0.8 m/s         MR Peak Gradient: 102.0 mmHg  MV E/A:    2.3       Tricuspid Valve Measurements:     TR Vmax:      4.0 m/s  TR Peak Gradient: 62.7 mmHg  RA Pressure:      15 mmHg  PASP:             78 mmHg    ________________________________________________________________________________________  Electronically signed on 1/22/2024 at 5:39:34 PM by Serafin Cooper         *** Final ***

## 2024-04-30 NOTE — PROGRESS NOTE ADULT - ASSESSMENT
74 y/o Female w/ PMHx of HTN, Type 2 Diabetes, NICM/HFrEF, Afib/flutter s/p ablation 3/8/12, bradycardia/conduction disease s/p MDT dual-chamber PPM 3/8/12, pHTN, Hypothyroidism, Hyperparathyroidism presented to hospital c/o SOB; admitted for management of HFrEF exacerbation.  Outpatient Cards: Dr. Elils    NICM/HFrEF exacerbation  - Presented w/ SOB, orthopnea, LE edema w/ 40lb weight gain  - TTE 1/2024: LVEF 40-45%, global LV hypokinesis, moderate AS/TR, severe pHTN  - TTE 2/3/24: LVEF 40%, dilated RA/LA, dilated RV/LV, LVH, PPM/ AICD in RA and RV, moderate segmental LV dysfxn w/ overall LVSF mildly reduced, trace AI, mod MR/TR, mild PI, severe pHTN  - Refused repeat ECHO  - BNP:  <--1034  - s/p IV Lasix; however w/ increase in Bicarb so 4/28PM and 4/29AM dose held. Bicarb improving w/ hold on diuresis.  - Continue Lasix 40mg PO qd; if worsening can give Diamox 250mg PO BID x48hrs  - Bicarb:  <--38,  <--41,  <--36,  <--34,  <--34  - Continue GDMT: Coreg 12.5mg BID, Losartan 75 mg qd  - Daily weights, strict I&Os, fluid restriction    - EKG: SR w/ 1st degree AVB, RBBB, LVH; trops negative; no e/o ischemia   - No ischemic eval outpatient per records obtained from Dr. Ellis's office    - Known A fib, s/p ablation 3/8/12  - Rate controlled per flow sheets  - Continue Digoxin 250mcg, Coreg 12.5mg BID and Xarelto 20mg qhs  - Digoxin level 4/26: 1.5  - PPM interrogation 2/29/24: MVPR (AAIR > DDR ); normal device fxn, adequate pacing and sensing thresholds; estimated battery life 9.3yrs    - -170s     - Monitor and replete lytes, keep K>4, Mg>2.  - Will continue to follow.    Flaquita Hansen, MS FNP, AGACNP  Nurse Practitioner- Cardiology   Please call on TEAMS

## 2024-04-30 NOTE — PROGRESS NOTE ADULT - SUBJECTIVE AND OBJECTIVE BOX
Patient is a 73y old  Female who presents with a chief complaint of chf exacerbation (29 Apr 2024 13:37)      INTERVAL HPI/OVERNIGHT EVENTS: No acute overnight events. Pt seen and examined at the bedside this AM. Pt resting comfortably in bed. Pt states the neuropathy in her feet is bothering her and she would like the lidocaine cream and tylenol. Otherwise, no other complaints at this time. Denies cp, sob, abdominal pain.      MEDICATIONS  (STANDING):  artificial tears (preservative free) Ophthalmic Solution 1 Drop(s) Both EYES every 6 hours  carvedilol 12.5 milliGRAM(s) Oral every 12 hours  clotrimazole/betamethasone Cream 1 Application(s) Topical daily  dextrose 10% Bolus 125 milliLiter(s) IV Bolus once  dextrose 5%. 1000 milliLiter(s) (50 mL/Hr) IV Continuous <Continuous>  dextrose 5%. 1000 milliLiter(s) (100 mL/Hr) IV Continuous <Continuous>  dextrose 50% Injectable 25 Gram(s) IV Push once  dextrose 50% Injectable 12.5 Gram(s) IV Push once  digoxin     Tablet 250 MICROGram(s) Oral daily  furosemide    Tablet 40 milliGRAM(s) Oral daily  glucagon  Injectable 1 milliGRAM(s) IntraMuscular once  insulin glargine Injectable (LANTUS) 23 Unit(s) SubCutaneous at bedtime  insulin lispro (ADMELOG) corrective regimen sliding scale   SubCutaneous at bedtime  insulin lispro (ADMELOG) corrective regimen sliding scale   SubCutaneous three times a day before meals  insulin lispro Injectable (ADMELOG) 7 Unit(s) SubCutaneous three times a day before meals  levothyroxine 125 MICROGram(s) Oral daily  lidocaine 4% Cream 1 Application(s) Topical daily  losartan 75 milliGRAM(s) Oral daily  rivaroxaban 20 milliGRAM(s) Oral with dinner    MEDICATIONS  (PRN):  acetaminophen     Tablet .. 650 milliGRAM(s) Oral every 6 hours PRN Temp greater or equal to 38C (100.4F), Mild Pain (1 - 3)  ALPRAZolam 0.25 milliGRAM(s) Oral daily PRN anxiety  aluminum hydroxide/magnesium hydroxide/simethicone Suspension 30 milliLiter(s) Oral every 4 hours PRN Dyspepsia  dextrose Oral Gel 15 Gram(s) Oral once PRN Blood Glucose LESS THAN 70 milliGRAM(s)/deciliter  melatonin 3 milliGRAM(s) Oral at bedtime PRN Insomnia  ondansetron Injectable 4 milliGRAM(s) IV Push every 8 hours PRN Nausea and/or Vomiting  zolpidem 5 milliGRAM(s) Oral at bedtime PRN Insomnia      Allergies    penicillin (Short breath; Rash)  statins (Vomiting)  erythromycin (Rash)  Synjardy (Other (Severe))  nystatin topical (Hives; Rash)  vancomycin (Short breath; Rash)    Intolerances        REVIEW OF SYSTEMS:  CONSTITUTIONAL: No fever or chills  HEENT:  No headache, no sore throat  RESPIRATORY: No cough, wheezing, or shortness of breath  CARDIOVASCULAR: No chest pain, palpitations  GASTROINTESTINAL: No abd pain, nausea, vomiting, or diarrhea  GENITOURINARY: No dysuria, frequency, or hematuria  NEUROLOGICAL: no focal weakness or dizziness  MUSCULOSKELETAL: no myalgias     Vital Signs Last 24 Hrs  T(C): 36.6 (30 Apr 2024 04:08), Max: 36.9 (29 Apr 2024 11:42)  T(F): 97.8 (30 Apr 2024 04:08), Max: 98.4 (29 Apr 2024 11:42)  HR: 61 (30 Apr 2024 04:08) (61 - 64)  BP: 177/87 (30 Apr 2024 04:08) (143/72 - 177/87)  BP(mean): --  RR: 18 (30 Apr 2024 04:08) (18 - 18)  SpO2: 91% (30 Apr 2024 04:08) (91% - 91%)    Parameters below as of 30 Apr 2024 04:08  Patient On (Oxygen Delivery Method): room air        PHYSICAL EXAM:  GENERAL: NAD, disheveled  HEENT:  anicteric, moist mucous membranes; poor dentition  CHEST/LUNG:  decreased breath sounds b/l  HEART:  RRR, S1, S2  ABDOMEN:  BS+, soft, nontender, nondistended  EXTREMITIES: LE edema bilaterally, legs wrapped in bandage  NERVOUS SYSTEM: answers questions and follows commands appropriately    LABS:                        12.4   3.87  )-----------( 145      ( 30 Apr 2024 07:50 )             39.4     CBC Full  -  ( 30 Apr 2024 07:50 )  WBC Count : 3.87 K/uL  Hemoglobin : 12.4 g/dL  Hematocrit : 39.4 %  Platelet Count - Automated : 145 K/uL  Mean Cell Volume : 90.6 fl  Mean Cell Hemoglobin : 28.5 pg  Mean Cell Hemoglobin Concentration : 31.5 gm/dL  Auto Neutrophil # : x  Auto Lymphocyte # : x  Auto Monocyte # : x  Auto Eosinophil # : x  Auto Basophil # : x  Auto Neutrophil % : x  Auto Lymphocyte % : x  Auto Monocyte % : x  Auto Eosinophil % : x  Auto Basophil % : x      Ca    9.3        29 Apr 2024 06:05        Urinalysis Basic - ( 29 Apr 2024 06:05 )    Color: x / Appearance: x / SG: x / pH: x  Gluc: 199 mg/dL / Ketone: x  / Bili: x / Urobili: x   Blood: x / Protein: x / Nitrite: x   Leuk Esterase: x / RBC: x / WBC x   Sq Epi: x / Non Sq Epi: x / Bacteria: x      CAPILLARY BLOOD GLUCOSE      POCT Blood Glucose.: 204 mg/dL (30 Apr 2024 07:53)  POCT Blood Glucose.: 172 mg/dL (29 Apr 2024 21:22)  POCT Blood Glucose.: 169 mg/dL (29 Apr 2024 17:04)  POCT Blood Glucose.: 267 mg/dL (29 Apr 2024 12:20)          RADIOLOGY & ADDITIONAL TESTS:  Personally reviewed.     Consultant(s) Notes Reviewed:  [x] YES  [ ] NO

## 2024-04-30 NOTE — PROGRESS NOTE ADULT - SUBJECTIVE AND OBJECTIVE BOX
Patient is a 73y old  Female who presents with a chief complaint of chf exacerbation (2024 10:52)       HPI:  73 year old female with a past medical history of atrial fibrillation on Xarelto, bradycardia status post pacemaker, hypertension, diabetes, cardiomyopathy, pulmonary hypertension is presenting with lower extremity edema.  States that this similar presentation happened to her previously when she was admitted to the hospital.  She states her LE edema improved after discharge in January but then has been worsening for the past month. She endorse some shortness of breath and difficulty laying flat as well. with her symptoms.  Denies any chest pain.  States that she saw her endocrinologist and was told she had gained 35 pounds in water weight due to the swelling in her legs. She states her legs do feel uncomfortable States that ambulation has been more difficult for her recently due to her symptoms.   She states denies any other complaints. She follows with Cardio Dr. Yvan Ellis. Last TTE in 2024 showed EF 45-50%.   Urinating without issue.  No N/V.  Has not seen nephrologist in the past.        PAST MEDICAL & SURGICAL HISTORY:  Hypothyroid      DM (diabetes mellitus)      HTN (hypertension)      Atrial fibrillation and flutter  s/p ablation      Bradycardia  s/p MDT PPM      H/O pulmonary hypertension      H/O hyperparathyroidism      H/O cardiomyopathy      Cardiac pacemaker      H/O cardiac radiofrequency ablation      H/O carpal tunnel repair      History of parathyroid surgery           FAMILY HISTORY:  No pertinent family history in first degree relatives    [place]    Social History:Non smoker    MEDICATIONS  (STANDING):  artificial tears (preservative free) Ophthalmic Solution 1 Drop(s) Both EYES every 6 hours  carvedilol 12.5 milliGRAM(s) Oral every 12 hours  clotrimazole/betamethasone Cream 1 Application(s) Topical daily  dextrose 10% Bolus 125 milliLiter(s) IV Bolus once  dextrose 5%. 1000 milliLiter(s) (50 mL/Hr) IV Continuous <Continuous>  dextrose 5%. 1000 milliLiter(s) (100 mL/Hr) IV Continuous <Continuous>  dextrose 50% Injectable 25 Gram(s) IV Push once  dextrose 50% Injectable 12.5 Gram(s) IV Push once  digoxin     Tablet 250 MICROGram(s) Oral daily  furosemide    Tablet 40 milliGRAM(s) Oral daily  glucagon  Injectable 1 milliGRAM(s) IntraMuscular once  insulin glargine Injectable (LANTUS) 23 Unit(s) SubCutaneous at bedtime  insulin lispro (ADMELOG) corrective regimen sliding scale   SubCutaneous at bedtime  insulin lispro (ADMELOG) corrective regimen sliding scale   SubCutaneous three times a day before meals  insulin lispro Injectable (ADMELOG) 7 Unit(s) SubCutaneous three times a day before meals  levothyroxine 125 MICROGram(s) Oral daily  lidocaine 4% Cream 1 Application(s) Topical daily  losartan 75 milliGRAM(s) Oral daily  rivaroxaban 20 milliGRAM(s) Oral with dinner    MEDICATIONS  (PRN):  acetaminophen     Tablet .. 650 milliGRAM(s) Oral every 6 hours PRN Temp greater or equal to 38C (100.4F), Mild Pain (1 - 3)  ALPRAZolam 0.25 milliGRAM(s) Oral daily PRN anxiety  aluminum hydroxide/magnesium hydroxide/simethicone Suspension 30 milliLiter(s) Oral every 4 hours PRN Dyspepsia  dextrose Oral Gel 15 Gram(s) Oral once PRN Blood Glucose LESS THAN 70 milliGRAM(s)/deciliter  melatonin 3 milliGRAM(s) Oral at bedtime PRN Insomnia  ondansetron Injectable 4 milliGRAM(s) IV Push every 8 hours PRN Nausea and/or Vomiting  zolpidem 5 milliGRAM(s) Oral at bedtime PRN Insomnia      Allergies    penicillin (Short breath; Rash)  statins (Vomiting)  erythromycin (Rash)  Synjardy (Other (Severe))  nystatin topical (Hives; Rash)  vancomycin (Short breath; Rash)    Intolerances         REVIEW OF SYSTEMS:    Constitutional: Denies fatigue  HEENT: Denies headaches and dizziness  Respiratory: denies SOB, cough, or wheezing  Cardiovascular: denies CP, palpitations  Gastrointestinal: Denies nausea, denies vomiting, diarrhea, constipation, abdominal pain, or bloody stools  Genitourinary: denies painful urination, increased frequency, urgency, or bloody urine  Skin: denies rashes or itching  Musculoskeletal: denies muscle aches, joint swelling  Neurologic: Denies generalized weakness, denies loss of sensation, numbness, or tingling      Vital Signs Last 24 Hrs  T(C): 36.6 (2024 04:08), Max: 36.9 (2024 11:42)  T(F): 97.8 (2024 04:08), Max: 98.4 (2024 11:42)  HR: 61 (2024 04:08) (61 - 64)  BP: 177/87 (2024 04:08) (143/72 - 177/87)  BP(mean): --  RR: 18 (2024 04:08) (18 - 18)  SpO2: 91% (2024 04:08) (91% - 91%)    Parameters below as of 2024 04:08  Patient On (Oxygen Delivery Method): room air          Daily     Daily Weight in k.5 (2024 04:40)    PHYSICAL EXAM:    GENERAL: NAD  HEAD:  Atraumatic, Normocephalic  EYES: EOMI, conjunctiva and sclera clear  ENMT: No Drainage from nares, No drainage from ears  NERVOUS SYSTEM:  Awake and Alert  CHEST/LUNG: Clear to percussion bilaterally  EXTREMITIES:  No Edema        LABS:                                           12.4   3.87  )-----------( 145      ( 2024 07:50 )             39.4     04-30    140  |  96  |  19  ----------------------------<  221<H>  3.6   |  40<H>  |  0.82    Ca    9.4      2024 07:50  Phos  3.7     04-29  Mg     1.9     04-29    TPro  6.9  /  Alb  3.0<L>  /  TBili  0.9  /  DBili  x   /  AST  18  /  ALT  13  /  AlkPhos  101  04-30      Urinalysis Basic - ( 2024 07:50 )    Color: x / Appearance: x / SG: x / pH: x  Gluc: 221 mg/dL / Ketone: x  / Bili: x / Urobili: x   Blood: x / Protein: x / Nitrite: x   Leuk Esterase: x / RBC: x / WBC x   Sq Epi: x / Non Sq Epi: x / Bacteria: x

## 2024-04-30 NOTE — PROGRESS NOTE ADULT - PROBLEM SELECTOR PLAN 1
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)  - TTE 1/2024 LVEF 45-50%  - now with LE pitting edema, sob and orthopnea  - f/u TTE - pending  - continue home Coreg  - s/p Lasix 40mg IVP BID - given metolazone x 1 on 4/27 as a trial (with good effect)  - Increase losartan to 75mg QD for GDMT - can uptitrate - was on 75mg daily on DC but was told by follow up that this did not to be refilled - watch renal indices closely  - developed contraction alkalosis on loop diuretic  - d/c standing lasix, will add diamox if bicarb continues to worsen   - Bicarb improved a bit today - d/w cardio - will give Lasix 40 IVP x1 and reassess daily - needs IV diuretics  - pt w/ weeping LE wounds -- wound care RN consulted, Dr. 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)  - TTE 1/2024 LVEF 45-50%  - now with LE pitting edema, sob and orthopnea  - f/u TTE - pending  - continue home Coreg  - s/p Lasix 40mg IVP BID - given metolazone x 1 on 4/27 as a trial (with good effect)  - Increase losartan to 75mg QD for GDMT - can uptitrate - was on 75mg daily on DC but was told by follow up that this did not to be refilled - watch renal indices closely  - developed contraction alkalosis on loop diuretic  - d/c standing lasix  - Bicarb worsened today - will give diamox 250mg today  - reassess use of diuretic daily  - pt w/ weeping LE wounds -- wound care RN consulted, Dr. Dumont currently away  - Cardio following, recs appreciated

## 2024-05-01 DIAGNOSIS — E11.65 TYPE 2 DIABETES MELLITUS WITH HYPERGLYCEMIA: ICD-10-CM

## 2024-05-01 LAB
ALBUMIN SERPL ELPH-MCNC: 2.9 G/DL — LOW (ref 3.3–5)
ALP SERPL-CCNC: 97 U/L — SIGNIFICANT CHANGE UP (ref 40–120)
ALT FLD-CCNC: 12 U/L — SIGNIFICANT CHANGE UP (ref 12–78)
ANION GAP SERPL CALC-SCNC: 3 MMOL/L — LOW (ref 5–17)
AST SERPL-CCNC: 18 U/L — SIGNIFICANT CHANGE UP (ref 15–37)
BILIRUB SERPL-MCNC: 0.9 MG/DL — SIGNIFICANT CHANGE UP (ref 0.2–1.2)
BUN SERPL-MCNC: 19 MG/DL — SIGNIFICANT CHANGE UP (ref 7–23)
CALCIUM SERPL-MCNC: 9 MG/DL — SIGNIFICANT CHANGE UP (ref 8.5–10.1)
CHLORIDE SERPL-SCNC: 99 MMOL/L — SIGNIFICANT CHANGE UP (ref 96–108)
CO2 SERPL-SCNC: 39 MMOL/L — HIGH (ref 22–31)
CREAT SERPL-MCNC: 0.72 MG/DL — SIGNIFICANT CHANGE UP (ref 0.5–1.3)
EGFR: 87 ML/MIN/1.73M2 — SIGNIFICANT CHANGE UP
GLUCOSE SERPL-MCNC: 219 MG/DL — HIGH (ref 70–99)
HCT VFR BLD CALC: 39.5 % — SIGNIFICANT CHANGE UP (ref 34.5–45)
HGB BLD-MCNC: 12.1 G/DL — SIGNIFICANT CHANGE UP (ref 11.5–15.5)
MCHC RBC-ENTMCNC: 28.1 PG — SIGNIFICANT CHANGE UP (ref 27–34)
MCHC RBC-ENTMCNC: 30.6 GM/DL — LOW (ref 32–36)
MCV RBC AUTO: 91.9 FL — SIGNIFICANT CHANGE UP (ref 80–100)
NRBC # BLD: 0 /100 WBCS — SIGNIFICANT CHANGE UP (ref 0–0)
PLATELET # BLD AUTO: 145 K/UL — LOW (ref 150–400)
POTASSIUM SERPL-MCNC: 3.6 MMOL/L — SIGNIFICANT CHANGE UP (ref 3.5–5.3)
POTASSIUM SERPL-SCNC: 3.6 MMOL/L — SIGNIFICANT CHANGE UP (ref 3.5–5.3)
PROT SERPL-MCNC: 6.8 G/DL — SIGNIFICANT CHANGE UP (ref 6–8.3)
RBC # BLD: 4.3 M/UL — SIGNIFICANT CHANGE UP (ref 3.8–5.2)
RBC # FLD: 15 % — HIGH (ref 10.3–14.5)
SODIUM SERPL-SCNC: 141 MMOL/L — SIGNIFICANT CHANGE UP (ref 135–145)
WBC # BLD: 3.73 K/UL — LOW (ref 3.8–10.5)
WBC # FLD AUTO: 3.73 K/UL — LOW (ref 3.8–10.5)

## 2024-05-01 PROCEDURE — 99222 1ST HOSP IP/OBS MODERATE 55: CPT

## 2024-05-01 PROCEDURE — 99233 SBSQ HOSP IP/OBS HIGH 50: CPT | Mod: GC

## 2024-05-01 PROCEDURE — 99232 SBSQ HOSP IP/OBS MODERATE 35: CPT

## 2024-05-01 RX ORDER — LORATADINE 10 MG/1
10 TABLET ORAL DAILY
Refills: 0 | Status: DISCONTINUED | OUTPATIENT
Start: 2024-05-01 | End: 2024-05-02

## 2024-05-01 RX ORDER — ACETAZOLAMIDE 250 MG/1
250 TABLET ORAL EVERY 12 HOURS
Refills: 0 | Status: COMPLETED | OUTPATIENT
Start: 2024-05-01 | End: 2024-05-01

## 2024-05-01 RX ORDER — INSULIN LISPRO 100/ML
VIAL (ML) SUBCUTANEOUS
Refills: 0 | Status: DISCONTINUED | OUTPATIENT
Start: 2024-05-01 | End: 2024-05-02

## 2024-05-01 RX ORDER — FUROSEMIDE 40 MG
40 TABLET ORAL ONCE
Refills: 0 | Status: COMPLETED | OUTPATIENT
Start: 2024-05-01 | End: 2024-05-01

## 2024-05-01 RX ORDER — INSULIN LISPRO 100/ML
10 VIAL (ML) SUBCUTANEOUS
Refills: 0 | Status: DISCONTINUED | OUTPATIENT
Start: 2024-05-01 | End: 2024-05-02

## 2024-05-01 RX ORDER — INSULIN GLARGINE 100 [IU]/ML
30 INJECTION, SOLUTION SUBCUTANEOUS AT BEDTIME
Refills: 0 | Status: DISCONTINUED | OUTPATIENT
Start: 2024-05-01 | End: 2024-05-02

## 2024-05-01 RX ORDER — LIDOCAINE 4 G/100G
1 CREAM TOPICAL DAILY
Refills: 0 | Status: DISCONTINUED | OUTPATIENT
Start: 2024-05-01 | End: 2024-05-02

## 2024-05-01 RX ADMIN — LIDOCAINE 1 PATCH: 4 CREAM TOPICAL at 12:04

## 2024-05-01 RX ADMIN — Medication 250 MICROGRAM(S): at 05:13

## 2024-05-01 RX ADMIN — Medication 1 DROP(S): at 05:12

## 2024-05-01 RX ADMIN — Medication 650 MILLIGRAM(S): at 16:30

## 2024-05-01 RX ADMIN — ACETAZOLAMIDE 250 MILLIGRAM(S): 250 TABLET ORAL at 18:46

## 2024-05-01 RX ADMIN — Medication 650 MILLIGRAM(S): at 17:00

## 2024-05-01 RX ADMIN — CLOTRIMAZOLE AND BETAMETHASONE DIPROPIONATE 1 APPLICATION(S): 10; .5 CREAM TOPICAL at 12:52

## 2024-05-01 RX ADMIN — ZOLPIDEM TARTRATE 5 MILLIGRAM(S): 10 TABLET ORAL at 21:18

## 2024-05-01 RX ADMIN — Medication 1 DROP(S): at 21:24

## 2024-05-01 RX ADMIN — ACETAZOLAMIDE 250 MILLIGRAM(S): 250 TABLET ORAL at 12:06

## 2024-05-01 RX ADMIN — INSULIN GLARGINE 30 UNIT(S): 100 INJECTION, SOLUTION SUBCUTANEOUS at 21:23

## 2024-05-01 RX ADMIN — Medication 1 DROP(S): at 17:38

## 2024-05-01 RX ADMIN — CARVEDILOL PHOSPHATE 12.5 MILLIGRAM(S): 80 CAPSULE, EXTENDED RELEASE ORAL at 18:47

## 2024-05-01 RX ADMIN — Medication 1 DROP(S): at 12:06

## 2024-05-01 RX ADMIN — Medication 4: at 17:38

## 2024-05-01 RX ADMIN — Medication 10 UNIT(S): at 17:37

## 2024-05-01 RX ADMIN — Medication 10 UNIT(S): at 12:05

## 2024-05-01 RX ADMIN — Medication 1: at 12:06

## 2024-05-01 RX ADMIN — ACETAZOLAMIDE 250 MILLIGRAM(S): 250 TABLET ORAL at 15:23

## 2024-05-01 RX ADMIN — Medication 125 MICROGRAM(S): at 05:13

## 2024-05-01 RX ADMIN — CARVEDILOL PHOSPHATE 12.5 MILLIGRAM(S): 80 CAPSULE, EXTENDED RELEASE ORAL at 05:13

## 2024-05-01 RX ADMIN — Medication 40 MILLIGRAM(S): at 12:07

## 2024-05-01 RX ADMIN — LIDOCAINE 1 PATCH: 4 CREAM TOPICAL at 19:58

## 2024-05-01 RX ADMIN — Medication 2: at 21:53

## 2024-05-01 RX ADMIN — Medication 4: at 08:21

## 2024-05-01 RX ADMIN — LOSARTAN POTASSIUM 75 MILLIGRAM(S): 100 TABLET, FILM COATED ORAL at 05:13

## 2024-05-01 RX ADMIN — LORATADINE 10 MILLIGRAM(S): 10 TABLET ORAL at 12:07

## 2024-05-01 RX ADMIN — RIVAROXABAN 20 MILLIGRAM(S): KIT at 18:46

## 2024-05-01 RX ADMIN — Medication 7 UNIT(S): at 08:20

## 2024-05-01 NOTE — PROGRESS NOTE ADULT - ASSESSMENT
73 year old female with a past medical history of atrial fibrillation on Xarelto, bradycardia status post pacemaker, hypertension, diabetes, cardiomyopathy, pulmonary hypertension is presenting with lower extremity edema. Admit for acute decompensated congestive heart failure.
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
## 2024-05-01 NOTE — PROGRESS NOTE ADULT - ASSESSMENT
Metabolic Alkalosis  CKD 2  Edema  HTN  CHF    -Metabolic Alkalosis likely from diuretic and contraction  -Started on losartan  -Alkalosis improving s/p diamox; can give additional 2 doses today  -Agree with starting lasix again  -Cardiology note reviewed  -Renal indices are stable    DC planning

## 2024-05-01 NOTE — PROGRESS NOTE ADULT - SUBJECTIVE AND OBJECTIVE BOX
[facility] -- Josue Moore Pannella, Patel, Savella Goodger, Cohen  Office # [phone]      Follow Up:    CHF     Subjective/Observations:    Patient seen and examined. Patient awake, alert, resting in bed. No complaints of chest pain, dyspnea, palpitations or dizziness. Tolerating O2 via nasal cannula. Reports foot pain. + LE edema with ace wraps       REVIEW OF SYSTEMS: All other review of systems is negative unless indicated above    PAST MEDICAL & SURGICAL HISTORY:  Hypothyroid      DM (diabetes mellitus)      HTN (hypertension)      Atrial fibrillation and flutter  s/p ablation      Bradycardia  s/p MDT PPM      H/O pulmonary hypertension      H/O hyperparathyroidism      H/O cardiomyopathy      Cardiac pacemaker      H/O cardiac radiofrequency ablation      H/O carpal tunnel repair      History of parathyroid surgery          MEDICATIONS  (STANDING):  acetaZOLAMIDE    Tablet 250 milliGRAM(s) Oral once  artificial tears (preservative free) Ophthalmic Solution 1 Drop(s) Both EYES every 6 hours  carvedilol 12.5 milliGRAM(s) Oral every 12 hours  clotrimazole/betamethasone Cream 1 Application(s) Topical daily  dextrose 10% Bolus 125 milliLiter(s) IV Bolus once  dextrose 5%. 1000 milliLiter(s) (50 mL/Hr) IV Continuous <Continuous>  dextrose 5%. 1000 milliLiter(s) (100 mL/Hr) IV Continuous <Continuous>  dextrose 50% Injectable 25 Gram(s) IV Push once  dextrose 50% Injectable 12.5 Gram(s) IV Push once  digoxin     Tablet 250 MICROGram(s) Oral daily  glucagon  Injectable 1 milliGRAM(s) IntraMuscular once  levothyroxine 125 MICROGram(s) Oral daily  lidocaine   4% Patch 1 Patch Transdermal daily  losartan 75 milliGRAM(s) Oral daily  rivaroxaban 20 milliGRAM(s) Oral with dinner    MEDICATIONS  (PRN):  acetaminophen     Tablet .. 650 milliGRAM(s) Oral every 6 hours PRN Temp greater or equal to 38C (100.4F), Mild Pain (1 - 3)  ALPRAZolam 0.25 milliGRAM(s) Oral daily PRN anxiety  aluminum hydroxide/magnesium hydroxide/simethicone Suspension 30 milliLiter(s) Oral every 4 hours PRN Dyspepsia  dextrose Oral Gel 15 Gram(s) Oral once PRN Blood Glucose LESS THAN 70 milliGRAM(s)/deciliter  lidocaine 4% Cream 1 Application(s) Topical two times a day PRN pain in feet  melatonin 3 milliGRAM(s) Oral at bedtime PRN Insomnia  ondansetron Injectable 4 milliGRAM(s) IV Push every 8 hours PRN Nausea and/or Vomiting  zolpidem 5 milliGRAM(s) Oral at bedtime PRN Insomnia      Allergies    penicillin (Short breath; Rash)  statins (Vomiting)  erythromycin (Rash)  Synjardy (Other (Severe))  nystatin topical (Hives; Rash)  vancomycin (Short breath; Rash)    Intolerances        Vital Signs Last 24 Hrs  T(C): 36.4 (01 May 2024 04:10), Max: 36.6 (2024 20:54)  T(F): 97.6 (01 May 2024 04:10), Max: 97.9 (2024 20:54)  HR: 59 (01 May 2024 04:10) (59 - 62)  BP: 176/83 (01 May 2024 04:10) (148/75 - 176/83)  BP(mean): --  RR: 18 (01 May 2024 04:10) (18 - 19)  SpO2: 92% (01 May 2024 04:10) (92% - 92%)    Parameters below as of 01 May 2024 04:10  Patient On (Oxygen Delivery Method): room air        I&O's Summary    2024 07:01  -  01 May 2024 07:00  --------------------------------------------------------  IN: 0 mL / OUT: 900 mL / NET: -900 mL          PHYSICAL EXAM:  TELE: paced  Constitutional: NAD, awake and alert, well-developed  HEENT: Moist Mucous Membranes, Anicteric  Pulmonary: Non-labored, breath sounds are dim   Cardiovascular: Regular, S1 and S2 nl, No murmurs, rubs, gallops or clicks  Gastrointestinal: Bowel Sounds present, soft, nontender.   Lymph: + edema bl le , ace wraps in place   Skin: No visible rashes or ulcers.  Psych:  Mood & affect appropriate    LABS: All Labs Reviewed:                           3  )-----------( 145      ( 01 May 2024 06:18 )             39.5                         12.4   3.87  )-----------( 145      ( 2024 07:50 )             39.4                         12.2   4.52  )-----------( 151      ( 2024 06:05 )             39.3     01 May 2024 06:18    141    |  99     |  19     ----------------------------<  219    3.6     |  39     |  0.72   2024 07:50    140    |  96     |  19     ----------------------------<  221    3.6     |  40     |  0.82   2024 06:05    140    |  96     |  16     ----------------------------<  199    3.5     |  38     |  0.75     Ca    9.0        01 May 2024 06:18  Ca    9.4        2024 07:50  Ca    9.3        2024 06:05  Phos  3.7       2024 06:05  Mg     1.9       2024 06:05    TPro  6.8    /  Alb  2.9    /  TBili  0.9    /  DBili  x      /  AST  18     /  ALT  12     /  AlkPhos  97     01 May 2024 06:18  TPro  6.9    /  Alb  3.0    /  TBili  0.9    /  DBili  x      /  AST  18     /  ALT  13     /  AlkPhos  101    2024 07:50  TPro  6.8    /  Alb  2.9    /  TBili  0.8    /  DBili  x      /  AST  14     /  ALT  14     /  AlkPhos  97     2024 06:05             EC Lead ECG:   Ventricular Rate 61 BPM    Atrial Rate 61 BPM    P-R Interval 432 ms    QRS Duration 174 ms    Q-T Interval 450 ms    QTC Calculation(Bazett) 453 ms    R Axis -50 degrees    T Axis 126 degrees    Diagnosis Line Sinus rhythm with 1st degree AV block  atrial-paced complexes  Right bundle branch block  Left anterior fascicular block  *** Bifascicular block ***  Left ventricular hypertrophy with repolarization abnormality ( R in aVL )    Confirmed by USHA BEE ([id number]) on 2024 7:51:43 AM (24 @ 16:56)      TRANSTHORACIC ECHOCARDIOGRAM REPORT  ________________________________________________________________________________                                      _______       Pt. Name:       ROSITA GUTIERREZ Study Date:    2024  MRN:            [id number]          YOB: 1950  Accession #:    [id number]         Age:           73 years  Account#:       [id number]        Gender:        F  Heart Rate:                       Height:        ( )  Rhythm:                           Weight:   220.46 lb (100.00 kg)  Blood Pressure: 160/61 mmHg       BSA/BMI:       2.15 m² /  ________________________________________________________________________________________  Referring Physician:    [id number] Lexx Cuenca  Interpreting Physician: Serafin Cooper  Primary Sonographer:    Bernabe Heredia    CPT:               ECHO TTE WO CON COMP W DOPP - 10189.m  Indication(s):     Dyspnea, unspecified - R06.00  Procedure:         Transthoracic echocardiogram with 2-D, M-mode and complete          spectral and color flow Doppler.  Ordering Location: [facility]  Admission Status:  Inpatient  Study Information: Image quality for this study is technically difficult.    _______________________________________________________________________________________     CONCLUSIONS:      1. Technically difficult image quality.   2. Left ventricular systolic function is mildly decreased with an ejection fraction visually estimated at 45 to 50 %. Global left ventricular hypokinesis.   3. Based on visualassessment, the right ventricle appears mildly enlarged. borderline reduced systolic function.   4. Device lead is visualized in the right heart.   5. The left atrium is moderately dilated.   6. The right atrium is dilated in size.   7. Symmetric mitral valve leaflet tethering.   8. Mild mitral regurgitation.   9. Trileaflet aortic valve with reduced systolic excursion. There is calcification of the aortic valve leaflets. moderate aortic stenosis.  10. The DEVYN is estimated at 1.1sqcm.  11. Moderate to severe tricuspid regurgitation.  12. Estimated pulmonary artery systolic pressure is 78 mmHg, consistent with severe pulmonary hypertension.  13. The inferior vena cava is dilated measuring 2.67 cm in diameter, (dilated >2.1cm) with abnormal inspiratory collapse (abnormal <50%) consistent with elevated right atrial pressure (~15, range 10-20mmHg).    ________________________________________________________________________________________  FINDINGS:     Left Ventricle:  Left ventricular systolic function is mildly decreased with an ejection fraction visually estimated at 45 to 50%. There is global left ventricular hypokinesis.     Right Ventricle:  Based on visual assessment, the right ventricle appears mildly enlarged. Borderline reducedsystolic function. Tricuspid annular plane systolic excursion (TAPSE) is 2.3 cm (normal >=1.7 cm). A device lead is visualized in the right heart.     Left Atrium:  The left atrium is moderately dilated with an indexed volume of 45.99 ml/m².     Right Atrium:  The right atrium is dilated in size.     Aortic Valve:  The aortic valve appears trileaflet with reduced systolic excursion. There is calcification of the aortic valve leaflets. There is moderate aortic stenosis. The peak transaortic velocity is 2.61 m/s, peak transaortic gradient is 27.2 mmHg and mean transaortic gradient is 13.0 mmHg with an LVOT/aortic valve VTI ratio of 0.31. The aortic valve area is estimated at 1.09 cm² by the continuity equation. The DEVYN is estimated at 1.1sqcm. There is trace aortic regurgitation.     Mitral Valve:  There is calcification of the mitral valve annulus. Mitral valve leaflets are diffusely calcified. There is symmetric leaflet tethering. There is mild mitral regurgitation.     Tricuspid Valve:  Structurally normal tricuspid valve with normal leaflet excursion. There is moderate to severe tricuspid regurgitation. Estimated pulmonary artery systolic pressure is 78 mmHg, consistent with severe pulmonary hypertension.     Pulmonic Valve:  The pulmonic valve was not well visualized. There is mild pulmonic regurgitation.     Aorta:  The aortic root at the sinuses of Valsalva is normal in size, measuring 3.00 cm (indexed 1.39 cm/m²). The ascending aorta diameter is normal in size, measuring 2.60 cm (indexed 1.21 cm/m²).     Systemic Veins:  The inferior vena cava is dilated measuring 2.67 cm in diameter, (dilated >2.1cm) with abnormal inspiratory collapse (abnormal <50%) consistent with elevated right atrial pressure (~15, range 10-20mmHg).  ____________________________________________________________________  QUANTITATIVE DATA:  Left Ventricle Measurements: (Indexed to BSA)     IVSd (2D):   1.4 cm  LVPWd (2D):  1.3 cm  LVIDd (2D):  5.4 cm  LVIDs (2D):  4.5 cm  LV Mass:     314 g  145.8g/m²  Visualized LV EF%: 45 to 50%     MV E Vmax:    1.71 m/s  MV A Vmax:    0.75 m/s  MV E/A:       2.27  e' lateral:   6.42 cm/s  e' medial:    5.87 cm/s  E/e' lateral: 26.64  E/e' medial:  29.13  E/e' Average: 27.83  MV DT:        161 msec    Aorta Measurements: (normal range) (Indexed to BSA)     Sinuses of Valsalva: 3.00 cm (2.7 - 3.3 cm)  Ao Asc prox:         2.60 cm       Left Atrium Measurements: (Indexed to BSA)  LA Diam 2D: 5.00 cm    Right Ventricle Measurements:     TAPSE:            2.3 cm  RV Base (RVID1):  4.3 cm  RV Mid (RVID2):   3.2 cm  RV Major (RVID3): 7.5 cm       LVOT / RVOT/ Qp/Qs Data: (Indexed to BSA)  LVOT Diameter: 2.10 cm  LVOT Vmax:     1.02 m/s  LVOT VTI:      17.80 cm  LVOT SV:       61.7 ml  28.61 ml/m²    Aortic Valve Measurements:  AV Vmax:                2.6 m/s  AV Peak Gradient:       27.2 mmHg  AV Mean Gradient:       13.0 mmHg  AV VTI:                 56.6 cm  AV VTI Ratio:           0.31  AoV EOA, Contin:        1.09 cm²  AoV EOA, Contin i:      0.51 cm²/m²  AoV Dimensionless Index 0.31    Mitral Valve Measurements:     MV E Vmax: 1.7 m/s         MR Vmax:          5.05 m/s  MV A Vmax: 0.8 m/s         MR Peak Gradient: 102.0 mmHg  MV E/A:    2.3       Tricuspid Valve Measurements:     TR Vmax:      4.0 m/s  TR Peak Gradient: 62.7 mmHg  RA Pressure:      15 mmHg  PASP:             78 mmHg    ________________________________________________________________________________________  Electronically signed on 2024 at 5:39:34 PM by Serafin Cooper         *** Final ***      Radiology:

## 2024-05-01 NOTE — CONSULT NOTE ADULT - PROBLEM SELECTOR PROBLEM 1
Venous stasis ulcer of other part of left lower leg without varicose veins
Type 2 diabetes mellitus with hyperglycemia

## 2024-05-01 NOTE — PROGRESS NOTE ADULT - PROBLEM SELECTOR PLAN 6
-chronic  - home med: xanax 0.25mg prn daily  - will continue prn xanax  - clearly has some adjustment/depressed mood - pt declining psych consult for now as she does not want extra medications - states that she would be more amenable for a therapist.

## 2024-05-01 NOTE — PROGRESS NOTE ADULT - SUBJECTIVE AND OBJECTIVE BOX
Patient is a 73y old  Female who presents with a chief complaint of chf exacerbation (01 May 2024 14:47)      Subjective:  INTERVAL HPI/OVERNIGHT EVENTS: Patient seen and examined at bedside. No overnight events occurred. Patient complains of post nasal drip. Denies fevers, headache, lightheadedness, chest pain, dyspnea, abdominal pain, n/v/d/c.    MEDICATIONS  (STANDING):  acetaZOLAMIDE    Tablet 250 milliGRAM(s) Oral every 12 hours  acetaZOLAMIDE    Tablet 250 milliGRAM(s) Oral once  artificial tears (preservative free) Ophthalmic Solution 1 Drop(s) Both EYES every 6 hours  carvedilol 12.5 milliGRAM(s) Oral every 12 hours  clotrimazole/betamethasone Cream 1 Application(s) Topical daily  dextrose 10% Bolus 125 milliLiter(s) IV Bolus once  dextrose 5%. 1000 milliLiter(s) (50 mL/Hr) IV Continuous <Continuous>  dextrose 5%. 1000 milliLiter(s) (100 mL/Hr) IV Continuous <Continuous>  dextrose 50% Injectable 25 Gram(s) IV Push once  dextrose 50% Injectable 12.5 Gram(s) IV Push once  digoxin     Tablet 250 MICROGram(s) Oral daily  glucagon  Injectable 1 milliGRAM(s) IntraMuscular once  insulin glargine Injectable (LANTUS) 30 Unit(s) SubCutaneous at bedtime  insulin lispro (ADMELOG) corrective regimen sliding scale   SubCutaneous Before meals and at bedtime  insulin lispro Injectable (ADMELOG) 10 Unit(s) SubCutaneous three times a day before meals  levothyroxine 125 MICROGram(s) Oral daily  lidocaine   4% Patch 1 Patch Transdermal daily  loratadine 10 milliGRAM(s) Oral daily  losartan 75 milliGRAM(s) Oral daily  rivaroxaban 20 milliGRAM(s) Oral with dinner    MEDICATIONS  (PRN):  acetaminophen     Tablet .. 650 milliGRAM(s) Oral every 6 hours PRN Temp greater or equal to 38C (100.4F), Mild Pain (1 - 3)  ALPRAZolam 0.25 milliGRAM(s) Oral daily PRN anxiety  aluminum hydroxide/magnesium hydroxide/simethicone Suspension 30 milliLiter(s) Oral every 4 hours PRN Dyspepsia  dextrose Oral Gel 15 Gram(s) Oral once PRN Blood Glucose LESS THAN 70 milliGRAM(s)/deciliter  lidocaine 4% Cream 1 Application(s) Topical two times a day PRN pain in feet  melatonin 3 milliGRAM(s) Oral at bedtime PRN Insomnia  ondansetron Injectable 4 milliGRAM(s) IV Push every 8 hours PRN Nausea and/or Vomiting  zolpidem 5 milliGRAM(s) Oral at bedtime PRN Insomnia      Allergies    penicillin (Short breath; Rash)  statins (Vomiting)  erythromycin (Rash)  Synjardy (Other (Severe))  nystatin topical (Hives; Rash)  vancomycin (Short breath; Rash)    Intolerances        REVIEW OF SYSTEMS:  CONSTITUTIONAL: No fever or chills  HEENT: + post nasal drip. No headache, no sore throat  RESPIRATORY: No cough, wheezing, or shortness of breath  CARDIOVASCULAR: No chest pain, palpitations  GASTROINTESTINAL: No abd pain, nausea, vomiting, or diarrhea  GENITOURINARY: No dysuria, frequency, or hematuria  NEUROLOGICAL: no focal weakness or dizziness  MUSCULOSKELETAL: + neck pain    Objective:  Vital Signs Last 24 Hrs  T(C): 36.4 (01 May 2024 11:33), Max: 36.6 (30 Apr 2024 20:54)  T(F): 97.6 (01 May 2024 11:33), Max: 97.9 (30 Apr 2024 20:54)  HR: 62 (01 May 2024 11:33) (59 - 62)  BP: 179/81 (01 May 2024 11:33) (148/75 - 179/81)  BP(mean): --  RR: 18 (01 May 2024 11:33) (18 - 19)  SpO2: 92% (01 May 2024 11:33) (92% - 92%)    Parameters below as of 01 May 2024 11:33  Patient On (Oxygen Delivery Method): room air        GENERAL: NAD, appears comfortable  HEENT:  anicteric, moist mucous membranes; poor dentition  CHEST/LUNG:  decreased breath sounds b/l  HEART:  RRR, S1, S2  ABDOMEN:  BS+, soft, nontender, nondistended  EXTREMITIES: 2+ pitting LE edema bilaterally, legs wrapped in bandage  NERVOUS SYSTEM: answers questions and follows commands appropriately    LABS:                        12.1   3.73  )-----------( 145      ( 01 May 2024 06:18 )             39.5     CBC Full  -  ( 01 May 2024 06:18 )  WBC Count : 3.73 K/uL  Hemoglobin : 12.1 g/dL  Hematocrit : 39.5 %  Platelet Count - Automated : 145 K/uL  Mean Cell Volume : 91.9 fl  Mean Cell Hemoglobin : 28.1 pg  Mean Cell Hemoglobin Concentration : 30.6 gm/dL  Auto Neutrophil # : x  Auto Lymphocyte # : x  Auto Monocyte # : x  Auto Eosinophil # : x  Auto Basophil # : x  Auto Neutrophil % : x  Auto Lymphocyte % : x  Auto Monocyte % : x  Auto Eosinophil % : x  Auto Basophil % : x    01 May 2024 06:18    141    |  99     |  19     ----------------------------<  219    3.6     |  39     |  0.72     Ca    9.0        01 May 2024 06:18    TPro  6.8    /  Alb  2.9    /  TBili  0.9    /  DBili  x      /  AST  18     /  ALT  12     /  AlkPhos  97     01 May 2024 06:18      Urinalysis Basic - ( 01 May 2024 06:18 )    Color: x / Appearance: x / SG: x / pH: x  Gluc: 219 mg/dL / Ketone: x  / Bili: x / Urobili: x   Blood: x / Protein: x / Nitrite: x   Leuk Esterase: x / RBC: x / WBC x   Sq Epi: x / Non Sq Epi: x / Bacteria: x      CAPILLARY BLOOD GLUCOSE      POCT Blood Glucose.: 199 mg/dL (01 May 2024 11:54)  POCT Blood Glucose.: 218 mg/dL (01 May 2024 08:06)  POCT Blood Glucose.: 204 mg/dL (30 Apr 2024 21:08)  POCT Blood Glucose.: 265 mg/dL (30 Apr 2024 17:02)          RADIOLOGY & ADDITIONAL TESTS:    Personally reviewed.     Consultant(s) Notes Reviewed:  [x] YES  [ ] NO

## 2024-05-01 NOTE — PROGRESS NOTE ADULT - ATTENDING COMMENTS
patient seen at bedside  wants to use her humulin R 500 50-70 units BID at home   feels her pedal edema is getting worse   denies any chest pain   also requesting glucerna shake     + acewrap to bilateral lower extremities with edema +1 noted     A/P:  Acute on chronic HFrEF exacerbation   afib s/p ablation     - ECHO 1/2024: LVEF 45-50%     - refused repeat ECHO    - cont coreg     - discussed with cardio, lasix 40mg IV x 1 dose ordered    - CO2 was elevated with diuretic use. discussed with nephrology, given plan for lasix, will add diamox 250mg BID x 2 doses     - wound care evaluated for bilateral lower extremity wound     - cont dig    insulin dependent type 2    - endo NP consulted    - cont lantus 30 units a tonight with lispro 10 units TID     hypothyroidism    - cont synthroid     DVT proph: cont xarelto     case discussed with cardio, nephro and Endo NP
73 year old female with a past medical history of atrial fibrillation on Xarelto, bradycardia status post pacemaker, hypertension, diabetes, cardiomyopathy, pulmonary hypertension is presenting with lower extremity edema. Admit for acute decompensated congestive heart failure with mid-range ejection fraction. Continue IV diuresis. Check TTE. SW consult. D/w patient at bedside.
73 year old female with a past medical history of atrial fibrillation on Xarelto, bradycardia status post pacemaker, hypertension, diabetes, cardiomyopathy, pulmonary hypertension is presenting with lower extremity edema. Admitted for acute decompensated congestive heart failure with mid-range ejection fraction. s/p IV Lasix 4/29, on PO today. Dose of Diamox today. Wound care consult. Daily weight's strict i's and o's. Monitor and replete lytes.
73 year old female with a past medical history of atrial fibrillation on Xarelto, bradycardia status post pacemaker, hypertension, diabetes, cardiomyopathy, pulmonary hypertension is presenting with lower extremity edema. Admit for acute decompensated congestive heart failure with mid-range ejection fraction. Continue to monitor bicarb - improved today so will dose IV lasix 40mg x 1. Daily weight's strict i's and o's. Monitor and replete lytes. Consideration for diamox if bicarb worsens. Wound care consult. D/w patient re: plan. No plan for discharge at this time - remains volume overloaded in need of intravenous diuretics.
73 year old female with a past medical history of atrial fibrillation on Xarelto, bradycardia status post pacemaker, hypertension, diabetes, cardiomyopathy, pulmonary hypertension is presenting with lower extremity edema. Admit for acute decompensated congestive heart failure with mid-range ejection fraction. Improving. Continue IV lasix BID. Trialed metolazone today. Follow ECHO. Cardio following. D/w patient at bedside.
73 year old female with a past medical history of atrial fibrillation on Xarelto, bradycardia status post pacemaker, hypertension, diabetes, cardiomyopathy, pulmonary hypertension is presenting with lower extremity edema. Admit for acute decompensated congestive heart failure with mid-range ejection fraction. Developing contraction alkalosis. Hold PM lasix and reassess CMP tomorrow. May benefit from Diamox. Nephrology consult. Still volume overloaded and still needs IV diuretics. Do not anticipate DC - unclear why patient was under this impression (and upset by this as well) - will d/w CM. Concerns addressed.

## 2024-05-01 NOTE — CONSULT NOTE ADULT - ASSESSMENT
Physical Exam:   Vital Signs Last 24 Hrs  T(C): 36.4 (01 May 2024 11:33), Max: 36.6 (30 Apr 2024 20:54)  T(F): 97.6 (01 May 2024 11:33), Max: 97.9 (30 Apr 2024 20:54)  HR: 62 (01 May 2024 11:33) (59 - 62)  BP: 179/81 (01 May 2024 11:33) (148/75 - 179/81)  BP(mean): --  RR: 18 (01 May 2024 11:33) (18 - 19)  SpO2: 92% (01 May 2024 11:33) (92% - 92%)    Parameters below as of 01 May 2024 11:33  Patient On (Oxygen Delivery Method): room air             CAPILLARY BLOOD GLUCOSE      POCT Blood Glucose.: 199 mg/dL (01 May 2024 11:54)  POCT Blood Glucose.: 218 mg/dL (01 May 2024 08:06)  POCT Blood Glucose.: 204 mg/dL (30 Apr 2024 21:08)  POCT Blood Glucose.: 265 mg/dL (30 Apr 2024 17:02)      Cholesterol, Serum: 113 mg/dL (05.19.21 @ 08:36)     HDL Cholesterol, Serum: 22 mg/dL (05.19.21 @ 08:36)     LDL Cholesterol Calculated: 66 mg/dL (05.19.21 @ 08:36)     DIET: CC  >50%

## 2024-05-01 NOTE — PROGRESS NOTE ADULT - PROBLEM SELECTOR PLAN 4
Continue Synthroid

## 2024-05-01 NOTE — PROGRESS NOTE ADULT - ASSESSMENT
72 y/o Female w/ PMHx of HTN, Type 2 Diabetes, NICM/HFrEF, Afib/flutter s/p ablation 3/8/12, bradycardia/conduction disease s/p MDT dual-chamber PPM 3/8/12, pHTN, Hypothyroidism, Hyperparathyroidism presented to hospital c/o SOB; admitted for management of HFrEF exacerbation.  Outpatient Cards: Dr. Ellis    NICM/HFrEF exacerbation  - Presented w/ SOB, orthopnea, LE edema w/ 40lb weight gain  - TTE 1/2024: LVEF 40-45%, global LV hypokinesis, moderate AS/TR, severe pHTN  - TTE 2/3/24: LVEF 40%, dilated RA/LA, dilated RV/LV, LVH, PPM/ AICD in RA and RV, moderate segmental LV dysfxn w/ overall LVSF mildly reduced, trace AI, mod MR/TR, mild PI, severe pHTN  - Refused repeat ECHO  - BNP:  <--1034  - for additional lasix today   -alkalosis noted can start diamox 250mg po BID x 48 hours     - Continue GDMT: Coreg 12.5mg BID, Losartan 75 mg qd  - Daily weights, strict I&Os, fluid restriction    - EKG: SR w/ 1st degree AVB, RBBB, LVH; trops negative; no e/o ischemia   - No ischemic eval outpatient per records obtained from Dr. Ellis's office    - Known A fib, s/p ablation 3/8/12  - Rate controlled per flow sheets  - Continue Digoxin 250mcg, Coreg 12.5mg BID and Xarelto 20mg qhs  - Digoxin level 4/26: 1.5  - PPM interrogation 2/29/24: MVPR (AAIR > DDR ); normal device fxn, adequate pacing and sensing thresholds; estimated battery life 9.3yrs    - Monitor and replete lytes, keep K>4, Mg>2.  - Will continue to follow.

## 2024-05-01 NOTE — CONSULT NOTE ADULT - SUBJECTIVE AND OBJECTIVE BOX
Patient is a 73y old  Female who presents with a chief complaint of chf exacerbation (01 May 2024 14:58)    Type:2 DX >10  years.  known complications: cellulitis. Endocrine: dr Bolton- spoke with today via phone- re: starting U500 inpatient- recommended to resume with basal-bolus insulin and then transition home to Rx U500 50-70 units BID as per patient/dr Bolton.  Last seen- first time 2 weeks ago.  No recent Hx DKA/HHS, Glucometer checks- uses [employer] cell- 7 day hx TIR <50%., denies needs, diabetes education provided verbally and handouts.      HPI:  73 year old female with a past medical history of atrial fibrillation on Xarelto, bradycardia status post pacemaker, hypertension, diabetes, cardiomyopathy, pulmonary hypertension is presenting with lower extremity edema.  States that this similar presentation happened to her previously when she was admitted to the hospital.  She states her LE edema improved after discharge in January but then has been worsening for the past month. She endorse some shortness of breath and difficulty laying flat as well. with her symptoms.  Denies any chest pain.  States that she saw her endocrinologist and was told she had gained 35 pounds in water weight due to the swelling in her legs. She states her legs do feel uncomfortable States that ambulation has been more difficult for her recently due to her symptoms.   She states denies any other complaints. She follows with Cardio Dr. Yvan Ellis. Last TTE in 1/2024 showed EF 45-50%.     ED course:   Vitals: T 98.5 HR 66 /104   Labs: alk phosph 130 proBNP 1034   Given: 40 mg IV lasix  Imaging:   cxray : mild CHF at this time   LE US: no DVT  (25 Apr 2024 21:47)      PAST MEDICAL & SURGICAL HISTORY:  Hypothyroid      DM (diabetes mellitus)      HTN (hypertension)      Atrial fibrillation and flutter  s/p ablation      Bradycardia  s/p MDT PPM      H/O pulmonary hypertension      H/O hyperparathyroidism      H/O cardiomyopathy      Cardiac pacemaker      H/O cardiac radiofrequency ablation      H/O carpal tunnel repair      History of parathyroid surgery          Allergies    penicillin (Short breath; Rash)  statins (Vomiting)  erythromycin (Rash)  Synjardy (Other (Severe))  nystatin topical (Hives; Rash)  vancomycin (Short breath; Rash)    Intolerances        MEDICATIONS  (STANDING):  acetaZOLAMIDE    Tablet 250 milliGRAM(s) Oral every 12 hours  artificial tears (preservative free) Ophthalmic Solution 1 Drop(s) Both EYES every 6 hours  carvedilol 12.5 milliGRAM(s) Oral every 12 hours  clotrimazole/betamethasone Cream 1 Application(s) Topical daily  dextrose 10% Bolus 125 milliLiter(s) IV Bolus once  dextrose 5%. 1000 milliLiter(s) (50 mL/Hr) IV Continuous <Continuous>  dextrose 5%. 1000 milliLiter(s) (100 mL/Hr) IV Continuous <Continuous>  dextrose 50% Injectable 25 Gram(s) IV Push once  dextrose 50% Injectable 12.5 Gram(s) IV Push once  digoxin     Tablet 250 MICROGram(s) Oral daily  glucagon  Injectable 1 milliGRAM(s) IntraMuscular once  insulin glargine Injectable (LANTUS) 30 Unit(s) SubCutaneous at bedtime  insulin lispro (ADMELOG) corrective regimen sliding scale   SubCutaneous Before meals and at bedtime  insulin lispro Injectable (ADMELOG) 10 Unit(s) SubCutaneous three times a day before meals  levothyroxine 125 MICROGram(s) Oral daily  lidocaine   4% Patch 1 Patch Transdermal daily  loratadine 10 milliGRAM(s) Oral daily  losartan 75 milliGRAM(s) Oral daily  rivaroxaban 20 milliGRAM(s) Oral with dinner

## 2024-05-01 NOTE — CONSULT NOTE ADULT - PROBLEM SELECTOR RECOMMENDATION 9
Xeroform, dry dressing, ACE wrap.
Type 2 A1c 8.3% adm HF  Lantus 30 units HS; lispro 10 units AC x3; low scale  FU appt: dr Bolton  DSC recommendations: return to home U 500 50-70 units BID as directed regimen and CGM/glucose monitoring  diabetes education provided  Diabetes support info and cell # [phone] given   Goal 100-180 mg/dL; 140-180 mg/dL in critical care areas

## 2024-05-01 NOTE — CONSULT NOTE ADULT - CONSULT REASON
Alkalosis
HF exac
Left leg ulcer
73y A1C with Estimated Average Glucose Result: 8.3 % (04-26-24 @ 07:55)   diabetes mellitus uncontrolled type 2

## 2024-05-01 NOTE — PROGRESS NOTE ADULT - PROBLEM SELECTOR PLAN 5
Continue Zolpidem

## 2024-05-01 NOTE — PROGRESS NOTE ADULT - SUBJECTIVE AND OBJECTIVE BOX
Patient is a 73y old  Female who presents with a chief complaint of chf exacerbation (2024 10:52)       HPI:  73 year old female with a past medical history of atrial fibrillation on Xarelto, bradycardia status post pacemaker, hypertension, diabetes, cardiomyopathy, pulmonary hypertension is presenting with lower extremity edema.  States that this similar presentation happened to her previously when she was admitted to the hospital.  She states her LE edema improved after discharge in January but then has been worsening for the past month. She endorse some shortness of breath and difficulty laying flat as well. with her symptoms.  Denies any chest pain.  States that she saw her endocrinologist and was told she had gained 35 pounds in water weight due to the swelling in her legs. She states her legs do feel uncomfortable States that ambulation has been more difficult for her recently due to her symptoms.   She states denies any other complaints. She follows with Cardio Dr. Yvan Ellis. Last TTE in 2024 showed EF 45-50%.   Urinating without issue.  No N/V.  Has not seen nephrologist in the past.        PAST MEDICAL & SURGICAL HISTORY:  Hypothyroid      DM (diabetes mellitus)      HTN (hypertension)      Atrial fibrillation and flutter  s/p ablation      Bradycardia  s/p MDT PPM      H/O pulmonary hypertension      H/O hyperparathyroidism      H/O cardiomyopathy      Cardiac pacemaker      H/O cardiac radiofrequency ablation      H/O carpal tunnel repair      History of parathyroid surgery           FAMILY HISTORY:  No pertinent family history in first degree relatives    [place]    Social History:Non smoker    MEDICATIONS  (STANDING):  artificial tears (preservative free) Ophthalmic Solution 1 Drop(s) Both EYES every 6 hours  carvedilol 12.5 milliGRAM(s) Oral every 12 hours  clotrimazole/betamethasone Cream 1 Application(s) Topical daily  dextrose 10% Bolus 125 milliLiter(s) IV Bolus once  dextrose 5%. 1000 milliLiter(s) (50 mL/Hr) IV Continuous <Continuous>  dextrose 5%. 1000 milliLiter(s) (100 mL/Hr) IV Continuous <Continuous>  dextrose 50% Injectable 25 Gram(s) IV Push once  dextrose 50% Injectable 12.5 Gram(s) IV Push once  digoxin     Tablet 250 MICROGram(s) Oral daily  furosemide    Tablet 40 milliGRAM(s) Oral daily  glucagon  Injectable 1 milliGRAM(s) IntraMuscular once  insulin glargine Injectable (LANTUS) 23 Unit(s) SubCutaneous at bedtime  insulin lispro (ADMELOG) corrective regimen sliding scale   SubCutaneous at bedtime  insulin lispro (ADMELOG) corrective regimen sliding scale   SubCutaneous three times a day before meals  insulin lispro Injectable (ADMELOG) 7 Unit(s) SubCutaneous three times a day before meals  levothyroxine 125 MICROGram(s) Oral daily  lidocaine 4% Cream 1 Application(s) Topical daily  losartan 75 milliGRAM(s) Oral daily  rivaroxaban 20 milliGRAM(s) Oral with dinner    MEDICATIONS  (PRN):  acetaminophen     Tablet .. 650 milliGRAM(s) Oral every 6 hours PRN Temp greater or equal to 38C (100.4F), Mild Pain (1 - 3)  ALPRAZolam 0.25 milliGRAM(s) Oral daily PRN anxiety  aluminum hydroxide/magnesium hydroxide/simethicone Suspension 30 milliLiter(s) Oral every 4 hours PRN Dyspepsia  dextrose Oral Gel 15 Gram(s) Oral once PRN Blood Glucose LESS THAN 70 milliGRAM(s)/deciliter  melatonin 3 milliGRAM(s) Oral at bedtime PRN Insomnia  ondansetron Injectable 4 milliGRAM(s) IV Push every 8 hours PRN Nausea and/or Vomiting  zolpidem 5 milliGRAM(s) Oral at bedtime PRN Insomnia      Allergies    penicillin (Short breath; Rash)  statins (Vomiting)  erythromycin (Rash)  Synjardy (Other (Severe))  nystatin topical (Hives; Rash)  vancomycin (Short breath; Rash)    Intolerances         REVIEW OF SYSTEMS:    Constitutional: Denies fatigue  HEENT: Denies headaches and dizziness  Respiratory: denies SOB, cough, or wheezing  Cardiovascular: denies CP, palpitations  Gastrointestinal: Denies nausea, denies vomiting, diarrhea, constipation, abdominal pain, or bloody stools  Genitourinary: denies painful urination, increased frequency, urgency, or bloody urine  Skin: denies rashes or itching  Musculoskeletal: denies muscle aches, joint swelling  Neurologic: Denies generalized weakness, denies loss of sensation, numbness, or tingling      Vital Signs Last 24 Hrs  T(C): 36.4 (01 May 2024 11:33), Max: 36.6 (2024 20:54)  T(F): 97.6 (01 May 2024 11:33), Max: 97.9 (2024 20:54)  HR: 62 (01 May 2024 11:33) (59 - 62)  BP: 179/81 (01 May 2024 11:33) (148/75 - 179/81)  BP(mean): --  RR: 18 (01 May 2024 11:33) (18 - 19)  SpO2: 92% (01 May 2024 11:33) (92% - 92%)    Parameters below as of 01 May 2024 11:33  Patient On (Oxygen Delivery Method): room air        Daily     Daily Weight in k.5 (2024 04:40)    PHYSICAL EXAM:    GENERAL: NAD  HEAD:  Atraumatic, Normocephalic  EYES: EOMI, conjunctiva and sclera clear  ENMT: No Drainage from nares, No drainage from ears  NERVOUS SYSTEM:  Awake and Alert  CHEST/LUNG: Clear to percussion bilaterally  EXTREMITIES:  No Edema        LABS:                                          12.1   3.73  )-----------( 145      ( 01 May 2024 06:18 )             39.5     05-01    141  |  99  |  19  ----------------------------<  219<H>  3.6   |  39<H>  |  0.72    Ca    9.0      01 May 2024 06:18    TPro  6.8  /  Alb  2.9<L>  /  TBili  0.9  /  DBili  x   /  AST  18  /  ALT  12  /  AlkPhos  97  05-01      Urinalysis Basic - ( 01 May 2024 06:18 )    Color: x / Appearance: x / SG: x / pH: x  Gluc: 219 mg/dL / Ketone: x  / Bili: x / Urobili: x   Blood: x / Protein: x / Nitrite: x   Leuk Esterase: x / RBC: x / WBC x   Sq Epi: x / Non Sq Epi: x / Bacteria: x

## 2024-05-01 NOTE — PROGRESS NOTE ADULT - PROBLEM SELECTOR PLAN 7
Continue Xarelto 20 mg daily

## 2024-05-01 NOTE — PROGRESS NOTE ADULT - PROBLEM SELECTOR PLAN 1
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)  - TTE 1/2024 LVEF 45-50%  - now with LE pitting edema, sob and orthopnea  - f/u TTE - pending  - continue home Coreg  - s/p Lasix 40mg IVP BID - given metolazone x 1 on 4/27 as a trial (with good effect)  - Increase losartan to 75mg QD for GDMT - can uptitrate - was on 75mg daily on DC but was told by follow up that this did not to be refilled - watch renal indices closely  - developed contraction alkalosis on loop diuretic,  d/c standing lasix  - will give lasix 40 mg IV x1  - give diamox 250mg x2 today for contraction alkalosis  - reassess use of diuretic daily  - pt w/ werajiv LE wounds -- wound care RN consulted, Dr. Dumont currently away  - Cardio following, recs appreciated

## 2024-05-01 NOTE — PROGRESS NOTE ADULT - PROBLEM SELECTOR PLAN 3
Recommended cont humulin R U-500 45 units bid by Endo last admission   Increase Lantus 30 units at bedtime with Lispro 10 units premeal with MDISS  - Diabetes educator consulted, Pat Weil, FU recs
Recommended cont humulin R U-500 45 units bid by Endo last admission   Will continue conversion Lantus 23 units at bedtime with Lispro 7 units premeal with MDISS (discussed with pharmacy)

## 2024-05-02 ENCOUNTER — TRANSCRIPTION ENCOUNTER (OUTPATIENT)
Age: 74
End: 2024-05-02

## 2024-05-02 VITALS
OXYGEN SATURATION: 92 % | HEART RATE: 67 BPM | RESPIRATION RATE: 19 BRPM | DIASTOLIC BLOOD PRESSURE: 71 MMHG | SYSTOLIC BLOOD PRESSURE: 178 MMHG | TEMPERATURE: 98 F

## 2024-05-02 LAB
ALBUMIN SERPL ELPH-MCNC: 2.9 G/DL — LOW (ref 3.3–5)
ALP SERPL-CCNC: 100 U/L — SIGNIFICANT CHANGE UP (ref 40–120)
ALT FLD-CCNC: 13 U/L — SIGNIFICANT CHANGE UP (ref 12–78)
ANION GAP SERPL CALC-SCNC: 6 MMOL/L — SIGNIFICANT CHANGE UP (ref 5–17)
AST SERPL-CCNC: 23 U/L — SIGNIFICANT CHANGE UP (ref 15–37)
BASOPHILS # BLD AUTO: 0.05 K/UL — SIGNIFICANT CHANGE UP (ref 0–0.2)
BASOPHILS NFR BLD AUTO: 1.3 % — SIGNIFICANT CHANGE UP (ref 0–2)
BILIRUB SERPL-MCNC: 0.9 MG/DL — SIGNIFICANT CHANGE UP (ref 0.2–1.2)
BUN SERPL-MCNC: 19 MG/DL — SIGNIFICANT CHANGE UP (ref 7–23)
CALCIUM SERPL-MCNC: 8.5 MG/DL — SIGNIFICANT CHANGE UP (ref 8.5–10.1)
CHLORIDE SERPL-SCNC: 101 MMOL/L — SIGNIFICANT CHANGE UP (ref 96–108)
CO2 SERPL-SCNC: 34 MMOL/L — HIGH (ref 22–31)
CREAT SERPL-MCNC: 0.69 MG/DL — SIGNIFICANT CHANGE UP (ref 0.5–1.3)
EGFR: 90 ML/MIN/1.73M2 — SIGNIFICANT CHANGE UP
EOSINOPHIL # BLD AUTO: 0.33 K/UL — SIGNIFICANT CHANGE UP (ref 0–0.5)
EOSINOPHIL NFR BLD AUTO: 8.9 % — HIGH (ref 0–6)
GLUCOSE SERPL-MCNC: 206 MG/DL — HIGH (ref 70–99)
HCT VFR BLD CALC: 41.3 % — SIGNIFICANT CHANGE UP (ref 34.5–45)
HGB BLD-MCNC: 12.7 G/DL — SIGNIFICANT CHANGE UP (ref 11.5–15.5)
IMM GRANULOCYTES NFR BLD AUTO: 0.3 % — SIGNIFICANT CHANGE UP (ref 0–0.9)
LYMPHOCYTES # BLD AUTO: 1.28 K/UL — SIGNIFICANT CHANGE UP (ref 1–3.3)
LYMPHOCYTES # BLD AUTO: 34.4 % — SIGNIFICANT CHANGE UP (ref 13–44)
MCHC RBC-ENTMCNC: 28.1 PG — SIGNIFICANT CHANGE UP (ref 27–34)
MCHC RBC-ENTMCNC: 30.8 GM/DL — LOW (ref 32–36)
MCV RBC AUTO: 91.4 FL — SIGNIFICANT CHANGE UP (ref 80–100)
MONOCYTES # BLD AUTO: 0.35 K/UL — SIGNIFICANT CHANGE UP (ref 0–0.9)
MONOCYTES NFR BLD AUTO: 9.4 % — SIGNIFICANT CHANGE UP (ref 2–14)
NEUTROPHILS # BLD AUTO: 1.7 K/UL — LOW (ref 1.8–7.4)
NEUTROPHILS NFR BLD AUTO: 45.7 % — SIGNIFICANT CHANGE UP (ref 43–77)
NRBC # BLD: 0 /100 WBCS — SIGNIFICANT CHANGE UP (ref 0–0)
PLATELET # BLD AUTO: 149 K/UL — LOW (ref 150–400)
POTASSIUM SERPL-MCNC: 3.8 MMOL/L — SIGNIFICANT CHANGE UP (ref 3.5–5.3)
POTASSIUM SERPL-SCNC: 3.8 MMOL/L — SIGNIFICANT CHANGE UP (ref 3.5–5.3)
PROT SERPL-MCNC: 6.9 G/DL — SIGNIFICANT CHANGE UP (ref 6–8.3)
RBC # BLD: 4.52 M/UL — SIGNIFICANT CHANGE UP (ref 3.8–5.2)
RBC # FLD: 14.7 % — HIGH (ref 10.3–14.5)
SODIUM SERPL-SCNC: 141 MMOL/L — SIGNIFICANT CHANGE UP (ref 135–145)
WBC # BLD: 3.72 K/UL — LOW (ref 3.8–10.5)
WBC # FLD AUTO: 3.72 K/UL — LOW (ref 3.8–10.5)

## 2024-05-02 PROCEDURE — 97116 GAIT TRAINING THERAPY: CPT

## 2024-05-02 PROCEDURE — 99285 EMERGENCY DEPT VISIT HI MDM: CPT | Mod: 25

## 2024-05-02 PROCEDURE — 71045 X-RAY EXAM CHEST 1 VIEW: CPT

## 2024-05-02 PROCEDURE — 83036 HEMOGLOBIN GLYCOSYLATED A1C: CPT

## 2024-05-02 PROCEDURE — 99232 SBSQ HOSP IP/OBS MODERATE 35: CPT

## 2024-05-02 PROCEDURE — 83735 ASSAY OF MAGNESIUM: CPT

## 2024-05-02 PROCEDURE — 80162 ASSAY OF DIGOXIN TOTAL: CPT

## 2024-05-02 PROCEDURE — 84100 ASSAY OF PHOSPHORUS: CPT

## 2024-05-02 PROCEDURE — 85027 COMPLETE CBC AUTOMATED: CPT

## 2024-05-02 PROCEDURE — 97162 PT EVAL MOD COMPLEX 30 MIN: CPT

## 2024-05-02 PROCEDURE — 80048 BASIC METABOLIC PNL TOTAL CA: CPT

## 2024-05-02 PROCEDURE — 85730 THROMBOPLASTIN TIME PARTIAL: CPT

## 2024-05-02 PROCEDURE — 83880 ASSAY OF NATRIURETIC PEPTIDE: CPT

## 2024-05-02 PROCEDURE — 93005 ELECTROCARDIOGRAM TRACING: CPT

## 2024-05-02 PROCEDURE — 84484 ASSAY OF TROPONIN QUANT: CPT

## 2024-05-02 PROCEDURE — 36415 COLL VENOUS BLD VENIPUNCTURE: CPT

## 2024-05-02 PROCEDURE — 99239 HOSP IP/OBS DSCHRG MGMT >30: CPT

## 2024-05-02 PROCEDURE — 80053 COMPREHEN METABOLIC PANEL: CPT

## 2024-05-02 PROCEDURE — 85610 PROTHROMBIN TIME: CPT

## 2024-05-02 PROCEDURE — 85025 COMPLETE CBC W/AUTO DIFF WBC: CPT

## 2024-05-02 PROCEDURE — 97530 THERAPEUTIC ACTIVITIES: CPT

## 2024-05-02 PROCEDURE — 82962 GLUCOSE BLOOD TEST: CPT

## 2024-05-02 RX ORDER — LEVOTHYROXINE SODIUM 125 MCG
1 TABLET ORAL
Qty: 0 | Refills: 0 | DISCHARGE
Start: 2024-05-02

## 2024-05-02 RX ORDER — LOSARTAN POTASSIUM 100 MG/1
100 TABLET, FILM COATED ORAL DAILY
Refills: 0 | Status: DISCONTINUED | OUTPATIENT
Start: 2024-05-03 | End: 2024-05-02

## 2024-05-02 RX ORDER — LORATADINE 10 MG/1
1 TABLET ORAL
Qty: 0 | Refills: 0 | DISCHARGE
Start: 2024-05-02

## 2024-05-02 RX ORDER — LEVOTHYROXINE SODIUM 125 MCG
1 TABLET ORAL
Qty: 30 | Refills: 0
Start: 2024-05-02 | End: 2024-05-31

## 2024-05-02 RX ORDER — LOSARTAN POTASSIUM 100 MG/1
1 TABLET, FILM COATED ORAL
Qty: 30 | Refills: 0
Start: 2024-05-02 | End: 2024-05-31

## 2024-05-02 RX ORDER — FUROSEMIDE 40 MG
1 TABLET ORAL
Qty: 30 | Refills: 0
Start: 2024-05-02 | End: 2024-05-31

## 2024-05-02 RX ORDER — ALPRAZOLAM 0.25 MG
1 TABLET ORAL
Qty: 5 | Refills: 0
Start: 2024-05-02

## 2024-05-02 RX ORDER — LOSARTAN POTASSIUM 100 MG/1
25 TABLET, FILM COATED ORAL ONCE
Refills: 0 | Status: COMPLETED | OUTPATIENT
Start: 2024-05-02 | End: 2024-05-02

## 2024-05-02 RX ORDER — DIGOXIN 250 MCG
1 TABLET ORAL
Qty: 30 | Refills: 0
Start: 2024-05-02 | End: 2024-05-31

## 2024-05-02 RX ADMIN — LIDOCAINE 1 PATCH: 4 CREAM TOPICAL at 11:21

## 2024-05-02 RX ADMIN — Medication 650 MILLIGRAM(S): at 08:03

## 2024-05-02 RX ADMIN — LIDOCAINE 1 PATCH: 4 CREAM TOPICAL at 00:59

## 2024-05-02 RX ADMIN — LIDOCAINE 1 APPLICATION(S): 4 CREAM TOPICAL at 10:43

## 2024-05-02 RX ADMIN — CARVEDILOL PHOSPHATE 12.5 MILLIGRAM(S): 80 CAPSULE, EXTENDED RELEASE ORAL at 05:27

## 2024-05-02 RX ADMIN — Medication 250 MICROGRAM(S): at 05:26

## 2024-05-02 RX ADMIN — Medication 1: at 08:01

## 2024-05-02 RX ADMIN — Medication 10 UNIT(S): at 08:01

## 2024-05-02 RX ADMIN — CLOTRIMAZOLE AND BETAMETHASONE DIPROPIONATE 1 APPLICATION(S): 10; .5 CREAM TOPICAL at 11:22

## 2024-05-02 RX ADMIN — LORATADINE 10 MILLIGRAM(S): 10 TABLET ORAL at 11:21

## 2024-05-02 RX ADMIN — LOSARTAN POTASSIUM 25 MILLIGRAM(S): 100 TABLET, FILM COATED ORAL at 08:12

## 2024-05-02 RX ADMIN — Medication 125 MICROGRAM(S): at 05:26

## 2024-05-02 RX ADMIN — LOSARTAN POTASSIUM 75 MILLIGRAM(S): 100 TABLET, FILM COATED ORAL at 05:26

## 2024-05-02 NOTE — PROGRESS NOTE ADULT - PROVIDER SPECIALTY LIST ADULT
Cardiology
Nephrology
Hospitalist

## 2024-05-02 NOTE — DISCHARGE NOTE NURSING/CASE MANAGEMENT/SOCIAL WORK - NSDCPEFALRISK_GEN_ALL_CORE
For information on Fall & Injury Prevention, visit: [email or web address] OR  [email or web address] OR  https://www.cdc.gov/steadi/patient.html

## 2024-05-02 NOTE — CASE MANAGEMENT PROGRESS NOTE - NSCMPROGRESSNOTE_GEN_ALL_CORE
Patient medically cleared for DC. Referral sent to [facility] and accepted. Friend to transport . DC needs in place

## 2024-05-02 NOTE — DISCHARGE NOTE NURSING/CASE MANAGEMENT/SOCIAL WORK - PATIENT PORTAL LINK FT
You can access the FollowMyHealth Patient Portal offered by [facility] by registering at the following website: [email or web address]. By joining [employer]’s FollowMyHealth portal, you will also be able to view your health information using other applications (apps) compatible with our system.

## 2024-05-02 NOTE — PROGRESS NOTE ADULT - ASSESSMENT
Metabolic Alkalosis  CKD 2  Edema  HTN  CHF    -Metabolic Alkalosis likely from diuretic and contraction  -Started on losartan  -Alkalosis improving s/p diamox;   -Agree with lasix   -Cardiology note reviewed  -Renal indices are stable    d/w primary, no renal objection to DC

## 2024-05-02 NOTE — PROGRESS NOTE ADULT - REASON FOR ADMISSION
chf exacerbation

## 2024-05-02 NOTE — PROGRESS NOTE ADULT - SUBJECTIVE AND OBJECTIVE BOX
Patient is a 73y old  Female who presents with a chief complaint of chf exacerbation (2024 10:52)       HPI:  73 year old female with a past medical history of atrial fibrillation on Xarelto, bradycardia status post pacemaker, hypertension, diabetes, cardiomyopathy, pulmonary hypertension is presenting with lower extremity edema.  States that this similar presentation happened to her previously when she was admitted to the hospital.  She states her LE edema improved after discharge in January but then has been worsening for the past month. She endorse some shortness of breath and difficulty laying flat as well. with her symptoms.  Denies any chest pain.  States that she saw her endocrinologist and was told she had gained 35 pounds in water weight due to the swelling in her legs. She states her legs do feel uncomfortable States that ambulation has been more difficult for her recently due to her symptoms.   She states denies any other complaints. She follows with Cardio Dr. Yvan Ellis. Last TTE in 2024 showed EF 45-50%.   Urinating without issue.  No N/V.  Has not seen nephrologist in the past.        PAST MEDICAL & SURGICAL HISTORY:  Hypothyroid      DM (diabetes mellitus)      HTN (hypertension)      Atrial fibrillation and flutter  s/p ablation      Bradycardia  s/p MDT PPM      H/O pulmonary hypertension      H/O hyperparathyroidism      H/O cardiomyopathy      Cardiac pacemaker      H/O cardiac radiofrequency ablation      H/O carpal tunnel repair      History of parathyroid surgery           FAMILY HISTORY:  No pertinent family history in first degree relatives    [place]    Social History:Non smoker    MEDICATIONS  (STANDING):  artificial tears (preservative free) Ophthalmic Solution 1 Drop(s) Both EYES every 6 hours  carvedilol 12.5 milliGRAM(s) Oral every 12 hours  clotrimazole/betamethasone Cream 1 Application(s) Topical daily  dextrose 10% Bolus 125 milliLiter(s) IV Bolus once  dextrose 5%. 1000 milliLiter(s) (50 mL/Hr) IV Continuous <Continuous>  dextrose 5%. 1000 milliLiter(s) (100 mL/Hr) IV Continuous <Continuous>  dextrose 50% Injectable 25 Gram(s) IV Push once  dextrose 50% Injectable 12.5 Gram(s) IV Push once  digoxin     Tablet 250 MICROGram(s) Oral daily  furosemide    Tablet 40 milliGRAM(s) Oral daily  glucagon  Injectable 1 milliGRAM(s) IntraMuscular once  insulin glargine Injectable (LANTUS) 23 Unit(s) SubCutaneous at bedtime  insulin lispro (ADMELOG) corrective regimen sliding scale   SubCutaneous at bedtime  insulin lispro (ADMELOG) corrective regimen sliding scale   SubCutaneous three times a day before meals  insulin lispro Injectable (ADMELOG) 7 Unit(s) SubCutaneous three times a day before meals  levothyroxine 125 MICROGram(s) Oral daily  lidocaine 4% Cream 1 Application(s) Topical daily  losartan 75 milliGRAM(s) Oral daily  rivaroxaban 20 milliGRAM(s) Oral with dinner    MEDICATIONS  (PRN):  acetaminophen     Tablet .. 650 milliGRAM(s) Oral every 6 hours PRN Temp greater or equal to 38C (100.4F), Mild Pain (1 - 3)  ALPRAZolam 0.25 milliGRAM(s) Oral daily PRN anxiety  aluminum hydroxide/magnesium hydroxide/simethicone Suspension 30 milliLiter(s) Oral every 4 hours PRN Dyspepsia  dextrose Oral Gel 15 Gram(s) Oral once PRN Blood Glucose LESS THAN 70 milliGRAM(s)/deciliter  melatonin 3 milliGRAM(s) Oral at bedtime PRN Insomnia  ondansetron Injectable 4 milliGRAM(s) IV Push every 8 hours PRN Nausea and/or Vomiting  zolpidem 5 milliGRAM(s) Oral at bedtime PRN Insomnia      Allergies    penicillin (Short breath; Rash)  statins (Vomiting)  erythromycin (Rash)  Synjardy (Other (Severe))  nystatin topical (Hives; Rash)  vancomycin (Short breath; Rash)    Intolerances         REVIEW OF SYSTEMS:    Constitutional: Denies fatigue  HEENT: Denies headaches and dizziness  Respiratory: denies SOB, cough, or wheezing  Cardiovascular: denies CP, palpitations  Gastrointestinal: Denies nausea, denies vomiting, diarrhea, constipation, abdominal pain, or bloody stools  Genitourinary: denies painful urination, increased frequency, urgency, or bloody urine  Skin: denies rashes or itching  Musculoskeletal: denies muscle aches, joint swelling  Neurologic: Denies generalized weakness, denies loss of sensation, numbness, or tingling      ICU Vital Signs Last 24 Hrs  T(C): 36.8 (02 May 2024 11:20), Max: 36.8 (01 May 2024 16:59)  T(F): 98.2 (02 May 2024 11:20), Max: 98.3 (01 May 2024 16:59)  HR: 67 (02 May 2024 11:20) (60 - 67)  BP: 178/71 (02 May 2024 11:20) (165/76 - 178/71)  BP(mean): --  ABP: --  ABP(mean): --  RR: 19 (02 May 2024 11:20) (18 - 19)  SpO2: 92% (02 May 2024 11:20) (90% - 93%)    O2 Parameters below as of 02 May 2024 11:20  Patient On (Oxygen Delivery Method): room air                Daily     Daily Weight in k.5 (2024 04:40)    PHYSICAL EXAM:    GENERAL: NAD  HEAD:  Atraumatic, Normocephalic  EYES: EOMI, conjunctiva and sclera clear  ENMT: No Drainage from nares, No drainage from ears  NERVOUS SYSTEM:  Awake and Alert  CHEST/LUNG: Clear to percussion bilaterally  EXTREMITIES:  No Edema        LABS:                                                        12.7   3.72  )-----------( 149      ( 02 May 2024 06:10 )             41.3     05-02    141  |  101  |  19  ----------------------------<  206<H>  3.8   |  34<H>  |  0.69    Ca    8.5      02 May 2024 06:10    TPro  6.9  /  Alb  2.9<L>  /  TBili  0.9  /  DBili  x   /  AST  23  /  ALT  13  /  AlkPhos  100  05-02      Urinalysis Basic - ( 02 May 2024 06:10 )    Color: x / Appearance: x / SG: x / pH: x  Gluc: 206 mg/dL / Ketone: x  / Bili: x / Urobili: x   Blood: x / Protein: x / Nitrite: x   Leuk Esterase: x / RBC: x / WBC x   Sq Epi: x / Non Sq Epi: x / Bacteria: x

## 2024-05-02 NOTE — PROGRESS NOTE ADULT - SUBJECTIVE AND OBJECTIVE BOX
[facility] -- Josue Moore, Kenneth Bee Savella, , Noelle Fry  Office # [phone]    Follow Up:  Afib/flutter s/p PPM, NICMY, SOB, Edema, Bifascicular block    Subjective/Observations: Tolerating RA.  States, she feels much better.  Eager to go home.  Denies any form of respiratory or cardiac discomfort.  Apacing on tele    REVIEW OF SYSTEMS: All other review of systems is negative unless indicated above  PAST MEDICAL & SURGICAL HISTORY:  Hypothyroid      DM (diabetes mellitus)      HTN (hypertension)      Atrial fibrillation and flutter  s/p ablation      Bradycardia  s/p MDT PPM      H/O pulmonary hypertension      H/O hyperparathyroidism      H/O cardiomyopathy      Cardiac pacemaker      H/O cardiac radiofrequency ablation      H/O carpal tunnel repair      History of parathyroid surgery  MEDICATIONS  (STANDING):  artificial tears (preservative free) Ophthalmic Solution 1 Drop(s) Both EYES every 6 hours  carvedilol 12.5 milliGRAM(s) Oral every 12 hours  clotrimazole/betamethasone Cream 1 Application(s) Topical daily  dextrose 10% Bolus 125 milliLiter(s) IV Bolus once  dextrose 5%. 1000 milliLiter(s) (50 mL/Hr) IV Continuous <Continuous>  dextrose 5%. 1000 milliLiter(s) (100 mL/Hr) IV Continuous <Continuous>  dextrose 50% Injectable 25 Gram(s) IV Push once  dextrose 50% Injectable 12.5 Gram(s) IV Push once  digoxin     Tablet 250 MICROGram(s) Oral daily  glucagon  Injectable 1 milliGRAM(s) IntraMuscular once  insulin glargine Injectable (LANTUS) 30 Unit(s) SubCutaneous at bedtime  insulin lispro (ADMELOG) corrective regimen sliding scale   SubCutaneous Before meals and at bedtime  insulin lispro Injectable (ADMELOG) 10 Unit(s) SubCutaneous three times a day before meals  levothyroxine 125 MICROGram(s) Oral daily  lidocaine   4% Patch 1 Patch Transdermal daily  loratadine 10 milliGRAM(s) Oral daily  rivaroxaban 20 milliGRAM(s) Oral with dinner    MEDICATIONS  (PRN):  acetaminophen     Tablet .. 650 milliGRAM(s) Oral every 6 hours PRN Temp greater or equal to 38C (100.4F), Mild Pain (1 - 3)  ALPRAZolam 0.25 milliGRAM(s) Oral daily PRN anxiety  aluminum hydroxide/magnesium hydroxide/simethicone Suspension 30 milliLiter(s) Oral every 4 hours PRN Dyspepsia  dextrose Oral Gel 15 Gram(s) Oral once PRN Blood Glucose LESS THAN 70 milliGRAM(s)/deciliter  lidocaine 4% Cream 1 Application(s) Topical two times a day PRN pain in feet  melatonin 3 milliGRAM(s) Oral at bedtime PRN Insomnia  ondansetron Injectable 4 milliGRAM(s) IV Push every 8 hours PRN Nausea and/or Vomiting  zolpidem 5 milliGRAM(s) Oral at bedtime PRN Insomnia    Allergies    penicillin (Short breath; Rash)  statins (Vomiting)  erythromycin (Rash)  Synjardy (Other (Severe))  nystatin topical (Hives; Rash)  vancomycin (Short breath; Rash)    Intolerances    Vital Signs Last 24 Hrs  T(C): 36.5 (02 May 2024 05:02), Max: 36.8 (01 May 2024 16:59)  T(F): 97.7 (02 May 2024 05:02), Max: 98.3 (01 May 2024 16:59)  HR: 60 (02 May 2024 05:02) (60 - 62)  BP: 165/76 (02 May 2024 05:02) (165/76 - 179/81)  BP(mean): --  RR: 19 (02 May 2024 05:02) (18 - 19)  SpO2: 93% (02 May 2024 05:02) (90% - 93%)    Parameters below as of 02 May 2024 05:02  Patient On (Oxygen Delivery Method): room air    I&O's Summary    01 May 2024 07:01  -  02 May 2024 07:00  --------------------------------------------------------  IN: 200 mL / OUT: 2200 mL / NET: -2000 mL     PHYSICAL EXAM:  TELE: Apacing  Constitutional: NAD, awake and alert  HEENT: Moist Mucous Membranes, Anicteric  Pulmonary: Non-labored, breath sounds are clear bilaterally, No wheezing, rales or rhonchi  Cardiovascular: Regular/IRRR, S1 and S2, No murmurs, rubs, gallops or clicks  Gastrointestinal: Bowel Sounds present, soft, nontender.   Lymph: Trace BLE edema. No lymphadenopathy.  Skin: No visible rashes.  BLE ace wraps in use  Psych:  Mood & affect appropriate  LABS: All Labs Reviewed:                        12.7   3.72  )-----------( 149      ( 02 May 2024 06:10 )             41.3                         12.1   3.73  )-----------( 145      ( 01 May 2024 06:18 )             39.5                         12.4   3.87  )-----------( 145      ( 30 Apr 2024 07:50 )             39.4     02 May 2024 06:10    141    |  101    |  19     ----------------------------<  206    3.8     |  34     |  0.69   01 May 2024 06:18    141    |  99     |  19     ----------------------------<  219    3.6     |  39     |  0.72   30 Apr 2024 07:50    140    |  96     |  19     ----------------------------<  221    3.6     |  40     |  0.82     Ca    8.5        02 May 2024 06:10  Ca    9.0        01 May 2024 06:18  Ca    9.4        30 Apr 2024 07:50    TPro  6.9    /  Alb  2.9    /  TBili  0.9    /  DBili  x      /  AST  23     /  ALT  13     /  AlkPhos  100    02 May 2024 06:10  TPro  6.8    /  Alb  2.9    /  TBili  0.9    /  DBili  x      /  AST  18     /  ALT  12     /  AlkPhos  97     01 May 2024 06:18  TPro  6.9    /  Alb  3.0    /  TBili  0.9    /  DBili  x      /  AST  18     /  ALT  13     /  AlkPhos  101    30 Apr 2024 07:50    12 Lead ECG:   Ventricular Rate 61 BPM    Atrial Rate 61 BPM    P-R Interval 432 ms    QRS Duration 174 ms    Q-T Interval 450 ms    QTC Calculation(Bazett) 453 ms    R Axis -50 degrees    T Axis 126 degrees    Diagnosis Line Sinus rhythm with 1st degree AV block  atrial-paced complexes  Right bundle branch block  Left anterior fascicular block  *** Bifascicular block ***  Left ventricular hypertrophy with repolarization abnormality ( R in aVL )    Confirmed by USHA BEE ([id number]) on 4/26/2024 7:51:43 AM (04-25-24 @ 16:56)      TRANSTHORACIC ECHOCARDIOGRAM REPORT  ________________________________________________________________________________                                      _______       Pt. Name:       ROSITA GUTIERREZ Study Date:    1/22/2024  MRN:            [id number]          YOB: 1950  Accession #:    [id number]         Age:           73 years  Account#:       [id number]        Gender:        F  Heart Rate:                       Height:        ( )  Rhythm:                           Weight:   220.46 lb (100.00 kg)  Blood Pressure: 160/61 mmHg       BSA/BMI:       2.15 m² /  ________________________________________________________________________________________  Referring Physician:    [id number] Lexx Cuenca  Interpreting Physician: Serafin Cooper  Primary Sonographer:    Bernabe Heredia    CPT:               ECHO TTE WO CON COMP W DOPP - 55914.m  Indication(s):     Dyspnea, unspecified - R06.00  Procedure:         Transthoracic echocardiogram with 2-D, M-mode and complete          spectral and color flow Doppler.  Ordering Location: [facility]  Admission Status:  Inpatient  Study Information: Image quality for this study is technically difficult.    _______________________________________________________________________________________     CONCLUSIONS:      1. Technically difficult image quality.   2. Left ventricular systolic function is mildly decreased with an ejection fraction visually estimated at 45 to 50 %. Global left ventricular hypokinesis.   3. Based on visualassessment, the right ventricle appears mildly enlarged. borderline reduced systolic function.   4. Device lead is visualized in the right heart.   5. The left atrium is moderately dilated.   6. The right atrium is dilated in size.   7. Symmetric mitral valve leaflet tethering.   8. Mild mitral regurgitation.   9. Trileaflet aortic valve with reduced systolic excursion. There is calcification of the aortic valve leaflets. moderate aortic stenosis.  10. The DEVYN is estimated at 1.1sqcm.  11. Moderate to severe tricuspid regurgitation.  12. Estimated pulmonary artery systolic pressure is 78 mmHg, consistent with severe pulmonary hypertension.  13. The inferior vena cava is dilated measuring 2.67 cm in diameter, (dilated >2.1cm) with abnormal inspiratory collapse (abnormal <50%) consistent with elevated right atrial pressure (~15, range 10-20mmHg).    ________________________________________________________________________________________  FINDINGS:     Left Ventricle:  Left ventricular systolic function is mildly decreased with an ejection fraction visually estimated at 45 to 50%. There is global left ventricular hypokinesis.     Right Ventricle:  Based on visual assessment, the right ventricle appears mildly enlarged. Borderline reducedsystolic function. Tricuspid annular plane systolic excursion (TAPSE) is 2.3 cm (normal >=1.7 cm). A device lead is visualized in the right heart.     Left Atrium:  The left atrium is moderately dilated with an indexed volume of 45.99 ml/m².     Right Atrium:  The right atrium is dilated in size.     Aortic Valve:  The aortic valve appears trileaflet with reduced systolic excursion. There is calcification of the aortic valve leaflets. There is moderate aortic stenosis. The peak transaortic velocity is 2.61 m/s, peak transaortic gradient is 27.2 mmHg and mean transaortic gradient is 13.0 mmHg with an LVOT/aortic valve VTI ratio of 0.31. The aortic valve area is estimated at 1.09 cm² by the continuity equation. The DEVYN is estimated at 1.1sqcm. There is trace aortic regurgitation.     Mitral Valve:  There is calcification of the mitral valve annulus. Mitral valve leaflets are diffusely calcified. There is symmetric leaflet tethering. There is mild mitral regurgitation.     Tricuspid Valve:  Structurally normal tricuspid valve with normal leaflet excursion. There is moderate to severe tricuspid regurgitation. Estimated pulmonary artery systolic pressure is 78 mmHg, consistent with severe pulmonary hypertension.     Pulmonic Valve:  The pulmonic valve was not well visualized. There is mild pulmonic regurgitation.     Aorta:  The aortic root at the sinuses of Valsalva is normal in size, measuring 3.00 cm (indexed 1.39 cm/m²). The ascending aorta diameter is normal in size, measuring 2.60 cm (indexed 1.21 cm/m²).     Systemic Veins:  The inferior vena cava is dilated measuring 2.67 cm in diameter, (dilated >2.1cm) with abnormal inspiratory collapse (abnormal <50%) consistent with elevated right atrial pressure (~15, range 10-20mmHg).  ____________________________________________________________________  QUANTITATIVE DATA:  Left Ventricle Measurements: (Indexed to BSA)     IVSd (2D):   1.4 cm  LVPWd (2D):  1.3 cm  LVIDd (2D):  5.4 cm  LVIDs (2D):  4.5 cm  LV Mass:     314 g  145.8g/m²  Visualized LV EF%: 45 to 50%     MV E Vmax:    1.71 m/s  MV A Vmax:    0.75 m/s  MV E/A:       2.27  e' lateral:   6.42 cm/s  e' medial:    5.87 cm/s  E/e' lateral: 26.64  E/e' medial:  29.13  E/e' Average: 27.83  MV DT:        161 msec    Aorta Measurements: (normal range) (Indexed to BSA)     Sinuses of Valsalva: 3.00 cm (2.7 - 3.3 cm)  Ao Asc prox:         2.60 cm       Left Atrium Measurements: (Indexed to BSA)  LA Diam 2D: 5.00 cm    Right Ventricle Measurements:     TAPSE:            2.3 cm  RV Base (RVID1):  4.3 cm  RV Mid (RVID2):   3.2 cm  RV Major (RVID3): 7.5 cm       LVOT / RVOT/ Qp/Qs Data: (Indexed to BSA)  LVOT Diameter: 2.10 cm  LVOT Vmax:     1.02 m/s  LVOT VTI:      17.80 cm  LVOT SV:       61.7 ml  28.61 ml/m²    Aortic Valve Measurements:  AV Vmax:                2.6 m/s  AV Peak Gradient:       27.2 mmHg  AV Mean Gradient:       13.0 mmHg  AV VTI:                 56.6 cm  AV VTI Ratio:           0.31  AoV EOA, Contin:        1.09 cm²  AoV EOA, Contin i:      0.51 cm²/m²  AoV Dimensionless Index 0.31    Mitral Valve Measurements:     MV E Vmax: 1.7 m/s         MR Vmax:          5.05 m/s  MV A Vmax: 0.8 m/s         MR Peak Gradient: 102.0 mmHg  MV E/A:    2.3       Tricuspid Valve Measurements:     TR Vmax:      4.0 m/s  TR Peak Gradient: 62.7 mmHg  RA Pressure:      15 mmHg  PASP:             78 mmHg    ________________________________________________________________________________________  Electronically signed on 1/22/2024 at 5:39:34 PM by Serafin Cooper         *** Final ***      [facility] -- Josue Moore, Kenneth Bee Savella, , Noelle Fry  Office # [phone]    Follow Up:  Afib/flutter s/p PPM, NICMY, SOB, Edema, Bifascicular block    Subjective/Observations: Tolerating RA.  States, she feels much better.  Eager to go home.  Denies any form of respiratory or cardiac discomfort.  Apacing on tele    REVIEW OF SYSTEMS: All other review of systems is negative unless indicated above  PAST MEDICAL & SURGICAL HISTORY:  Hypothyroid      DM (diabetes mellitus)      HTN (hypertension)      Atrial fibrillation and flutter  s/p ablation      Bradycardia  s/p MDT PPM      H/O pulmonary hypertension      H/O hyperparathyroidism      H/O cardiomyopathy      Cardiac pacemaker      H/O cardiac radiofrequency ablation      H/O carpal tunnel repair      History of parathyroid surgery  MEDICATIONS  (STANDING):  artificial tears (preservative free) Ophthalmic Solution 1 Drop(s) Both EYES every 6 hours  carvedilol 12.5 milliGRAM(s) Oral every 12 hours  clotrimazole/betamethasone Cream 1 Application(s) Topical daily  dextrose 10% Bolus 125 milliLiter(s) IV Bolus once  dextrose 5%. 1000 milliLiter(s) (50 mL/Hr) IV Continuous <Continuous>  dextrose 5%. 1000 milliLiter(s) (100 mL/Hr) IV Continuous <Continuous>  dextrose 50% Injectable 25 Gram(s) IV Push once  dextrose 50% Injectable 12.5 Gram(s) IV Push once  digoxin     Tablet 250 MICROGram(s) Oral daily  glucagon  Injectable 1 milliGRAM(s) IntraMuscular once  insulin glargine Injectable (LANTUS) 30 Unit(s) SubCutaneous at bedtime  insulin lispro (ADMELOG) corrective regimen sliding scale   SubCutaneous Before meals and at bedtime  insulin lispro Injectable (ADMELOG) 10 Unit(s) SubCutaneous three times a day before meals  levothyroxine 125 MICROGram(s) Oral daily  lidocaine   4% Patch 1 Patch Transdermal daily  loratadine 10 milliGRAM(s) Oral daily  rivaroxaban 20 milliGRAM(s) Oral with dinner    MEDICATIONS  (PRN):  acetaminophen     Tablet .. 650 milliGRAM(s) Oral every 6 hours PRN Temp greater or equal to 38C (100.4F), Mild Pain (1 - 3)  ALPRAZolam 0.25 milliGRAM(s) Oral daily PRN anxiety  aluminum hydroxide/magnesium hydroxide/simethicone Suspension 30 milliLiter(s) Oral every 4 hours PRN Dyspepsia  dextrose Oral Gel 15 Gram(s) Oral once PRN Blood Glucose LESS THAN 70 milliGRAM(s)/deciliter  lidocaine 4% Cream 1 Application(s) Topical two times a day PRN pain in feet  melatonin 3 milliGRAM(s) Oral at bedtime PRN Insomnia  ondansetron Injectable 4 milliGRAM(s) IV Push every 8 hours PRN Nausea and/or Vomiting  zolpidem 5 milliGRAM(s) Oral at bedtime PRN Insomnia    Allergies    penicillin (Short breath; Rash)  statins (Vomiting)  erythromycin (Rash)  Synjardy (Other (Severe))  nystatin topical (Hives; Rash)  vancomycin (Short breath; Rash)    Intolerances    Vital Signs Last 24 Hrs  T(C): 36.5 (02 May 2024 05:02), Max: 36.8 (01 May 2024 16:59)  T(F): 97.7 (02 May 2024 05:02), Max: 98.3 (01 May 2024 16:59)  HR: 60 (02 May 2024 05:02) (60 - 62)  BP: 165/76 (02 May 2024 05:02) (165/76 - 179/81)  BP(mean): --  RR: 19 (02 May 2024 05:02) (18 - 19)  SpO2: 93% (02 May 2024 05:02) (90% - 93%)    Parameters below as of 02 May 2024 05:02  Patient On (Oxygen Delivery Method): room air    I&O's Summary    01 May 2024 07:01  -  02 May 2024 07:00  --------------------------------------------------------  IN: 200 mL / OUT: 2200 mL / NET: -2000 mL     PHYSICAL EXAM:  TELE: Apacing  Constitutional: NAD, awake and alert  HEENT: Moist Mucous Membranes, Anicteric  Pulmonary: Non-labored, breath sounds are clear bilaterally, No wheezing, rales or rhonchi  Cardiovascular: Regular/IRRR, S1 and S2, No murmurs, rubs, gallops or clicks  Gastrointestinal: Bowel Sounds present, soft, nontender.   Lymph: Trace BLE edema. No lymphadenopathy.  Skin: No visible rashes.  BLE ace wraps in use  Psych:  Mood & affect appropriate  LABS: All Labs Reviewed:                        12.7   3.72  )-----------( 149      ( 02 May 2024 06:10 )             41.3                         12.1   3.73  )-----------( 145      ( 01 May 2024 06:18 )             39.5                         12.4   3.87  )-----------( 145      ( 30 Apr 2024 07:50 )             39.4     02 May 2024 06:10    141    |  101    |  19     ----------------------------<  206    3.8     |  34     |  0.69   01 May 2024 06:18    141    |  99     |  19     ----------------------------<  219    3.6     |  39     |  0.72   30 Apr 2024 07:50    140    |  96     |  19     ----------------------------<  221    3.6     |  40     |  0.82     Ca    8.5        02 May 2024 06:10  Ca    9.0        01 May 2024 06:18  Ca    9.4        30 Apr 2024 07:50    TPro  6.9    /  Alb  2.9    /  TBili  0.9    /  DBili  x      /  AST  23     /  ALT  13     /  AlkPhos  100    02 May 2024 06:10  TPro  6.8    /  Alb  2.9    /  TBili  0.9    /  DBili  x      /  AST  18     /  ALT  12     /  AlkPhos  97     01 May 2024 06:18  TPro  6.9    /  Alb  3.0    /  TBili  0.9    /  DBili  x      /  AST  18     /  ALT  13     /  AlkPhos  101    30 Apr 2024 07:50    12 Lead ECG:   Ventricular Rate 61 BPM    Atrial Rate 61 BPM    P-R Interval 432 ms    QRS Duration 174 ms    Q-T Interval 450 ms    QTC Calculation(Bazett) 453 ms    R Axis -50 degrees    T Axis 126 degrees    Diagnosis Line Sinus rhythm with 1st degree AV block  atrial-paced complexes  Right bundle branch block  Left anterior fascicular block  *** Bifascicular block ***  Left ventricular hypertrophy with repolarization abnormality ( R in aVL )    Confirmed by USHA BEE ([id number]) on 4/26/2024 7:51:43 AM (04-25-24 @ 16:56)      TRANSTHORACIC ECHOCARDIOGRAM REPORT  ________________________________________________________________________________                                      _______       Pt. Name:       ROSITA GUTIERREZ Study Date:    1/22/2024  MRN:            [id number]          YOB: 1950  Accession #:    [id number]         Age:           73 years  Account#:       [id number]        Gender:        F  Heart Rate:                       Height:        ( )  Rhythm:                           Weight:   220.46 lb (100.00 kg)  Blood Pressure: 160/61 mmHg       BSA/BMI:       2.15 m² /  ________________________________________________________________________________________  Referring Physician:    [id number] Lexx Cuenca  Interpreting Physician: Serafin Cooper  Primary Sonographer:    Bernabe Heredia    CPT:               ECHO TTE WO CON COMP W DOPP - 35800.m  Indication(s):     Dyspnea, unspecified - R06.00  Procedure:         Transthoracic echocardiogram with 2-D, M-mode and complete          spectral and color flow Doppler.  Ordering Location: [facility]  Admission Status:  Inpatient  Study Information: Image quality for this study is technically difficult.    _______________________________________________________________________________________     CONCLUSIONS:      1. Technically difficult image quality.   2. Left ventricular systolic function is mildly decreased with an ejection fraction visually estimated at 45 to 50 %. Global left ventricular hypokinesis.   3. Based on visualassessment, the right ventricle appears mildly enlarged. borderline reduced systolic function.   4. Device lead is visualized in the right heart.   5. The left atrium is moderately dilated.   6. The right atrium is dilated in size.   7. Symmetric mitral valve leaflet tethering.   8. Mild mitral regurgitation.   9. Trileaflet aortic valve with reduced systolic excursion. There is calcification of the aortic valve leaflets. moderate aortic stenosis.  10. The DEVYN is estimated at 1.1sqcm.  11. Moderate to severe tricuspid regurgitation.  12. Estimated pulmonary artery systolic pressure is 78 mmHg, consistent with severe pulmonary hypertension.  13. The inferior vena cava is dilated measuring 2.67 cm in diameter, (dilated >2.1cm) with abnormal inspiratory collapse (abnormal <50%) consistent with elevated right atrial pressure (~15, range 10-20mmHg).    ________________________________________________________________________________________  FINDINGS:     Left Ventricle:  Left ventricular systolic function is mildly decreased with an ejection fraction visually estimated at 45 to 50%. There is global left ventricular hypokinesis.     Right Ventricle:  Based on visual assessment, the right ventricle appears mildly enlarged. Borderline reducedsystolic function. Tricuspid annular plane systolic excursion (TAPSE) is 2.3 cm (normal >=1.7 cm). A device lead is visualized in the right heart.     Left Atrium:  The left atrium is moderately dilated with an indexed volume of 45.99 ml/m².     Right Atrium:  The right atrium is dilated in size.     Aortic Valve:  The aortic valve appears trileaflet with reduced systolic excursion. There is calcification of the aortic valve leaflets. There is moderate aortic stenosis. The peak transaortic velocity is 2.61 m/s, peak transaortic gradient is 27.2 mmHg and mean transaortic gradient is 13.0 mmHg with an LVOT/aortic valve VTI ratio of 0.31. The aortic valve area is estimated at 1.09 cm² by the continuity equation. The DEVYN is estimated at 1.1sqcm. There is trace aortic regurgitation.     Mitral Valve:  There is calcification of the mitral valve annulus. Mitral valve leaflets are diffusely calcified. There is symmetric leaflet tethering. There is mild mitral regurgitation.     Tricuspid Valve:  Structurally normal tricuspid valve with normal leaflet excursion. There is moderate to severe tricuspid regurgitation. Estimated pulmonary artery systolic pressure is 78 mmHg, consistent with severe pulmonary hypertension.     Pulmonic Valve:  The pulmonic valve was not well visualized. There is mild pulmonic regurgitation.     Aorta:  The aortic root at the sinuses of Valsalva is normal in size, measuring 3.00 cm (indexed 1.39 cm/m²). The ascending aorta diameter is normal in size, measuring 2.60 cm (indexed 1.21 cm/m²).     Systemic Veins:  The inferior vena cava is dilated measuring 2.67 cm in diameter, (dilated >2.1cm) with abnormal inspiratory collapse (abnormal <50%) consistent with elevated right atrial pressure (~15, range 10-20mmHg).  ____________________________________________________________________  QUANTITATIVE DATA:  Left Ventricle Measurements: (Indexed to BSA)     IVSd (2D):   1.4 cm  LVPWd (2D):  1.3 cm  LVIDd (2D):  5.4 cm  LVIDs (2D):  4.5 cm  LV Mass:     314 g  145.8g/m²  Visualized LV EF%: 45 to 50%     MV E Vmax:    1.71 m/s  MV A Vmax:    0.75 m/s  MV E/A:       2.27  e' lateral:   6.42 cm/s  e' medial:    5.87 cm/s  E/e' lateral: 26.64  E/e' medial:  29.13  E/e' Average: 27.83  MV DT:        161 msec    Aorta Measurements: (normal range) (Indexed to BSA)     Sinuses of Valsalva: 3.00 cm (2.7 - 3.3 cm)  Ao Asc prox:         2.60 cm       Left Atrium Measurements: (Indexed to BSA)  LA Diam 2D: 5.00 cm    Right Ventricle Measurements:     TAPSE:            2.3 cm  RV Base (RVID1):  4.3 cm  RV Mid (RVID2):   3.2 cm  RV Major (RVID3): 7.5 cm       LVOT / RVOT/ Qp/Qs Data: (Indexed to BSA)  LVOT Diameter: 2.10 cm  LVOT Vmax:     1.02 m/s  LVOT VTI:      17.80 cm  LVOT SV:       61.7 ml  28.61 ml/m²    Aortic Valve Measurements:  AV Vmax:                2.6 m/s  AV Peak Gradient:       27.2 mmHg  AV Mean Gradient:       13.0 mmHg  AV VTI:                 56.6 cm  AV VTI Ratio:           0.31  AoV EOA, Contin:        1.09 cm²  AoV EOA, Contin i:      0.51 cm²/m²  AoV Dimensionless Index 0.31    Mitral Valve Measurements:     MV E Vmax: 1.7 m/s         MR Vmax:          5.05 m/s  MV A Vmax: 0.8 m/s         MR Peak Gradient: 102.0 mmHg  MV E/A:    2.3       Tricuspid Valve Measurements:     TR Vmax:      4.0 m/s  TR Peak Gradient: 62.7 mmHg  RA Pressure:      15 mmHg  PASP:             78 mmHg    ________________________________________________________________________________________  Electronically signed on 1/22/2024 at 5:39:34 PM by Serafin Cooper         *** Final ***

## 2024-05-02 NOTE — CHART NOTE - NSCHARTNOTEFT_GEN_A_CORE
Patient seen at bedside   reports feeling better   leg swelling improved.   denies any chest pain  on RA    discussed with cardio and nephrology  stable for discharge with outpatient follow up  continue lasix 40mg daily     Resume home insulin per discussion with endo. Follow up with Dr Bolton in the office.

## 2024-05-02 NOTE — PROGRESS NOTE ADULT - ASSESSMENT
72 y/o Female w/ PMHx of HTN, Type 2 Diabetes, NICM/HFrEF, Afib/flutter s/p ablation 3/8/12, bradycardia/conduction disease s/p MDT dual-chamber PPM 3/8/12, pHTN, Hypothyroidism, Hyperparathyroidism presented to hospital c/o SOB; admitted for management of HFrEF exacerbation.  Outpatient Cards: Dr. Ellis    NICM/HFrEF exacerbation  - Apart from trace edema, she is clinically euvolemic.  No O2 requirement  - TTE 1/2024: LVEF 40-45%, global LV hypokinesis, moderate AS/TR, severe pHTN  - TTE 2/3/24: LVEF 40%, dilated RA/LA, dilated RV/LV, LVH, PPM/ AICD in RA and RV, moderate segmental LV dysfxn w/ overall LVSF mildly reduced, trace AI, mod MR/TR, mild PI, severe pHTN  - Refused repeat ECHO with def  - s/p Lasix IV and Diamox  - Can resume PO Lasix 40 mg daily    - Continue GDMT: Optimize as able.    - Continue Coreg  - BP persistently elevated, increase Losartan to 100 mg daily    - EKG: SR w/ 1st degree AVB, RBBB, LVH; trops negative; no e/o ischemia   - No ischemic eval outpatient per records obtained from Dr. Ellis's office    - Known A fib, s/p ablation 3/8/12  - Rate controlled per flow sheets  - Continue Digoxin 250mcg and Xarelto 20mg qhs  - Digoxin level 4/26: 1.5  - PPM interrogation 2/29/24: MVPR (AAIR > DDR ); normal device fxn, adequate pacing and sensing thresholds; estimated battery life 9.3yrs    - Monitor and replete lytes, keep K>4, Mg>2.  - Will continue to follow.    Amber Miranda DNP, NP-C, AGACNP-C  Cardiology   Call TEAMS        72 y/o Female w/ PMHx of HTN, Type 2 Diabetes, NICM/HFrEF, Afib/flutter s/p ablation 3/8/12, bradycardia/conduction disease s/p MDT dual-chamber PPM 3/8/12, pHTN, Hypothyroidism, Hyperparathyroidism presented to hospital c/o SOB; admitted for management of HFrEF exacerbation.  Outpatient Cards: Dr. Ellis    NICM/HFrEF exacerbation  - Apart from trace edema, she is clinically euvolemic.  No O2 requirement  - TTE 1/2024: LVEF 40-45%, global LV hypokinesis, moderate AS/TR, severe pHTN  - TTE 2/3/24: LVEF 40%, dilated RA/LA, dilated RV/LV, LVH, PPM/ AICD in RA and RV, moderate segmental LV dysfxn w/ overall LVSF mildly reduced, trace AI, mod MR/TR, mild PI, severe pHTN  - Refused repeat ECHO with def  - s/p Lasix IV and Diamox  - Can resume PO Lasix 40 mg daily    - Continue GDMT: Optimize as able.    - Continue Coreg  - BP persistently elevated, resume Losartan    - EKG: SR w/ 1st degree AVB, RBBB, LVH; trops negative; no e/o ischemia   - No ischemic eval outpatient per records obtained from Dr. Ellis's office    - Known A fib, s/p ablation 3/8/12  - Rate controlled per flow sheets  - Continue Digoxin 250mcg and Xarelto 20mg qhs  - Digoxin level 4/26: 1.5  - PPM interrogation 2/29/24: MVPR (AAIR > DDR ); normal device fxn, adequate pacing and sensing thresholds; estimated battery life 9.3yrs    - Monitor and replete lytes, keep K>4, Mg>2.  - Will continue to follow.    Amber Miranda DNP, NP-C, AGACNP-C  Cardiology   Call TEAMS

## 2024-05-02 NOTE — DISCHARGE NOTE NURSING/CASE MANAGEMENT/SOCIAL WORK - NSSCNAMETXT_GEN_ALL_CORE
[facility] [phone] or  [phone]  Nurse to visit the day after hospital discharge; physical therapist to follow. Please contact the home care agency at the above phone number if you have not heard from them by 12 noon on the day after your hospital discharge.

## 2024-05-02 NOTE — PROGRESS NOTE ADULT - NS ATTEND AMEND GEN_ALL_CORE FT
73F PMH HFrEF, atrial fibrillation and atrial flutter s/p ablation, HTN, DM, non ischemic cardiomyopathy with moderate LV dysfunction, pulmonary hypertension, depression, bradycardia and conduction disease s/p Medtronic dual chamber pacemaker implant, edema, hypothyroidism, hyperparathyroidism, presents with shortness of breath, and LE edema. Cardiology called for SOB.    - has known history of afib on xarelto  - continue coreg and digoxin  - on iv lasix, with increase in bicarb. Todays numbers have improved, and favor restarting iv lasix.  - Echo: 1/24  ef 40-45% Global left ventricular hypokinesis. Moderate TR, Severe Pulm HTN, moderate AS  - Repeat TTE ordered, f/u results  - GDMT continue coreg , start Losartan   - Strict I/Os, daily weights.  - will need to obtain records from Dr Carrasco , unclear what ischemic beck pt has had in past , further plan pending review of outpt records   - titrate up losartan as able  - hx dvt on xarelto.
74 y/o Female w/ PMHx of HTN, Type 2 Diabetes, NICM/HFrEF, Afib/flutter s/p ablation 3/8/12, bradycardia/conduction disease s/p MDT dual-chamber PPM 3/8/12, pHTN, Hypothyroidism, Hyperparathyroidism presented to hospital c/o SOB; admitted for management of HFrEF exacerbation.  Outpatient Cards: Dr. Ellis     - Presented w/ SOB, orthopnea, LE edema w/ 40lb weight gain  -known cmy, recent echo no change in ef ~40, refused repeat study  for addl lasix and diamox  contrast allergy, reports remote nuc stress without abns   - Known A fib, s/p ablation 3/8/12  - Rate controlled per flow sheets  - Continue Digoxin 250mcg, Coreg 12.5mg BID and Xarelto 20mg qhs
73F PMH HFrEF, atrial fibrillation and atrial flutter s/p ablation, HTN, DM, non ischemic cardiomyopathy with moderate LV dysfunction, pulmonary hypertension, depression, bradycardia and conduction disease s/p Medtronic dual chamber pacemaker implant, edema, hypothyroidism, hyperparathyroidism, presents with shortness of breath, and LE edema. Cardiology called for SOB. Dr. Ellis: Cardio.     -has known history of afib on xarelto  -continue coreg and digoxin, check dig level   - Continue with IV lasix 40mg BID   - Repeat TTE ordered, f/u results  - GDMT continue coreg , start Losartan   - Strict I/Os, daily weights.  -will need to obtain records from Dr Carrasco , unclear what ischemic beck pt has had in past , further plan pending review of outpt records   -hx dvt on xarelto
72 y/o Female w/ PMHx of HTN, Type 2 Diabetes, NICM/HFrEF, Afib/flutter s/p ablation 3/8/12, bradycardia/conduction disease s/p MDT dual-chamber PPM 3/8/12, pHTN, Hypothyroidism, Hyperparathyroidism presented to hospital c/o SOB; admitted for management of HFrEF exacerbation.  Outpatient Cards: Dr. Ellis        - TTE 2/3/24: LVEF 40%, dilated RA/LA, dilated RV/LV, LVH, PPM/ AICD in RA and RV, moderate segmental LV dysfxn w/ overall LVSF mildly reduced, trace AI, mod MR/TR, mild PI, severe pHTN  - Refused repeat ECHO  - s/p IV Lasix; however w/ increase in Bicarb so 4/28PM and 4/29AM dose held. Bicarb improving w/ hold on diuresis.  - Continue GDMT: Coreg 12.5mg BID, Losartan 75 mg qd  - No ischemic eval outpatient per records obtained from Dr. Ellis's office  - Continue Digoxin 250mcg, Coreg 12.5mg BID and Xarelto 20mg qhs  - Digoxin level 4/26: 1.5  - PPM interrogation 2/29/24: MVPR (AAIR > DDR ); normal device fxn, adequate pacing and sensing thresholds; estimated battery life 9.3yrs  - Refusing ischemia evaluation. To follow with her cardiologist to discuss in further detail.
72 y/o Female w/ PMHx of HTN, Type 2 Diabetes, NICM/HFrEF, Afib/flutter s/p ablation 3/8/12, bradycardia/conduction disease s/p MDT dual-chamber PPM 3/8/12, pHTN, Hypothyroidism, Hyperparathyroidism presented to hospital c/o SOB; admitted for management of HFrEF exacerbation.  Outpatient Cards: Dr. Ellis     - Presented w/ SOB, orthopnea, LE edema w/ 40lb weight gain  -known cmy, recent echo no change in ef ~40  - S/p additional lasix and diamox on 5/1  contrast allergy, reports remote nuc stress without abns   - Known A fib, s/p ablation 3/8/12  - Rate controlled per flow sheets  - Continue Digoxin 250mcg, Coreg 12.5mg BID and Xarelto 20mg qhs.  - Would resume Losartan for BP control and GDMT  - Would place on po diuretic for maintenance therapy
73F PMH HFrEF, atrial fibrillation and atrial flutter s/p ablation, HTN, DM, non ischemic cardiomyopathy with moderate LV dysfunction, pulmonary hypertension, depression, bradycardia and conduction disease s/p Medtronic dual chamber pacemaker implant, edema, hypothyroidism, hyperparathyroidism, presents with shortness of breath, and LE edema. Cardiology called for SOB. Dr. Ellis: Cardio.     -has known history of afib on xarelto  -continue coreg and digoxin, check dig level   - on iv lasix now with increase in bicarb, can hold PM dose and reassess in am , ir worsens can give diamox 250mg po bid x 48 hours   - Echo: 1/24  ef 40-45% Global left ventricular hypokinesis. Moderate TR, Severe Pulm HTN, moderate AS  - Repeat TTE ordered, f/u results  - GDMT continue coreg , start Losartan   - Strict I/Os, daily weights.  -will need to obtain records from Dr Carrasco , unclear what ischemic beck pt has had in past , further plan pending review of outpt records   -bp suboptimal start losartan for GDMT continue coreg   -hx dvt on xarelto

## 2024-05-09 NOTE — REVIEW OF SYSTEMS
Name band; [Negative] : Heme/Lymph [FreeTextEntry2] : over weight [FreeTextEntry6] : pulmonary hypertension [FreeTextEntry5] : A Fib, hypertension,RBBB,CHF,Cardiomyopathy, Aortic valve insufficiency [de-identified] : DM 2 insulin dependent, hypothyroid [FreeTextEntry1] : venous insufficiency

## 2024-05-16 ENCOUNTER — APPOINTMENT (OUTPATIENT)
Dept: FAMILY MEDICINE | Facility: CLINIC | Age: 74
End: 2024-05-16
Payer: MEDICARE

## 2024-05-16 VITALS
HEIGHT: 64 IN | OXYGEN SATURATION: 95 % | RESPIRATION RATE: 18 BRPM | WEIGHT: 204 LBS | BODY MASS INDEX: 34.83 KG/M2 | SYSTOLIC BLOOD PRESSURE: 177 MMHG | TEMPERATURE: 98 F | DIASTOLIC BLOOD PRESSURE: 72 MMHG | HEART RATE: 68 BPM

## 2024-05-16 VITALS — DIASTOLIC BLOOD PRESSURE: 82 MMHG | SYSTOLIC BLOOD PRESSURE: 174 MMHG

## 2024-05-16 DIAGNOSIS — E11.628 TYPE 2 DIABETES MELLITUS WITH OTHER SKIN COMPLICATIONS: ICD-10-CM

## 2024-05-16 DIAGNOSIS — I48.91 UNSPECIFIED ATRIAL FIBRILLATION: ICD-10-CM

## 2024-05-16 DIAGNOSIS — I50.20 UNSPECIFIED SYSTOLIC (CONGESTIVE) HEART FAILURE: ICD-10-CM

## 2024-05-16 DIAGNOSIS — I10 ESSENTIAL (PRIMARY) HYPERTENSION: ICD-10-CM

## 2024-05-16 DIAGNOSIS — R60.0 LOCALIZED EDEMA: ICD-10-CM

## 2024-05-16 DIAGNOSIS — R32 UNSPECIFIED URINARY INCONTINENCE: ICD-10-CM

## 2024-05-16 PROCEDURE — 99214 OFFICE O/P EST MOD 30 MIN: CPT

## 2024-05-16 RX ORDER — FUROSEMIDE 40 MG/1
40 TABLET ORAL
Refills: 0 | Status: DISCONTINUED | COMMUNITY
End: 2024-05-16

## 2024-05-16 RX ORDER — LOSARTAN POTASSIUM 100 MG/1
100 TABLET, FILM COATED ORAL
Refills: 0 | Status: ACTIVE | COMMUNITY

## 2024-05-16 RX ORDER — ACETAMINOPHEN 500 MG
500 TABLET ORAL
Refills: 0 | Status: ACTIVE | COMMUNITY

## 2024-05-16 RX ORDER — LEVOTHYROXINE SODIUM 125 UG/1
125 TABLET ORAL DAILY
Refills: 0 | Status: DISCONTINUED | COMMUNITY
Start: 2022-06-17 | End: 2024-05-16

## 2024-05-16 RX ORDER — ACETAMINOPHEN 325 MG/1
325 TABLET, FILM COATED ORAL
Refills: 0 | Status: DISCONTINUED | COMMUNITY
End: 2024-05-16

## 2024-05-16 RX ORDER — LEVOTHYROXINE SODIUM 0.12 MG/1
125 TABLET ORAL
Refills: 0 | Status: ACTIVE | COMMUNITY

## 2024-05-16 RX ORDER — LOSARTAN POTASSIUM 50 MG/1
50 TABLET, FILM COATED ORAL
Qty: 135 | Refills: 0 | Status: DISCONTINUED | COMMUNITY
Start: 2024-03-07 | End: 2024-05-16

## 2024-05-16 NOTE — PHYSICAL EXAM
[No Respiratory Distress] : no respiratory distress  [No Accessory Muscle Use] : no accessory muscle use [Clear to Auscultation] : lungs were clear to auscultation bilaterally [Normal] : normal rate, regular rhythm, normal S1 and S2 and no murmur heard [Coordination Grossly Intact] : coordination grossly intact [No Focal Deficits] : no focal deficits [de-identified] : +poor dentition  [de-identified] : +decreased breath sounds bilaterally.  [de-identified] : + 1 non-pitting edema of thighs. +ACE bandage wraps on lower legs c/d/i

## 2024-05-16 NOTE — REVIEW OF SYSTEMS
[Recent Change In Weight] : ~T recent weight change [Dyspnea on Exertion] : dyspnea on exertion [Muscle Pain] : muscle pain [Negative] : Psychiatric [Fever] : no fever [Chills] : no chills [Fatigue] : no fatigue [Shortness Of Breath] : no shortness of breath [Wheezing] : no wheezing [Cough] : no cough [Abdominal Pain] : no abdominal pain [Nausea] : no nausea [Diarrhea] : diarrhea [Vomiting] : no vomiting [Dysuria] : no dysuria [Nocturia] : no nocturia [Hematuria] : no hematuria [Frequency] : no frequency [Joint Pain] : no joint pain [Back Pain] : no back pain [Headache] : no headache [Dizziness] : no dizziness [FreeTextEntry4] : +loss of teeth [FreeTextEntry7] : +dysphagia [FreeTextEntry8] : +urinary incontinence  [de-identified] : +peripheral neuropathy

## 2024-05-16 NOTE — ASSESSMENT
[FreeTextEntry1] : Heart failure - recent admission at [facility] - has been doing better since then - now weighing herself daily - advised if iarked increase in weight to take additional 20mg of lasix that day to avoid extreme fluid build up  - has cardio appt next week - aware to reduce salt in diet but has difficulty eating due to poor dentition and difficulty getting her food  HTN - Elevated initially on presentation. Repeat BP similar  - Losartan increased from 75mg to 100mg upon discharge from the hospital - Maintain current regimen until patient has f/u appointment with Cardiologist on 5/24 w/ Dr. Ellis   T2DM - Home BGs usually 130s-140s.  - Recently saw new Endocrinologist Dr. Aguila - Continue current management per Endo - Cont Tylenol for peripheral neuropathy as other neuropathic meds ineffective  Urinary incontinence  - Occurring for >6mo - No concerning lower extremity neuropathy - F/u with PT for pelvic floor exercises. If ineffective, refer to Urogynecology.

## 2024-05-16 NOTE — HISTORY OF PRESENT ILLNESS
[FreeTextEntry2] : 73 year old female with a past medical history of atrial fibrillation on Xarelto, bradycardia status post pacemaker, hypertension, diabetes, cardiomyopathy, pulmonary hypertension, HFpEF (40-45%, 1/2024) was admitted to [facility] from 4/25 - 5/2 for acute on chronic CHF exacerbation. She was treated with IV diuretics and discharged on home PO lasix. Losartan was increased from 75mg to 100mg daily.  Patient reports since being home she had to "re-learn to walk" with the assistance of PT, and that her mobility and SALCIDO have slowly improved. She will start outpatient PT soon. Additionally, reports dysphagia and upset stomach 2/2 poor dentition. She has a  working on this. Lastly, reports occasional urinary incontinence since the Fall that she has not seen any providers for.

## 2024-05-30 DIAGNOSIS — R26.81 UNSTEADINESS ON FEET: ICD-10-CM

## 2024-07-19 ENCOUNTER — NON-APPOINTMENT (OUTPATIENT)
Age: 74
End: 2024-07-19

## 2024-08-13 ENCOUNTER — NON-APPOINTMENT (OUTPATIENT)
Age: 74
End: 2024-08-13

## 2024-08-13 DIAGNOSIS — F41.9 ANXIETY DISORDER, UNSPECIFIED: ICD-10-CM

## 2024-10-10 ENCOUNTER — APPOINTMENT (OUTPATIENT)
Dept: WOUND CARE | Facility: HOSPITAL | Age: 74
End: 2024-10-10

## 2024-10-23 ENCOUNTER — APPOINTMENT (OUTPATIENT)
Dept: FAMILY MEDICINE | Facility: CLINIC | Age: 74
End: 2024-10-23

## 2024-11-04 ENCOUNTER — APPOINTMENT (OUTPATIENT)
Dept: FAMILY MEDICINE | Facility: CLINIC | Age: 74
End: 2024-11-04
Payer: MEDICARE

## 2024-11-04 VITALS
WEIGHT: 197.5 LBS | DIASTOLIC BLOOD PRESSURE: 74 MMHG | OXYGEN SATURATION: 98 % | SYSTOLIC BLOOD PRESSURE: 144 MMHG | TEMPERATURE: 98.1 F | HEIGHT: 64.5 IN | BODY MASS INDEX: 33.31 KG/M2 | HEART RATE: 64 BPM

## 2024-11-04 DIAGNOSIS — R13.10 DYSPHAGIA, UNSPECIFIED: ICD-10-CM

## 2024-11-04 DIAGNOSIS — E11.628 TYPE 2 DIABETES MELLITUS WITH OTHER SKIN COMPLICATIONS: ICD-10-CM

## 2024-11-04 DIAGNOSIS — Z09 ENCOUNTER FOR FOLLOW-UP EXAMINATION AFTER COMPLETED TREATMENT FOR CONDITIONS OTHER THAN MALIGNANT NEOPLASM: ICD-10-CM

## 2024-11-04 DIAGNOSIS — R60.0 LOCALIZED EDEMA: ICD-10-CM

## 2024-11-04 DIAGNOSIS — F41.9 ANXIETY DISORDER, UNSPECIFIED: ICD-10-CM

## 2024-11-04 PROCEDURE — 99214 OFFICE O/P EST MOD 30 MIN: CPT

## 2024-11-27 ENCOUNTER — APPOINTMENT (OUTPATIENT)
Dept: GASTROENTEROLOGY | Facility: CLINIC | Age: 74
End: 2024-11-27

## 2024-12-06 DIAGNOSIS — E03.9 HYPOTHYROIDISM, UNSPECIFIED: ICD-10-CM

## 2024-12-09 ENCOUNTER — RX RENEWAL (OUTPATIENT)
Age: 74
End: 2024-12-09

## 2024-12-17 ENCOUNTER — APPOINTMENT (OUTPATIENT)
Dept: CARDIOLOGY | Facility: CLINIC | Age: 74
End: 2024-12-17
Payer: MEDICARE

## 2024-12-17 DIAGNOSIS — I87.2 VENOUS INSUFFICIENCY (CHRONIC) (PERIPHERAL): ICD-10-CM

## 2024-12-17 DIAGNOSIS — R60.0 LOCALIZED EDEMA: ICD-10-CM

## 2024-12-17 DIAGNOSIS — I27.0 PRIMARY PULMONARY HYPERTENSION: ICD-10-CM

## 2024-12-17 PROCEDURE — 99214 OFFICE O/P EST MOD 30 MIN: CPT

## 2025-01-21 ENCOUNTER — APPOINTMENT (OUTPATIENT)
Dept: GASTROENTEROLOGY | Facility: CLINIC | Age: 75
End: 2025-01-21

## 2025-02-03 ENCOUNTER — RX RENEWAL (OUTPATIENT)
Age: 75
End: 2025-02-03

## 2025-02-18 NOTE — PATIENT PROFILE ADULT - LIFE CHALLENGES - DETAILS
pt states she has had many deaths in the family and sometimes lacks the help she needs at home
Include Z78.9 (Other Specified Conditions Influencing Health Status) As An Associated Diagnosis?: No
Show Aperture Variable?: Yes
Medical Necessity Clause: This procedure was medically necessary because the lesions that were treated were:
Number Of Freeze-Thaw Cycles: 3 freeze-thaw cycles
Medical Necessity Information: It is in your best interest to select a reason for this procedure from the list below. All of these items fulfill various CMS LCD requirements except the new and changing color options.
Application Tool (Optional): Liquid Nitrogen Sprayer
Consent: The patient's consent was obtained including but not limited to risks of crusting, scabbing, blistering, scarring, darker or lighter pigmentary change, recurrence, incomplete removal and infection.
Post-Care Instructions: I reviewed with the patient in detail post-care instructions. Patient is to wear sunprotection, and avoid picking at any of the treated lesions. Pt may apply Vaseline to crusted or scabbing areas.
Spray Paint Text: The liquid nitrogen was applied to the skin utilizing a spray paint frosting technique.
Duration Of Freeze Thaw-Cycle (Seconds): 5-10
Detail Level: Detailed
Aperture Size (Optional): C

## 2025-03-13 ENCOUNTER — RX RENEWAL (OUTPATIENT)
Age: 75
End: 2025-03-13

## 2025-04-28 ENCOUNTER — APPOINTMENT (OUTPATIENT)
Dept: FAMILY MEDICINE | Facility: CLINIC | Age: 75
End: 2025-04-28
